# Patient Record
Sex: FEMALE | Race: BLACK OR AFRICAN AMERICAN | NOT HISPANIC OR LATINO | Employment: OTHER | URBAN - METROPOLITAN AREA
[De-identification: names, ages, dates, MRNs, and addresses within clinical notes are randomized per-mention and may not be internally consistent; named-entity substitution may affect disease eponyms.]

---

## 2018-07-07 ENCOUNTER — HOSPITAL ENCOUNTER (INPATIENT)
Facility: HOSPITAL | Age: 58
LOS: 10 days | Discharge: HOME HEALTH | DRG: 871 | End: 2018-07-17
Attending: EMERGENCY MEDICINE | Admitting: INTERNAL MEDICINE
Payer: MEDICARE

## 2018-07-07 DIAGNOSIS — K57.32 DIVERTICULITIS LARGE INTESTINE W/O PERFORATION OR ABSCESS W/O BLEEDING: ICD-10-CM

## 2018-07-07 DIAGNOSIS — Z72.0 TOBACCO ABUSE: ICD-10-CM

## 2018-07-07 DIAGNOSIS — R10.9 ABDOMINAL PAIN: ICD-10-CM

## 2018-07-07 DIAGNOSIS — K57.20 DIVERTICULITIS OF LARGE INTESTINE WITH ABSCESS WITHOUT BLEEDING: Primary | ICD-10-CM

## 2018-07-07 DIAGNOSIS — A41.9 SEPSIS, DUE TO UNSPECIFIED ORGANISM: ICD-10-CM

## 2018-07-07 DIAGNOSIS — B20 HIV (HUMAN IMMUNODEFICIENCY VIRUS INFECTION): ICD-10-CM

## 2018-07-07 DIAGNOSIS — E87.20 LACTIC ACIDOSIS: ICD-10-CM

## 2018-07-07 PROBLEM — K59.01 SLOW TRANSIT CONSTIPATION: Status: ACTIVE | Noted: 2018-07-07

## 2018-07-07 PROBLEM — Z21 HIV (HUMAN IMMUNODEFICIENCY VIRUS INFECTION): Status: ACTIVE | Noted: 2018-07-07

## 2018-07-07 LAB
ALBUMIN SERPL BCP-MCNC: 4.1 G/DL
ALP SERPL-CCNC: 127 U/L
ALT SERPL W/O P-5'-P-CCNC: 16 U/L
ANION GAP SERPL CALC-SCNC: 14 MMOL/L
APTT BLDCRRT: 23.3 SEC
AST SERPL-CCNC: 22 U/L
BACTERIA #/AREA URNS HPF: NORMAL /HPF
BASOPHILS # BLD AUTO: 0 K/UL
BASOPHILS NFR BLD: 0 %
BILIRUB SERPL-MCNC: 1.7 MG/DL
BILIRUB UR QL STRIP: ABNORMAL
BUN SERPL-MCNC: 11 MG/DL
CALCIUM SERPL-MCNC: 10.4 MG/DL
CHLORIDE SERPL-SCNC: 103 MMOL/L
CLARITY UR: CLEAR
CO2 SERPL-SCNC: 19 MMOL/L
COLOR UR: YELLOW
CREAT SERPL-MCNC: 0.9 MG/DL
DIFFERENTIAL METHOD: ABNORMAL
EOSINOPHIL # BLD AUTO: 0 K/UL
EOSINOPHIL NFR BLD: 0 %
ERYTHROCYTE [DISTWIDTH] IN BLOOD BY AUTOMATED COUNT: 12.7 %
EST. GFR  (AFRICAN AMERICAN): >60 ML/MIN/1.73 M^2
EST. GFR  (NON AFRICAN AMERICAN): >60 ML/MIN/1.73 M^2
GLUCOSE SERPL-MCNC: 148 MG/DL
GLUCOSE UR QL STRIP: NEGATIVE
HCT VFR BLD AUTO: 39.6 %
HGB BLD-MCNC: 13.1 G/DL
HGB UR QL STRIP: ABNORMAL
INR PPP: 1.1
KETONES UR QL STRIP: NEGATIVE
LACTATE SERPL-SCNC: 1.7 MMOL/L
LACTATE SERPL-SCNC: 2.5 MMOL/L
LEUKOCYTE ESTERASE UR QL STRIP: NEGATIVE
LIPASE SERPL-CCNC: 7 U/L
LYMPHOCYTES # BLD AUTO: 1 K/UL
LYMPHOCYTES NFR BLD: 34.6 %
MAGNESIUM SERPL-MCNC: 2 MG/DL
MCH RBC QN AUTO: 31.9 PG
MCHC RBC AUTO-ENTMCNC: 33.1 G/DL
MCV RBC AUTO: 96 FL
MICROSCOPIC COMMENT: NORMAL
MONOCYTES # BLD AUTO: 0.2 K/UL
MONOCYTES NFR BLD: 5.8 %
NEUTROPHILS # BLD AUTO: 1.8 K/UL
NEUTROPHILS NFR BLD: 59.6 %
NITRITE UR QL STRIP: NEGATIVE
PH UR STRIP: 6 [PH] (ref 5–8)
PLATELET # BLD AUTO: 193 K/UL
PMV BLD AUTO: 10.2 FL
POCT GLUCOSE: 115 MG/DL (ref 70–110)
POCT GLUCOSE: 120 MG/DL (ref 70–110)
POTASSIUM SERPL-SCNC: 4 MMOL/L
PROCALCITONIN SERPL IA-MCNC: 0.14 NG/ML
PROT SERPL-MCNC: 8.4 G/DL
PROT UR QL STRIP: ABNORMAL
PROTHROMBIN TIME: 11.2 SEC
RBC # BLD AUTO: 4.11 M/UL
RBC #/AREA URNS HPF: 4 /HPF (ref 0–4)
SODIUM SERPL-SCNC: 136 MMOL/L
SP GR UR STRIP: 1.02 (ref 1–1.03)
SQUAMOUS #/AREA URNS HPF: 5 /HPF
TROPONIN I SERPL DL<=0.01 NG/ML-MCNC: 0.01 NG/ML
URN SPEC COLLECT METH UR: ABNORMAL
UROBILINOGEN UR STRIP-ACNC: 1 EU/DL
WBC # BLD AUTO: 2.95 K/UL
WBC #/AREA URNS HPF: 2 /HPF (ref 0–5)

## 2018-07-07 PROCEDURE — 80053 COMPREHEN METABOLIC PANEL: CPT

## 2018-07-07 PROCEDURE — 63600175 PHARM REV CODE 636 W HCPCS: Performed by: EMERGENCY MEDICINE

## 2018-07-07 PROCEDURE — 93005 ELECTROCARDIOGRAM TRACING: CPT

## 2018-07-07 PROCEDURE — 96375 TX/PRO/DX INJ NEW DRUG ADDON: CPT

## 2018-07-07 PROCEDURE — 63600175 PHARM REV CODE 636 W HCPCS: Performed by: NURSE PRACTITIONER

## 2018-07-07 PROCEDURE — 84484 ASSAY OF TROPONIN QUANT: CPT

## 2018-07-07 PROCEDURE — 84145 PROCALCITONIN (PCT): CPT

## 2018-07-07 PROCEDURE — 83605 ASSAY OF LACTIC ACID: CPT

## 2018-07-07 PROCEDURE — 36415 COLL VENOUS BLD VENIPUNCTURE: CPT

## 2018-07-07 PROCEDURE — 96372 THER/PROPH/DIAG INJ SC/IM: CPT | Mod: 59

## 2018-07-07 PROCEDURE — 25000003 PHARM REV CODE 250: Performed by: EMERGENCY MEDICINE

## 2018-07-07 PROCEDURE — 25000003 PHARM REV CODE 250: Performed by: NURSE PRACTITIONER

## 2018-07-07 PROCEDURE — 96361 HYDRATE IV INFUSION ADD-ON: CPT

## 2018-07-07 PROCEDURE — 85610 PROTHROMBIN TIME: CPT

## 2018-07-07 PROCEDURE — 85025 COMPLETE CBC W/AUTO DIFF WBC: CPT

## 2018-07-07 PROCEDURE — S0030 INJECTION, METRONIDAZOLE: HCPCS | Performed by: EMERGENCY MEDICINE

## 2018-07-07 PROCEDURE — 25500020 PHARM REV CODE 255: Performed by: EMERGENCY MEDICINE

## 2018-07-07 PROCEDURE — 96365 THER/PROPH/DIAG IV INF INIT: CPT

## 2018-07-07 PROCEDURE — 99285 EMERGENCY DEPT VISIT HI MDM: CPT | Mod: 25

## 2018-07-07 PROCEDURE — 11000001 HC ACUTE MED/SURG PRIVATE ROOM

## 2018-07-07 PROCEDURE — 83735 ASSAY OF MAGNESIUM: CPT

## 2018-07-07 PROCEDURE — 93010 ELECTROCARDIOGRAM REPORT: CPT | Mod: ,,, | Performed by: INTERNAL MEDICINE

## 2018-07-07 PROCEDURE — 87040 BLOOD CULTURE FOR BACTERIA: CPT | Mod: 59

## 2018-07-07 PROCEDURE — 83605 ASSAY OF LACTIC ACID: CPT | Mod: 91

## 2018-07-07 PROCEDURE — 81000 URINALYSIS NONAUTO W/SCOPE: CPT

## 2018-07-07 PROCEDURE — 83690 ASSAY OF LIPASE: CPT

## 2018-07-07 PROCEDURE — 85730 THROMBOPLASTIN TIME PARTIAL: CPT

## 2018-07-07 RX ORDER — IBUPROFEN 200 MG
24 TABLET ORAL
Status: DISCONTINUED | OUTPATIENT
Start: 2018-07-07 | End: 2018-07-17 | Stop reason: HOSPADM

## 2018-07-07 RX ORDER — FERROUS GLUCONATE 324(38)MG
324 TABLET ORAL DAILY
COMMUNITY
Start: 2018-05-21

## 2018-07-07 RX ORDER — ACETAMINOPHEN 10 MG/ML
1000 INJECTION, SOLUTION INTRAVENOUS EVERY 8 HOURS
Status: COMPLETED | OUTPATIENT
Start: 2018-07-07 | End: 2018-07-08

## 2018-07-07 RX ORDER — CYPROHEPTADINE HYDROCHLORIDE 4 MG/1
4 TABLET ORAL 3 TIMES DAILY
Status: DISCONTINUED | OUTPATIENT
Start: 2018-07-07 | End: 2018-07-17 | Stop reason: HOSPADM

## 2018-07-07 RX ORDER — AMOXICILLIN 250 MG
2 CAPSULE ORAL NIGHTLY
Status: DISCONTINUED | OUTPATIENT
Start: 2018-07-07 | End: 2018-07-07

## 2018-07-07 RX ORDER — ROSUVASTATIN CALCIUM 20 MG/1
20 TABLET, COATED ORAL DAILY
COMMUNITY
Start: 2018-05-21 | End: 2022-04-14

## 2018-07-07 RX ORDER — ENALAPRIL MALEATE 20 MG/1
20 TABLET ORAL
COMMUNITY
Start: 2018-05-21 | End: 2019-05-21

## 2018-07-07 RX ORDER — POLYETHYLENE GLYCOL 3350 17 G/17G
17 POWDER, FOR SOLUTION ORAL DAILY
Status: DISCONTINUED | OUTPATIENT
Start: 2018-07-07 | End: 2018-07-11

## 2018-07-07 RX ORDER — SODIUM CHLORIDE 0.9 % (FLUSH) 0.9 %
5 SYRINGE (ML) INJECTION
Status: DISCONTINUED | OUTPATIENT
Start: 2018-07-07 | End: 2018-07-17 | Stop reason: HOSPADM

## 2018-07-07 RX ORDER — ONDANSETRON 2 MG/ML
4 INJECTION INTRAMUSCULAR; INTRAVENOUS EVERY 8 HOURS PRN
Status: DISCONTINUED | OUTPATIENT
Start: 2018-07-07 | End: 2018-07-17 | Stop reason: HOSPADM

## 2018-07-07 RX ORDER — DICYCLOMINE HYDROCHLORIDE 10 MG/ML
20 INJECTION INTRAMUSCULAR
Status: COMPLETED | OUTPATIENT
Start: 2018-07-07 | End: 2018-07-07

## 2018-07-07 RX ORDER — ONDANSETRON 2 MG/ML
4 INJECTION INTRAMUSCULAR; INTRAVENOUS
Status: COMPLETED | OUTPATIENT
Start: 2018-07-07 | End: 2018-07-07

## 2018-07-07 RX ORDER — ROSUVASTATIN CALCIUM 10 MG/1
20 TABLET, COATED ORAL DAILY
Status: DISCONTINUED | OUTPATIENT
Start: 2018-07-07 | End: 2018-07-17 | Stop reason: HOSPADM

## 2018-07-07 RX ORDER — CYPROHEPTADINE HYDROCHLORIDE 4 MG/1
4 TABLET ORAL 3 TIMES DAILY
COMMUNITY
Start: 2018-05-21

## 2018-07-07 RX ORDER — ACETAMINOPHEN 325 MG/1
650 TABLET ORAL EVERY 4 HOURS PRN
Status: DISCONTINUED | OUTPATIENT
Start: 2018-07-07 | End: 2018-07-12

## 2018-07-07 RX ORDER — GLUCAGON 1 MG
1 KIT INJECTION
Status: DISCONTINUED | OUTPATIENT
Start: 2018-07-07 | End: 2018-07-17 | Stop reason: HOSPADM

## 2018-07-07 RX ORDER — AMOXICILLIN 250 MG
2 CAPSULE ORAL NIGHTLY
Status: DISCONTINUED | OUTPATIENT
Start: 2018-07-07 | End: 2018-07-17 | Stop reason: HOSPADM

## 2018-07-07 RX ORDER — IBUPROFEN 400 MG/1
400 TABLET ORAL EVERY 4 HOURS PRN
Status: DISCONTINUED | OUTPATIENT
Start: 2018-07-07 | End: 2018-07-17 | Stop reason: HOSPADM

## 2018-07-07 RX ORDER — FERROUS GLUCONATE 324(38)MG
324 TABLET ORAL DAILY
Status: DISCONTINUED | OUTPATIENT
Start: 2018-07-07 | End: 2018-07-17 | Stop reason: HOSPADM

## 2018-07-07 RX ORDER — BUPROPION HYDROCHLORIDE 150 MG/1
150 TABLET, EXTENDED RELEASE ORAL 2 TIMES DAILY
Status: DISCONTINUED | OUTPATIENT
Start: 2018-07-07 | End: 2018-07-17 | Stop reason: HOSPADM

## 2018-07-07 RX ORDER — MORPHINE SULFATE 2 MG/ML
2 INJECTION, SOLUTION INTRAMUSCULAR; INTRAVENOUS ONCE
Status: COMPLETED | OUTPATIENT
Start: 2018-07-07 | End: 2018-07-07

## 2018-07-07 RX ORDER — METRONIDAZOLE 500 MG/100ML
500 INJECTION, SOLUTION INTRAVENOUS
Status: DISCONTINUED | OUTPATIENT
Start: 2018-07-07 | End: 2018-07-11

## 2018-07-07 RX ORDER — CIPROFLOXACIN 2 MG/ML
400 INJECTION, SOLUTION INTRAVENOUS
Status: DISCONTINUED | OUTPATIENT
Start: 2018-07-07 | End: 2018-07-11

## 2018-07-07 RX ORDER — ACETAMINOPHEN 325 MG/1
650 TABLET ORAL EVERY 6 HOURS PRN
Status: DISCONTINUED | OUTPATIENT
Start: 2018-07-07 | End: 2018-07-09 | Stop reason: SDUPTHER

## 2018-07-07 RX ORDER — INSULIN ASPART 100 [IU]/ML
0-5 INJECTION, SOLUTION INTRAVENOUS; SUBCUTANEOUS
Status: DISCONTINUED | OUTPATIENT
Start: 2018-07-07 | End: 2018-07-17 | Stop reason: HOSPADM

## 2018-07-07 RX ORDER — MULTIVITAMIN
TABLET ORAL
COMMUNITY
Start: 2015-03-03

## 2018-07-07 RX ORDER — SODIUM CHLORIDE 9 MG/ML
INJECTION, SOLUTION INTRAVENOUS CONTINUOUS
Status: DISCONTINUED | OUTPATIENT
Start: 2018-07-07 | End: 2018-07-17 | Stop reason: HOSPADM

## 2018-07-07 RX ORDER — IBUPROFEN 200 MG
16 TABLET ORAL
Status: DISCONTINUED | OUTPATIENT
Start: 2018-07-07 | End: 2018-07-17 | Stop reason: HOSPADM

## 2018-07-07 RX ORDER — BUPROPION HYDROCHLORIDE 150 MG/1
150 TABLET, EXTENDED RELEASE ORAL 2 TIMES DAILY
COMMUNITY
Start: 2018-05-21 | End: 2019-05-21

## 2018-07-07 RX ADMIN — Medication 2 MG: at 02:07

## 2018-07-07 RX ADMIN — SODIUM CHLORIDE: 0.9 INJECTION, SOLUTION INTRAVENOUS at 01:07

## 2018-07-07 RX ADMIN — DICYCLOMINE HYDROCHLORIDE 20 MG: 10 INJECTION INTRAMUSCULAR at 10:07

## 2018-07-07 RX ADMIN — ROSUVASTATIN CALCIUM 20 MG: 10 TABLET, FILM COATED ORAL at 03:07

## 2018-07-07 RX ADMIN — STANDARDIZED SENNA CONCENTRATE AND DOCUSATE SODIUM 2 TABLET: 8.6; 5 TABLET, FILM COATED ORAL at 04:07

## 2018-07-07 RX ADMIN — IBUPROFEN 400 MG: 400 TABLET, FILM COATED ORAL at 10:07

## 2018-07-07 RX ADMIN — ACETAMINOPHEN 650 MG: 325 TABLET, FILM COATED ORAL at 01:07

## 2018-07-07 RX ADMIN — ONDANSETRON 4 MG: 2 INJECTION, SOLUTION INTRAMUSCULAR; INTRAVENOUS at 10:07

## 2018-07-07 RX ADMIN — SODIUM CHLORIDE 1974 ML: 0.9 INJECTION, SOLUTION INTRAVENOUS at 09:07

## 2018-07-07 RX ADMIN — METRONIDAZOLE 500 MG: 500 INJECTION, SOLUTION INTRAVENOUS at 12:07

## 2018-07-07 RX ADMIN — METRONIDAZOLE 500 MG: 500 INJECTION, SOLUTION INTRAVENOUS at 08:07

## 2018-07-07 RX ADMIN — ACETAMINOPHEN 1000 MG: 10 INJECTION, SOLUTION INTRAVENOUS at 07:07

## 2018-07-07 RX ADMIN — IOHEXOL 75 ML: 350 INJECTION, SOLUTION INTRAVENOUS at 10:07

## 2018-07-07 RX ADMIN — FERROUS GLUCONATE TAB 324 MG (37.5 MG ELEMENTAL IRON) 324 MG: 324 (37.5 FE) TAB at 03:07

## 2018-07-07 RX ADMIN — CYPROHEPTADINE HYDROCHLORIDE 4 MG: 4 TABLET ORAL at 08:07

## 2018-07-07 RX ADMIN — BUPROPION HYDROCHLORIDE 150 MG: 150 TABLET, FILM COATED, EXTENDED RELEASE ORAL at 08:07

## 2018-07-07 RX ADMIN — CIPROFLOXACIN 400 MG: 2 INJECTION, SOLUTION INTRAVENOUS at 01:07

## 2018-07-07 RX ADMIN — CIPROFLOXACIN 400 MG: 2 INJECTION, SOLUTION INTRAVENOUS at 11:07

## 2018-07-07 RX ADMIN — POLYETHYLENE GLYCOL (3350) 17 G: 17 POWDER, FOR SOLUTION ORAL at 03:07

## 2018-07-07 NOTE — SUBJECTIVE & OBJECTIVE
Past Medical History:   Diagnosis Date    Anemia     Cholelithiasis     CVA (cerebral vascular accident)     Diverticulitis     Glaucoma     HIV disease     HLD (hyperlipidemia)     HTN (hypertension)        Past Surgical History:   Procedure Laterality Date    NONE         Review of patient's allergies indicates:   Allergen Reactions    Bactrim [sulfamethoxazole-trimethoprim] Rash       No current facility-administered medications on file prior to encounter.      Current Outpatient Prescriptions on File Prior to Encounter   Medication Sig    emtricitabine-tenofovir 200-300 mg (TRUVADA) 200-300 mg Tab Take 1 tablet by mouth once daily.    [DISCONTINUED] atazanavir (REYATAZ) 300 MG Cap Take 400 mg by mouth daily with breakfast.    [DISCONTINUED] ritonavir (NORVIR) 100 mg Cap Take 100 mg by mouth once daily.     Family History     Problem Relation (Age of Onset)    Stroke Mother        Social History Main Topics    Smoking status: Current Every Day Smoker     Packs/day: 1.00     Years: 46.00     Types: Cigarettes    Smokeless tobacco: Never Used    Alcohol use No    Drug use: No    Sexual activity: Yes     Partners: Male     Review of Systems   Constitutional: Positive for fatigue. Negative for appetite change, chills and fever.   HENT: Negative.  Negative for congestion, ear discharge, facial swelling, sore throat and trouble swallowing.    Eyes: Negative.  Negative for photophobia, pain, discharge, redness and visual disturbance.   Respiratory: Negative.  Negative for cough, chest tightness, shortness of breath, wheezing and stridor.    Cardiovascular: Negative.  Negative for chest pain, palpitations and leg swelling.   Gastrointestinal: Positive for abdominal pain, nausea and vomiting. Negative for abdominal distention, anal bleeding, blood in stool, constipation, diarrhea and rectal pain.   Endocrine: Negative.  Negative for cold intolerance, heat intolerance, polydipsia, polyphagia and polyuria.    Genitourinary: Negative.  Negative for difficulty urinating, dysuria, flank pain, frequency, hematuria, pelvic pain, urgency, vaginal bleeding and vaginal discharge.   Musculoskeletal: Negative.  Negative for arthralgias, back pain, gait problem, joint swelling, myalgias and neck pain.   Skin: Negative for color change, pallor, rash and wound.   Allergic/Immunologic: Negative.  Negative for food allergies and immunocompromised state.   Neurological: Positive for weakness. Negative for dizziness, tremors, seizures, syncope, facial asymmetry, speech difficulty, light-headedness, numbness and headaches.   Hematological: Negative.  Negative for adenopathy. Does not bruise/bleed easily.   Psychiatric/Behavioral: Negative.  Negative for agitation, confusion, hallucinations, sleep disturbance and suicidal ideas. The patient is not nervous/anxious.    All other systems reviewed and are negative.    Objective:     Vital Signs (Most Recent):  Temp: (!) 103 °F (39.4 °C) (07/07/18 1409)  Pulse: 98 (07/07/18 1409)  Resp: 16 (07/07/18 1409)  BP: (!) 113/56 (07/07/18 1409)  SpO2: 96 % (07/07/18 1409) Vital Signs (24h Range):  Temp:  [99.6 °F (37.6 °C)-103.7 °F (39.8 °C)] 103 °F (39.4 °C)  Pulse:  [] 98  Resp:  [16-32] 16  SpO2:  [96 %-100 %] 96 %  BP: ()/(56-64) 113/56     Weight: 59.5 kg (131 lb 2.8 oz)  Body mass index is 21.83 kg/m².    Physical Exam   Constitutional: She is oriented to person, place, and time. She appears well-developed and well-nourished. No distress.   HENT:   Head: Normocephalic and atraumatic.   Nose: Nose normal.   Eyes: Conjunctivae and EOM are normal. Pupils are equal, round, and reactive to light. Right eye exhibits no discharge. Left eye exhibits no discharge.   Neck: Normal range of motion. Neck supple. No JVD present. No tracheal deviation present. No thyromegaly present.   Cardiovascular: Normal rate, regular rhythm and normal heart sounds.  Exam reveals no gallop and no friction rub.     No murmur heard.  Pulmonary/Chest: Effort normal and breath sounds normal. No stridor. No respiratory distress. She has no wheezes. She has no rales. She exhibits no tenderness.   Abdominal: Soft. Bowel sounds are normal. She exhibits distension. She exhibits no mass. There is tenderness. There is guarding.   Musculoskeletal: She exhibits deformity (mouth). She exhibits no edema or tenderness.   Right sided weakness   Lymphadenopathy:     She has no cervical adenopathy.   Neurological: She is alert and oriented to person, place, and time. She has normal reflexes. No cranial nerve deficit. Coordination normal.   Skin: Skin is warm and dry. No rash noted. She is not diaphoretic. No erythema. No pallor.   Psychiatric: She has a normal mood and affect. Her behavior is normal. Judgment and thought content normal.   Nursing note and vitals reviewed.        CRANIAL NERVES     CN III, IV, VI   Pupils are equal, round, and reactive to light.  Extraocular motions are normal.      Significant Labs:   CBC:   Recent Labs  Lab 07/07/18  0945   WBC 2.95*   HGB 13.1   HCT 39.6        CMP:   Recent Labs  Lab 07/07/18  0945      K 4.0      CO2 19*   *   BUN 11   CREATININE 0.9   CALCIUM 10.4   PROT 8.4   ALBUMIN 4.1   BILITOT 1.7*   ALKPHOS 127   AST 22   ALT 16   ANIONGAP 14   EGFRNONAA >60   Lactic Acid 2.5    Significant Imaging: I have reviewed all pertinent imaging results/findings within the past 24 hours.   Imaging Results          CT Abdomen Pelvis With Contrast (Final result)  Result time 07/07/18 11:30:11    Final result by Arpan Del Castillo MD (07/07/18 11:30:11)                 Impression:      1.  Significant thickening of the mid sigmoid colon associated with diverticula, with surrounding edema.  Associated ascites in the cul-de-sac and around the liver.  Findings are concerning for uncomplicated diverticulitis of the sigmoid colon.  Negative for free air or focal drainable fluid  collection.    2. Negative for acute process involving the abdomen or pelvis otherwise.  Normal appendix.  Negative for renal stone disease or hydronephrosis.    3.  Moderate to severe multifactorial spinal canal stenosis at L4/L5 and L5/S1.  Clinical correlation is advised.    4.  Avascular necrosis changes to the left greater than right hip joints.  Clinical correlation is advised.    5.  Cholelithiasis.  Small fat filled umbilical hernia.  Other nonemergent findings as described above.    All CT scans at this facility used dose modulation, iterative reconstruction, and/or weight based dosing when appropriate to reduce radiation dose to as low as reasonably achievable.      Electronically signed by: Arpan Del Castillo MD  Date:    07/07/2018  Time:    11:30             Narrative:    EXAMINATION:  CT ABDOMEN PELVIS WITH CONTRAST    CLINICAL HISTORY:  Lower abdominal pain;    TECHNIQUE:  Axial images through the abdomen and pelvis were obtained with the use of IV contrast.  Sagittal and coronal reconstructions are provided for review.  Oral contrast was not utilized.    COMPARISON:  None    FINDINGS:  No comparison studies are available.    LUNG BASES:   Lung bases are clear.  Negative for pleural or pericardial effusions. The distal esophagus is normal.    ABDOMEN: Gallstones.  The liver, spleen and gallbladder otherwise appear normal.  The pancreas is unremarkable.  Kidneys and adrenal glands are normal.    Negative for adenopathy noted within the abdomen or pelvis.  Vascular calcifications are present without aneurysmal changes. Portal vein is patent.    The stomach and small bowel appear normal.  There is significant edema within sigmoid colon with associated diverticula.  With moderate surrounding edema.  Mild to moderate ascites in the cul-de-sac and mild ascites around the liver.  The rest of the colon appears normal.  A normal appendix.    PELVIS: The urinary bladder is unremarkable.    The female pelvic organs  are normal. There are pelvic phleboliths.    No significant osseous abnormality is identified.  Broad-based disc bulge at L4/L5 and L5/S1 with moderate to severe multifactorial spinal canal stenosis.  There are significant degenerative changes a changes of the femoral heads.  Osteopenia.    Negative for groin adenopathy.    Tiny fat filled umbilical hernia.  The abdominal wall is otherwise intact.                               X-Ray Chest AP Portable (Final result)  Result time 07/07/18 10:18:44    Final result by Arpan Del Castillo MD (07/07/18 10:18:44)                 Impression:      1.  Negative for acute process involving the chest, considering there are lower lung volumes on today's study.    2.  Stable findings as noted above.      Electronically signed by: Arpan Del Castillo MD  Date:    07/07/2018  Time:    10:18             Narrative:    EXAMINATION:  XR CHEST AP PORTABLE    CLINICAL HISTORY:  Sepsis;    COMPARISON:  2 June 2014    FINDINGS:  EKG leads overlie the chest.  Mildly low lung volumes.  The lungs are otherwise clear.  The cardiac silhouette size is normal. The trachea is midline and the mediastinal width is normal. Negative for focal infiltrate, effusion or pneumothorax. Pulmonary vasculature is normal. Negative for osseous abnormalities. Tortuous aorta.  Convex left curvature of the lower thoracic spine.  Stable calcifications within the central portions of both humeri, possibly bone infarctions.

## 2018-07-07 NOTE — ED NOTES
"Patient c/o RLQ abdominal and pelvic pain that began this morning. Patient reports feeling warm, but didn't take temperature. Patient also states "If I could use the bathroom (void) I think I would feel better."    Patient moved to ED room 16, patient assisted onto stretcher and changed into a gown. Patient placed on cardiac monitor, continuous pulse oximetry and automatic blood pressure cuff. Bed placed in low locked position, side rails up x 2, call light is within reach of patient or family, orientation to room and explanation of wait provided to family and patient, alarms set and turned on for monitor and pulse ox, awaiting MD evaluation and orders, will continue to monitor.    Patient identifies self as Lizz Noguera.      LOC: The patient is awake, alert and aware of environment with an appropriate affect, the patient is oriented x 3 and speaking appropriately.  APPEARANCE: Patient resting comfortably and in no acute distress, patient is clean and well groomed, patient's clothing is properly fastened.  SKIN: The skin is warm and dry, color consistent with ethnicity, patient has normal skin turgor and moist mucus membranes, skin intact, no breakdown or bruising noted.  MUSCULOSKELETAL: Patient moving all extremities well, no obvious swelling or deformities noted.  RESPIRATORY: Airway is open and patent, respirations are spontaneous, patient has a normal effort and rate, no accessory muscle use noted.  CARDIAC: Patient has a normal rate and rhythm, no periphreal edema noted, capillary refill < 3 seconds.  ABDOMEN: Soft and tender to palpation to RUQ, no distention noted.  NEUROLOGIC: PERRL, 3 mm bilaterally, eyes open spontaneously, behavior appropriate to situation, follows commands, facial expression symmetrical, bilateral hand grasp equal and even, purposeful motor response noted, normal sensation in all extremities when touched with a finger.         "

## 2018-07-07 NOTE — PLAN OF CARE
Problem: Patient Care Overview  Goal: Plan of Care Review  Outcome: Ongoing (interventions implemented as appropriate)  Patient remains free of falls and safety precautions maintained. Tempeture monitored, medications given. Pain controlled with medications. Patient tolerating clear liquid diet. Will continue to monitor. 12 hour chart check.

## 2018-07-07 NOTE — ED PROVIDER NOTES
"SCRIBE #1 NOTE: I, Shashi Kelly, am scribing for, and in the presence of, Kate Cesar MD. I have scribed the entire note.      History      Chief Complaint   Patient presents with    Abdominal Pain     Pt states, "I am having extreme pain in my stomach."       Review of patient's allergies indicates:   Allergen Reactions    Bactrim [sulfamethoxazole-trimethoprim] Rash        HPI   HPI    7/7/2018, 9:32 AM   History obtained from the patient      History of Present Illness: Lizz Noguera is a 57 y.o. female patient w/ a PMHx of HIV who presents to the Emergency Department for suprapubic abd pain which onset suddenly this AM. Symptoms are constant and moderate in severity. No mitigating or exacerbating factors reported. No associated sxs. Patient denies any n/v/d, constipation, hematochezia, dysuria, hematuria, difficulty urinating, frequency, fever, chills, and all other sxs at this time. Pt denies any hist of abdominal surgeries. She still has her appendix. No further complaints or concerns at this time.         Arrival mode: Personal vehicle    PCP: Joseluis Doe MD (Inactive)       Past Medical History:  Past Medical History:   Diagnosis Date    HIV disease        Past Surgical History:  History reviewed. Nothing pertinent.        Family History:  History reviewed. Nothing pertinent.     Social History:  Social History     Social History Main Topics    Smoking status: Current Every Day Smoker     Packs/day: 1.00     Types: Cigarettes    Smokeless tobacco: Never Used    Alcohol use No    Drug use: No    Sexual activity: Yes     Partners: Male       ROS   Review of Systems   Constitutional: Negative for chills and fever.   HENT: Negative for sore throat.    Respiratory: Negative for shortness of breath.    Cardiovascular: Negative for chest pain.   Gastrointestinal: Positive for abdominal pain (suprapubic). Negative for blood in stool, constipation, diarrhea, nausea and vomiting. " "  Genitourinary: Negative for difficulty urinating, dysuria, frequency and hematuria.   Musculoskeletal: Negative for back pain.   Skin: Negative for rash.   Neurological: Negative for weakness.   Hematological: Does not bruise/bleed easily.   All other systems reviewed and are negative.      Physical Exam      Initial Vitals [07/07/18 0922]   BP Pulse Resp Temp SpO2   (!) 95/58 105 20 99.6 °F (37.6 °C) 96 %      MAP       --          Physical Exam  Nursing Notes and Vital Signs Reviewed.  Constitutional: Patient is in mild distress. Well-developed and well-nourished.  Head: Atraumatic. Normocephalic.  Eyes: PERRL. EOM intact. Conjunctivae are not pale. No scleral icterus.  ENT: Mucous membranes are moist. Oropharynx is clear and symmetric.    Neck: Supple. Full ROM. No lymphadenopathy.  Cardiovascular: Regular rate. Regular rhythm. No murmurs, rubs, or gallops. Distal pulses are 2+ and symmetric.  Pulmonary/Chest: No respiratory distress. Clear to auscultation bilaterally. No wheezing or rales.  Abdominal: Soft and non-distended.  RLQ suprapubic tenderness.  No rebound, guarding, or rigidity.   Musculoskeletal: Moves all extremities. No obvious deformities. No edema. No calf tenderness.  Skin: Warm and dry.  Neurological:  Alert, awake, and appropriate.  Normal speech.  No acute focal neurological deficits are appreciated.  Psychiatric: Normal affect. Good eye contact. Appropriate in content.    ED Course    Procedures  ED Vital Signs:  Vitals:    07/07/18 0922 07/07/18 0955 07/07/18 1031 07/07/18 1037   BP: (!) 95/58  (!) 121/57    Pulse: 105 104 101    Resp: 20   (!) 25   Temp: 99.6 °F (37.6 °C)      TempSrc: Oral      SpO2: 96%  100%    Weight: 65.8 kg (145 lb)      Height: 5' 5" (1.651 m)       07/07/18 1100 07/07/18 1202   BP: 135/64 (!) 127/56   Pulse: 110 99   Resp: (!) 25 (!) 31   Temp:     TempSrc:     SpO2: 100% 100%   Weight:     Height:         Abnormal Lab Results:  Labs Reviewed   CBC W/ AUTO " DIFFERENTIAL - Abnormal; Notable for the following:        Result Value    WBC 2.95 (*)     MCH 31.9 (*)     Mono # 0.2 (*)     All other components within normal limits   COMPREHENSIVE METABOLIC PANEL - Abnormal; Notable for the following:     CO2 19 (*)     Glucose 148 (*)     Total Bilirubin 1.7 (*)     All other components within normal limits   LACTIC ACID, PLASMA - Abnormal; Notable for the following:     Lactate (Lactic Acid) 2.5 (*)     All other components within normal limits   URINALYSIS, REFLEX TO URINE CULTURE - Abnormal; Notable for the following:     Protein, UA Trace (*)     Bilirubin (UA) 1+ (*)     Occult Blood UA 1+ (*)     All other components within normal limits    Narrative:     Preferred Collection Type->Urine, Catheterized   CULTURE, BLOOD   CULTURE, BLOOD   MAGNESIUM   PROTIME-INR   APTT   PROCALCITONIN   TROPONIN I   LIPASE   URINALYSIS MICROSCOPIC    Narrative:     Preferred Collection Type->Urine, Catheterized   LACTIC ACID, PLASMA        All Lab Results:  Results for orders placed or performed during the hospital encounter of 07/07/18   CBC auto differential   Result Value Ref Range    WBC 2.95 (L) 3.90 - 12.70 K/uL    RBC 4.11 4.00 - 5.40 M/uL    Hemoglobin 13.1 12.0 - 16.0 g/dL    Hematocrit 39.6 37.0 - 48.5 %    MCV 96 82 - 98 fL    MCH 31.9 (H) 27.0 - 31.0 pg    MCHC 33.1 32.0 - 36.0 g/dL    RDW 12.7 11.5 - 14.5 %    Platelets 193 150 - 350 K/uL    MPV 10.2 9.2 - 12.9 fL    Gran # (ANC) 1.8 1.8 - 7.7 K/uL    Lymph # 1.0 1.0 - 4.8 K/uL    Mono # 0.2 (L) 0.3 - 1.0 K/uL    Eos # 0.0 0.0 - 0.5 K/uL    Baso # 0.00 0.00 - 0.20 K/uL    Gran% 59.6 38.0 - 73.0 %    Lymph% 34.6 18.0 - 48.0 %    Mono% 5.8 4.0 - 15.0 %    Eosinophil% 0.0 0.0 - 8.0 %    Basophil% 0.0 0.0 - 1.9 %    Differential Method Automated    Comprehensive metabolic panel   Result Value Ref Range    Sodium 136 136 - 145 mmol/L    Potassium 4.0 3.5 - 5.1 mmol/L    Chloride 103 95 - 110 mmol/L    CO2 19 (L) 23 - 29 mmol/L     Glucose 148 (H) 70 - 110 mg/dL    BUN, Bld 11 6 - 20 mg/dL    Creatinine 0.9 0.5 - 1.4 mg/dL    Calcium 10.4 8.7 - 10.5 mg/dL    Total Protein 8.4 6.0 - 8.4 g/dL    Albumin 4.1 3.5 - 5.2 g/dL    Total Bilirubin 1.7 (H) 0.1 - 1.0 mg/dL    Alkaline Phosphatase 127 55 - 135 U/L    AST 22 10 - 40 U/L    ALT 16 10 - 44 U/L    Anion Gap 14 8 - 16 mmol/L    eGFR if African American >60 >60 mL/min/1.73 m^2    eGFR if non African American >60 >60 mL/min/1.73 m^2   Lactic acid, plasma #1   Result Value Ref Range    Lactate (Lactic Acid) 2.5 (H) 0.5 - 2.2 mmol/L   Urinalysis, Reflex to Urine Culture Urine, Catheterized   Result Value Ref Range    Specimen UA Urine, Catheterized     Color, UA Yellow Yellow, Straw, Kellen    Appearance, UA Clear Clear    pH, UA 6.0 5.0 - 8.0    Specific Gravity, UA 1.025 1.005 - 1.030    Protein, UA Trace (A) Negative    Glucose, UA Negative Negative    Ketones, UA Negative Negative    Bilirubin (UA) 1+ (A) Negative    Occult Blood UA 1+ (A) Negative    Nitrite, UA Negative Negative    Urobilinogen, UA 1.0 <2.0 EU/dL    Leukocytes, UA Negative Negative   Magnesium   Result Value Ref Range    Magnesium 2.0 1.6 - 2.6 mg/dL   Protime-INR   Result Value Ref Range    Prothrombin Time 11.2 9.0 - 12.5 sec    INR 1.1 0.8 - 1.2   APTT   Result Value Ref Range    aPTT 23.3 21.0 - 32.0 sec   Procalcitonin   Result Value Ref Range    Procalcitonin 0.14 <0.25 ng/mL   Troponin I   Result Value Ref Range    Troponin I 0.007 0.000 - 0.026 ng/mL   Lipase   Result Value Ref Range    Lipase 7 4 - 60 U/L   Urinalysis Microscopic   Result Value Ref Range    RBC, UA 4 0 - 4 /hpf    WBC, UA 2 0 - 5 /hpf    Bacteria, UA Occasional None-Occ /hpf    Squam Epithel, UA 5 /hpf    Microscopic Comment SEE COMMENT          Imaging Results:  Imaging Results          CT Abdomen Pelvis With Contrast (Final result)  Result time 07/07/18 11:30:11    Final result by Arpan Del Castillo MD (07/07/18 11:30:11)                 Impression:       1.  Significant thickening of the mid sigmoid colon associated with diverticula, with surrounding edema.  Associated ascites in the cul-de-sac and around the liver.  Findings are concerning for uncomplicated diverticulitis of the sigmoid colon.  Negative for free air or focal drainable fluid collection.    2. Negative for acute process involving the abdomen or pelvis otherwise.  Normal appendix.  Negative for renal stone disease or hydronephrosis.    3.  Moderate to severe multifactorial spinal canal stenosis at L4/L5 and L5/S1.  Clinical correlation is advised.    4.  Avascular necrosis changes to the left greater than right hip joints.  Clinical correlation is advised.    5.  Cholelithiasis.  Small fat filled umbilical hernia.  Other nonemergent findings as described above.    All CT scans at this facility used dose modulation, iterative reconstruction, and/or weight based dosing when appropriate to reduce radiation dose to as low as reasonably achievable.      Electronically signed by: Arpan Del Castillo MD  Date:    07/07/2018  Time:    11:30             Narrative:    EXAMINATION:  CT ABDOMEN PELVIS WITH CONTRAST    CLINICAL HISTORY:  Lower abdominal pain;    TECHNIQUE:  Axial images through the abdomen and pelvis were obtained with the use of IV contrast.  Sagittal and coronal reconstructions are provided for review.  Oral contrast was not utilized.    COMPARISON:  None    FINDINGS:  No comparison studies are available.    LUNG BASES:   Lung bases are clear.  Negative for pleural or pericardial effusions. The distal esophagus is normal.    ABDOMEN: Gallstones.  The liver, spleen and gallbladder otherwise appear normal.  The pancreas is unremarkable.  Kidneys and adrenal glands are normal.    Negative for adenopathy noted within the abdomen or pelvis.  Vascular calcifications are present without aneurysmal changes. Portal vein is patent.    The stomach and small bowel appear normal.  There is significant edema  within sigmoid colon with associated diverticula.  With moderate surrounding edema.  Mild to moderate ascites in the cul-de-sac and mild ascites around the liver.  The rest of the colon appears normal.  A normal appendix.    PELVIS: The urinary bladder is unremarkable.    The female pelvic organs are normal. There are pelvic phleboliths.    No significant osseous abnormality is identified.  Broad-based disc bulge at L4/L5 and L5/S1 with moderate to severe multifactorial spinal canal stenosis.  There are significant degenerative changes a changes of the femoral heads.  Osteopenia.    Negative for groin adenopathy.    Tiny fat filled umbilical hernia.  The abdominal wall is otherwise intact.                               X-Ray Chest AP Portable (Final result)  Result time 07/07/18 10:18:44    Final result by Arpan Del Castillo MD (07/07/18 10:18:44)                 Impression:      1.  Negative for acute process involving the chest, considering there are lower lung volumes on today's study.    2.  Stable findings as noted above.      Electronically signed by: Arpan Del Castillo MD  Date:    07/07/2018  Time:    10:18             Narrative:    EXAMINATION:  XR CHEST AP PORTABLE    CLINICAL HISTORY:  Sepsis;    COMPARISON:  2 June 2014    FINDINGS:  EKG leads overlie the chest.  Mildly low lung volumes.  The lungs are otherwise clear.  The cardiac silhouette size is normal. The trachea is midline and the mediastinal width is normal. Negative for focal infiltrate, effusion or pneumothorax. Pulmonary vasculature is normal. Negative for osseous abnormalities. Tortuous aorta.  Convex left curvature of the lower thoracic spine.  Stable calcifications within the central portions of both humeri, possibly bone infarctions.                                 The EKG was ordered, reviewed, and independently interpreted by the ED provider.  Interpretation time: 09:42  Rate: 104 BPM  Rhythm: sinus tachycardia  Interpretation: Possible LAE.  Possible inferior infarct, age undetermined. No STEMI.               The Emergency Provider reviewed the vital signs and test results, which are outlined above.    ED Discussion     12:20 PM: Discussed case with Светлана Cruz NP, ([facility]). Dr. Barnes agrees with current care and management of pt and accepts admission.   Admitting Service: Hospital medicine   Admitting Physician: Dr. Barnes  Admit to: Med/surg    12:24 PM: Re-evaluated pt. I have discussed test results, shared treatment plan, and the need for admission with patient and family at bedside. Pt and family express understanding at this time and agree with all information. All questions answered. Pt and family have no further questions or concerns at this time. Pt is ready for admit.          ED Medication(s):  Medications   metronidazole IVPB 500 mg (500 mg Intravenous New Bag 7/7/18 1200)   ciprofloxacin (CIPRO)400mg/200ml D5W IVPB 400 mg (not administered)   sodium chloride 0.9% bolus 1,974 mL (1,974 mLs Intravenous New Bag 7/7/18 0954)   ondansetron injection 4 mg (4 mg Intravenous Given 7/7/18 1026)   dicyclomine injection 20 mg (20 mg Intramuscular Given 7/7/18 1026)   omnipaque 350 iohexol 75 mL (75 mLs Intravenous Given 7/7/18 1058)       New Prescriptions    No medications on file             Medical Decision Making    Medical Decision Making:   Clinical Tests:   Lab Tests: Ordered and Reviewed  Radiological Study: Ordered and Reviewed  Medical Tests: Ordered and Reviewed           Scribe Attestation:   Scribe #1: I performed the above scribed service and the documentation accurately describes the services I performed. I attest to the accuracy of the note.    Attending:   Physician Attestation Statement for Scribe #1: I, Kate Cesar MD, personally performed the services described in this documentation, as scribed by Shashi Kelly, in my presence, and it is both accurate and complete.          Clinical Impression        ICD-10-CM ICD-9-CM   1. Diverticulitis large intestine w/o perforation or abscess w/o bleeding K57.32 562.11   2. Abdominal pain R10.9 789.00   3. HIV (human immunodeficiency virus infection) B20 V08   4. Sepsis, due to unspecified organism A41.9 038.9     995.91   5. Lactic acidosis E87.2 276.2       Disposition:   Disposition: Admitted  Condition: Erna Cesar MD  07/07/18 1235

## 2018-07-07 NOTE — H&P
"[facility] Hospital Medicine  History & Physical    Patient Name: Lizz Noguera  MRN: [id number]  Admission Date: 7/7/2018  Attending Physician: David Barnes MD   Primary Care Provider: Joseluis Doe MD (Inactive)    Patient information was obtained from patient, spouse/SO, past medical records and ER records.     Subjective:     Principal Problem:Diverticulitis large intestine w/o perforation or abscess w/o bleeding    Chief Complaint:   Chief Complaint   Patient presents with    Abdominal Pain     Pt states, "I am having extreme pain in my stomach."        HPI: Lizz Nougera is a 57 y.o. female patient with a PMHx of HIV, CVA with right sided residual weakness, HLP, depression, who presented to the ER for suprapubic abd pain which onset suddenly this AM. Associated sxs included constipation. Patient denies any n/v/d, hematochezia, dysuria, hematuria, difficulty urinating, frequency, fever, chills, and all other sxs at this time. Pt denies any history of abdominal surgeries. She still has her appendix. CT scan showed uncomplicated diverticulitis of the sigmoid without abscess. Blood cultures pending. In ER, Temp 103.7, glucose 148, lactic acid 2.5. She is admitted with diverticulitis on IV flagyl and IV cipro.     Past Medical History:   Diagnosis Date    Anemia     Cholelithiasis     CVA (cerebral vascular accident)     Diverticulitis     Glaucoma     HIV disease     HLD (hyperlipidemia)     HTN (hypertension)        Past Surgical History:   Procedure Laterality Date    NONE         Review of patient's allergies indicates:   Allergen Reactions    Bactrim [sulfamethoxazole-trimethoprim] Rash       No current facility-administered medications on file prior to encounter.      Current Outpatient Prescriptions on File Prior to Encounter   Medication Sig    emtricitabine-tenofovir 200-300 mg (TRUVADA) 200-300 mg Tab Take 1 tablet by mouth once daily.    [DISCONTINUED] atazanavir " (REYATAZ) 300 MG Cap Take 400 mg by mouth daily with breakfast.    [DISCONTINUED] ritonavir (NORVIR) 100 mg Cap Take 100 mg by mouth once daily.     Family History     Problem Relation (Age of Onset)    Stroke Mother        Social History Main Topics    Smoking status: Current Every Day Smoker     Packs/day: 1.00     Years: 46.00     Types: Cigarettes    Smokeless tobacco: Never Used    Alcohol use No    Drug use: No    Sexual activity: Yes     Partners: Male     Review of Systems   Constitutional: Positive for fatigue. Negative for appetite change, chills and fever.   HENT: Negative.  Negative for congestion, ear discharge, facial swelling, sore throat and trouble swallowing.    Eyes: Negative.  Negative for photophobia, pain, discharge, redness and visual disturbance.   Respiratory: Negative.  Negative for cough, chest tightness, shortness of breath, wheezing and stridor.    Cardiovascular: Negative.  Negative for chest pain, palpitations and leg swelling.   Gastrointestinal: Positive for abdominal pain, nausea and vomiting. Negative for abdominal distention, anal bleeding, blood in stool, constipation, diarrhea and rectal pain.   Endocrine: Negative.  Negative for cold intolerance, heat intolerance, polydipsia, polyphagia and polyuria.   Genitourinary: Negative.  Negative for difficulty urinating, dysuria, flank pain, frequency, hematuria, pelvic pain, urgency, vaginal bleeding and vaginal discharge.   Musculoskeletal: Negative.  Negative for arthralgias, back pain, gait problem, joint swelling, myalgias and neck pain.   Skin: Negative for color change, pallor, rash and wound.   Allergic/Immunologic: Negative.  Negative for food allergies and immunocompromised state.   Neurological: Positive for weakness. Negative for dizziness, tremors, seizures, syncope, facial asymmetry, speech difficulty, light-headedness, numbness and headaches.   Hematological: Negative.  Negative for adenopathy. Does not bruise/bleed  easily.   Psychiatric/Behavioral: Negative.  Negative for agitation, confusion, hallucinations, sleep disturbance and suicidal ideas. The patient is not nervous/anxious.    All other systems reviewed and are negative.    Objective:     Vital Signs (Most Recent):  Temp: (!) 103 °F (39.4 °C) (07/07/18 1409)  Pulse: 98 (07/07/18 1409)  Resp: 16 (07/07/18 1409)  BP: (!) 113/56 (07/07/18 1409)  SpO2: 96 % (07/07/18 1409) Vital Signs (24h Range):  Temp:  [99.6 °F (37.6 °C)-103.7 °F (39.8 °C)] 103 °F (39.4 °C)  Pulse:  [] 98  Resp:  [16-32] 16  SpO2:  [96 %-100 %] 96 %  BP: ()/(56-64) 113/56     Weight: 59.5 kg (131 lb 2.8 oz)  Body mass index is 21.83 kg/m².    Physical Exam   Constitutional: She is oriented to person, place, and time. She appears well-developed and well-nourished. No distress.   HENT:   Head: Normocephalic and atraumatic.   Nose: Nose normal.   Eyes: Conjunctivae and EOM are normal. Pupils are equal, round, and reactive to light. Right eye exhibits no discharge. Left eye exhibits no discharge.   Neck: Normal range of motion. Neck supple. No JVD present. No tracheal deviation present. No thyromegaly present.   Cardiovascular: Normal rate, regular rhythm and normal heart sounds.  Exam reveals no gallop and no friction rub.    No murmur heard.  Pulmonary/Chest: Effort normal and breath sounds normal. No stridor. No respiratory distress. She has no wheezes. She has no rales. She exhibits no tenderness.   Abdominal: Soft. Bowel sounds are normal. She exhibits distension. She exhibits no mass. There is tenderness. There is guarding.   Musculoskeletal: She exhibits deformity (mouth). She exhibits no edema or tenderness.   Right sided weakness   Lymphadenopathy:     She has no cervical adenopathy.   Neurological: She is alert and oriented to person, place, and time. She has normal reflexes. No cranial nerve deficit. Coordination normal.   Skin: Skin is warm and dry. No rash noted. She is not  diaphoretic. No erythema. No pallor.   Psychiatric: She has a normal mood and affect. Her behavior is normal. Judgment and thought content normal.   Nursing note and vitals reviewed.        CRANIAL NERVES     CN III, IV, VI   Pupils are equal, round, and reactive to light.  Extraocular motions are normal.      Significant Labs:   CBC:   Recent Labs  Lab 07/07/18  0945   WBC 2.95*   HGB 13.1   HCT 39.6        CMP:   Recent Labs  Lab 07/07/18  0945      K 4.0      CO2 19*   *   BUN 11   CREATININE 0.9   CALCIUM 10.4   PROT 8.4   ALBUMIN 4.1   BILITOT 1.7*   ALKPHOS 127   AST 22   ALT 16   ANIONGAP 14   EGFRNONAA >60   Lactic Acid 2.5    Significant Imaging: I have reviewed all pertinent imaging results/findings within the past 24 hours.   Imaging Results          CT Abdomen Pelvis With Contrast (Final result)  Result time 07/07/18 11:30:11    Final result by Arpan Del Castillo MD (07/07/18 11:30:11)                 Impression:      1.  Significant thickening of the mid sigmoid colon associated with diverticula, with surrounding edema.  Associated ascites in the cul-de-sac and around the liver.  Findings are concerning for uncomplicated diverticulitis of the sigmoid colon.  Negative for free air or focal drainable fluid collection.    2. Negative for acute process involving the abdomen or pelvis otherwise.  Normal appendix.  Negative for renal stone disease or hydronephrosis.    3.  Moderate to severe multifactorial spinal canal stenosis at L4/L5 and L5/S1.  Clinical correlation is advised.    4.  Avascular necrosis changes to the left greater than right hip joints.  Clinical correlation is advised.    5.  Cholelithiasis.  Small fat filled umbilical hernia.  Other nonemergent findings as described above.    All CT scans at this facility used dose modulation, iterative reconstruction, and/or weight based dosing when appropriate to reduce radiation dose to as low as reasonably  achievable.      Electronically signed by: Arpan Del Castillo MD  Date:    07/07/2018  Time:    11:30             Narrative:    EXAMINATION:  CT ABDOMEN PELVIS WITH CONTRAST    CLINICAL HISTORY:  Lower abdominal pain;    TECHNIQUE:  Axial images through the abdomen and pelvis were obtained with the use of IV contrast.  Sagittal and coronal reconstructions are provided for review.  Oral contrast was not utilized.    COMPARISON:  None    FINDINGS:  No comparison studies are available.    LUNG BASES:   Lung bases are clear.  Negative for pleural or pericardial effusions. The distal esophagus is normal.    ABDOMEN: Gallstones.  The liver, spleen and gallbladder otherwise appear normal.  The pancreas is unremarkable.  Kidneys and adrenal glands are normal.    Negative for adenopathy noted within the abdomen or pelvis.  Vascular calcifications are present without aneurysmal changes. Portal vein is patent.    The stomach and small bowel appear normal.  There is significant edema within sigmoid colon with associated diverticula.  With moderate surrounding edema.  Mild to moderate ascites in the cul-de-sac and mild ascites around the liver.  The rest of the colon appears normal.  A normal appendix.    PELVIS: The urinary bladder is unremarkable.    The female pelvic organs are normal. There are pelvic phleboliths.    No significant osseous abnormality is identified.  Broad-based disc bulge at L4/L5 and L5/S1 with moderate to severe multifactorial spinal canal stenosis.  There are significant degenerative changes a changes of the femoral heads.  Osteopenia.    Negative for groin adenopathy.    Tiny fat filled umbilical hernia.  The abdominal wall is otherwise intact.                               X-Ray Chest AP Portable (Final result)  Result time 07/07/18 10:18:44    Final result by Arpan Del Castillo MD (07/07/18 10:18:44)                 Impression:      1.  Negative for acute process involving the chest, considering there are  lower lung volumes on today's study.    2.  Stable findings as noted above.      Electronically signed by: Arpan Del Castillo MD  Date:    07/07/2018  Time:    10:18             Narrative:    EXAMINATION:  XR CHEST AP PORTABLE    CLINICAL HISTORY:  Sepsis;    COMPARISON:  2 June 2014    FINDINGS:  EKG leads overlie the chest.  Mildly low lung volumes.  The lungs are otherwise clear.  The cardiac silhouette size is normal. The trachea is midline and the mediastinal width is normal. Negative for focal infiltrate, effusion or pneumothorax. Pulmonary vasculature is normal. Negative for osseous abnormalities. Tortuous aorta.  Convex left curvature of the lower thoracic spine.  Stable calcifications within the central portions of both humeri, possibly bone infarctions.                                  Assessment/Plan:     * Diverticulitis large intestine w/o perforation or abscess w/o bleeding    -IV Flagyl and Cipro  -IV fluids  -Vitals q 4 hours  -CT scan: diverticulitis without abscess  -IV Ofirmiv: pain control and fever        HIV (human immunodeficiency virus infection)    Resume home meds          Tobacco abuse    Tobacco abuse  -smoking cessation counseling  -nicotine patch            Slow transit constipation    -miralax  -senokot-S - 2 tabs daily.          Lactic acidosis    -ER 2.5  -repeat             VTE Risk Mitigation         Ordered     IP VTE LOW RISK PATIENT  Once      07/07/18 1357     Place sequential compression device  Until discontinued      07/07/18 1357        Светлана Cruz NP  Department of Hospital Medicine   [facility]

## 2018-07-07 NOTE — HPI
Lizz Noguera is a 57 y.o. female patient with a PMHx of HIV, CVA with right sided residual weakness, HLP, depression, who presented to the ER for suprapubic abd pain which onset suddenly this AM. Associated sxs included constipation. Patient denies any n/v/d, hematochezia, dysuria, hematuria, difficulty urinating, frequency, fever, chills, and all other sxs at this time. Pt denies any history of abdominal surgeries. She still has her appendix. CT scan showed uncomplicated diverticulitis of the sigmoid without abscess. Blood cultures pending. In ER, Temp 103.7, glucose 148, lactic acid 2.5. She is admitted with diverticulitis on IV flagyl and IV cipro.

## 2018-07-07 NOTE — ASSESSMENT & PLAN NOTE
-IV Flagyl and Cipro  -IV fluids  -Vitals q 4 hours  -CT scan: diverticulitis without abscess  -IV Ofirmiv: pain control and fever

## 2018-07-07 NOTE — PT/OT/SLP PROGRESS
Physical Therapy      Patient Name:  Lizz Noguera   MRN:  [id number]    Eval initiated via chart review in [facility]. Patient requested to start PT in .    Tushar Landaverde, PT

## 2018-07-08 LAB
ALBUMIN SERPL BCP-MCNC: 2.9 G/DL
ALP SERPL-CCNC: 107 U/L
ALT SERPL W/O P-5'-P-CCNC: 13 U/L
ANION GAP SERPL CALC-SCNC: 10 MMOL/L
AST SERPL-CCNC: 27 U/L
BASOPHILS # BLD AUTO: 0.01 K/UL
BASOPHILS NFR BLD: 0.1 %
BILIRUB SERPL-MCNC: 2.1 MG/DL
BUN SERPL-MCNC: 11 MG/DL
CALCIUM SERPL-MCNC: 9.1 MG/DL
CHLORIDE SERPL-SCNC: 108 MMOL/L
CO2 SERPL-SCNC: 18 MMOL/L
CREAT SERPL-MCNC: 0.7 MG/DL
DIFFERENTIAL METHOD: ABNORMAL
EOSINOPHIL # BLD AUTO: 0 K/UL
EOSINOPHIL NFR BLD: 0 %
ERYTHROCYTE [DISTWIDTH] IN BLOOD BY AUTOMATED COUNT: 12.8 %
EST. GFR  (AFRICAN AMERICAN): >60 ML/MIN/1.73 M^2
EST. GFR  (NON AFRICAN AMERICAN): >60 ML/MIN/1.73 M^2
ESTIMATED AVG GLUCOSE: 88 MG/DL
GLUCOSE SERPL-MCNC: 97 MG/DL
HBA1C MFR BLD HPLC: 4.7 %
HCT VFR BLD AUTO: 32.1 %
HGB BLD-MCNC: 10.4 G/DL
LYMPHOCYTES # BLD AUTO: 1.5 K/UL
LYMPHOCYTES NFR BLD: 17.9 %
MCH RBC QN AUTO: 31 PG
MCHC RBC AUTO-ENTMCNC: 32.4 G/DL
MCV RBC AUTO: 96 FL
MONOCYTES # BLD AUTO: 0.5 K/UL
MONOCYTES NFR BLD: 6 %
NEUTROPHILS # BLD AUTO: 6.2 K/UL
NEUTROPHILS NFR BLD: 76 %
PLATELET # BLD AUTO: 153 K/UL
PMV BLD AUTO: 10.3 FL
POCT GLUCOSE: 119 MG/DL (ref 70–110)
POCT GLUCOSE: 126 MG/DL (ref 70–110)
POCT GLUCOSE: 93 MG/DL (ref 70–110)
POTASSIUM SERPL-SCNC: 3.7 MMOL/L
PROT SERPL-MCNC: 6.3 G/DL
RBC # BLD AUTO: 3.36 M/UL
SODIUM SERPL-SCNC: 136 MMOL/L
WBC # BLD AUTO: 8.21 K/UL

## 2018-07-08 PROCEDURE — 63600175 PHARM REV CODE 636 W HCPCS: Performed by: NURSE PRACTITIONER

## 2018-07-08 PROCEDURE — 11000001 HC ACUTE MED/SURG PRIVATE ROOM

## 2018-07-08 PROCEDURE — 83036 HEMOGLOBIN GLYCOSYLATED A1C: CPT

## 2018-07-08 PROCEDURE — 25000003 PHARM REV CODE 250: Performed by: NURSE PRACTITIONER

## 2018-07-08 PROCEDURE — 97161 PT EVAL LOW COMPLEX 20 MIN: CPT

## 2018-07-08 PROCEDURE — 25000003 PHARM REV CODE 250: Performed by: EMERGENCY MEDICINE

## 2018-07-08 PROCEDURE — 85025 COMPLETE CBC W/AUTO DIFF WBC: CPT

## 2018-07-08 PROCEDURE — 80053 COMPREHEN METABOLIC PANEL: CPT

## 2018-07-08 PROCEDURE — 99900038 HC OT GENERIC THERAPY SCREENING (STAT)

## 2018-07-08 PROCEDURE — 97530 THERAPEUTIC ACTIVITIES: CPT

## 2018-07-08 PROCEDURE — S0030 INJECTION, METRONIDAZOLE: HCPCS | Performed by: EMERGENCY MEDICINE

## 2018-07-08 PROCEDURE — G8979 MOBILITY GOAL STATUS: HCPCS | Mod: CI

## 2018-07-08 PROCEDURE — 63600175 PHARM REV CODE 636 W HCPCS: Performed by: EMERGENCY MEDICINE

## 2018-07-08 PROCEDURE — G8978 MOBILITY CURRENT STATUS: HCPCS | Mod: CJ

## 2018-07-08 PROCEDURE — 36415 COLL VENOUS BLD VENIPUNCTURE: CPT

## 2018-07-08 RX ORDER — SYRING-NEEDL,DISP,INSUL,0.3 ML 29 G X1/2"
296 SYRINGE, EMPTY DISPOSABLE MISCELLANEOUS ONCE
Status: COMPLETED | OUTPATIENT
Start: 2018-07-08 | End: 2018-07-08

## 2018-07-08 RX ORDER — MORPHINE SULFATE 4 MG/ML
2 INJECTION, SOLUTION INTRAMUSCULAR; INTRAVENOUS ONCE
Status: COMPLETED | OUTPATIENT
Start: 2018-07-08 | End: 2018-07-08

## 2018-07-08 RX ADMIN — METRONIDAZOLE 500 MG: 500 INJECTION, SOLUTION INTRAVENOUS at 12:07

## 2018-07-08 RX ADMIN — STANDARDIZED SENNA CONCENTRATE AND DOCUSATE SODIUM 2 TABLET: 8.6; 5 TABLET, FILM COATED ORAL at 09:07

## 2018-07-08 RX ADMIN — ACETAMINOPHEN 1000 MG: 10 INJECTION, SOLUTION INTRAVENOUS at 05:07

## 2018-07-08 RX ADMIN — POLYETHYLENE GLYCOL (3350) 17 G: 17 POWDER, FOR SOLUTION ORAL at 09:07

## 2018-07-08 RX ADMIN — ACETAMINOPHEN 1000 MG: 10 INJECTION, SOLUTION INTRAVENOUS at 02:07

## 2018-07-08 RX ADMIN — ROSUVASTATIN CALCIUM 20 MG: 10 TABLET, FILM COATED ORAL at 09:07

## 2018-07-08 RX ADMIN — CYPROHEPTADINE HYDROCHLORIDE 4 MG: 4 TABLET ORAL at 09:07

## 2018-07-08 RX ADMIN — METRONIDAZOLE 500 MG: 500 INJECTION, SOLUTION INTRAVENOUS at 02:07

## 2018-07-08 RX ADMIN — MORPHINE SULFATE 2 MG: 4 INJECTION INTRAVENOUS at 11:07

## 2018-07-08 RX ADMIN — SODIUM CHLORIDE: 0.9 INJECTION, SOLUTION INTRAVENOUS at 04:07

## 2018-07-08 RX ADMIN — CYPROHEPTADINE HYDROCHLORIDE 4 MG: 4 TABLET ORAL at 02:07

## 2018-07-08 RX ADMIN — BUPROPION HYDROCHLORIDE 150 MG: 150 TABLET, FILM COATED, EXTENDED RELEASE ORAL at 09:07

## 2018-07-08 RX ADMIN — SODIUM CHLORIDE: 0.9 INJECTION, SOLUTION INTRAVENOUS at 02:07

## 2018-07-08 RX ADMIN — PIPERACILLIN SODIUM AND TAZOBACTAM SODIUM 4.5 G: 4; .5 INJECTION, POWDER, LYOPHILIZED, FOR SOLUTION INTRAVENOUS at 09:07

## 2018-07-08 RX ADMIN — FERROUS GLUCONATE TAB 324 MG (37.5 MG ELEMENTAL IRON) 324 MG: 324 (37.5 FE) TAB at 09:07

## 2018-07-08 RX ADMIN — CIPROFLOXACIN 400 MG: 2 INJECTION, SOLUTION INTRAVENOUS at 11:07

## 2018-07-08 RX ADMIN — METRONIDAZOLE 500 MG: 500 INJECTION, SOLUTION INTRAVENOUS at 07:07

## 2018-07-08 RX ADMIN — ACETAMINOPHEN 1000 MG: 10 INJECTION, SOLUTION INTRAVENOUS at 09:07

## 2018-07-08 RX ADMIN — Medication 296 ML: at 10:07

## 2018-07-08 NOTE — PLAN OF CARE
07/08/18 1009   Medicare Message   Important Message from Medicare regarding Discharge Appeal Rights Given to patient/caregiver;Explained to patient/caregiver;Signed/date by patient/caregiver   Date IMM was signed 07/08/18   Time IMM was signed 0941

## 2018-07-08 NOTE — PLAN OF CARE
Problem: Patient Care Overview  Goal: Plan of Care Review  Outcome: Ongoing (interventions implemented as appropriate)  Remained free from injury. Tolerating diet. Hypoactive bowel sounds. No flatulence or BM this shift. Ambulating with assist. Continuing iv abx. 12 hour chart check complete.

## 2018-07-08 NOTE — PLAN OF CARE
Assessment completed.  Met with the patient/ family. CM explained and left info in blue transition of care folder regarding Advance Directives, Living Will ( FULL CODE  ) ,  Smoking Cessation- smoke 1 ppd  , Pamphlet on D/C planning on admission and Pharmacy bedside delivery.  The role of CM explained for  transitions of care/ discharge planning. Patient stated admitted because abd pain that had started this am and is severe. Significant other stated she has not had a BM in 4 days.  Patient Dx with Diverticulitis. This is new for the patient. She has HX of HIV and CVA with rt sided weakness. Patient may benefit from HH due to new dx and education on dx, dietary /med management.  Patient has family support of her significant other and her son Harshil Noguera [phone]. Patient has Medicare insurance. Patient has no needs at this time. CM to f/u for safe transition  1500 - .  Cm reported finding to Israel OWENS. He stated he will  order it . Cm brought patient a lisiting of  [employer] and she asked if she could look at it and get back with CM.                     07/08/18 1005   Discharge Assessment   Assessment Type Discharge Planning Assessment   Confirmed/corrected address and phone number on facesheet? Yes   Assessment information obtained from? Patient;Caregiver;Medical Record   Expected Length of Stay (days) (TBD)   Communicated expected length of stay with patient/caregiver no   Prior to hospitilization cognitive status: Alert/Oriented   Prior to hospitalization functional status: Independent   Current cognitive status: Alert/Oriented   Current Functional Status: Independent   Facility Arrived From: NA   Lives With significant other  (Hi Sena)   Able to Return to Prior Arrangements yes   Is patient able to care for self after discharge? Yes   Who are your caregiver(s) and their phone number(s)? (Hi Sena ( significant other ) [phone])   Patient's perception of discharge disposition  home or selfcare   Readmission Within The Last 30 Days no previous admission in last 30 days   Patient currently being followed by outpatient case management? No   Patient currently receives any other outside agency services? No   Equipment Currently Used at Home none   Do you have any problems affording any of your prescribed medications? No   Is the patient taking medications as prescribed? yes   Does the patient have transportation home? Yes   Transportation Available family or friend will provide   Does the patient receive services at the [facility]? No   Discharge Plan A Home with family;Home Health   Discharge Plan B Home with family   Patient/Family In Agreement With Plan yes

## 2018-07-08 NOTE — ASSESSMENT & PLAN NOTE
Admit to Inpatient   -IV Flagyl and Cipro  -IV fluids  -Vitals q 4 hours  -CT scan: diverticulitis without abscess  -IV Ofirmiv: pain control and fever    7/8/2018- Continue IV Flagyl and Cipro, clear liquid diet and IVF, pain and fever control.

## 2018-07-08 NOTE — NURSING
Temperature 103.1, scheduled Ofirmev given, recheck temp revealed 102.8 Order for Ibuprofen obtained and administered. Bilateral axillary ice packs provided, sheet only on pt. Will monitor.    2300-temp 99.3. Will monitor.

## 2018-07-08 NOTE — PLAN OF CARE
Problem: Patient Care Overview  Goal: Plan of Care Review  Outcome: Ongoing (interventions implemented as appropriate)  Pt experienced fever Tmax 103.1 this shift. Controlled at this time, see previous note. IVF/abx infused as ordered. Voiding spontaneously. Scheduled Ofirmev for pain, moderate control achieved. Denies n/v. Chart reviewed. Will monitor.

## 2018-07-08 NOTE — PT/OT/SLP PROGRESS
Occupational Therapy      Patient Name:  Lizz Noguera   MRN:  [id number]   Eval initiated via chart review. Pt requested to continue with therapy on later date. Pt reported 10/10 stomach pain and nurse Derrell Burns complete eval on later date    Ania cMdonald OT  7/8/2018   11:11

## 2018-07-08 NOTE — SUBJECTIVE & OBJECTIVE
Past Medical History:   Diagnosis Date    Anemia     Cholelithiasis     CVA (cerebral vascular accident)     Diverticulitis     Glaucoma     HIV disease     HLD (hyperlipidemia)     HTN (hypertension)        Past Surgical History:   Procedure Laterality Date    NONE         Review of patient's allergies indicates:   Allergen Reactions    Bactrim [sulfamethoxazole-trimethoprim] Rash       No current facility-administered medications on file prior to encounter.      Current Outpatient Prescriptions on File Prior to Encounter   Medication Sig    emtricitabine-tenofovir 200-300 mg (TRUVADA) 200-300 mg Tab Take 1 tablet by mouth once daily.     Family History     Problem Relation (Age of Onset)    Stroke Mother        Social History Main Topics    Smoking status: Current Every Day Smoker     Packs/day: 1.00     Years: 46.00     Types: Cigarettes    Smokeless tobacco: Never Used    Alcohol use No    Drug use: No    Sexual activity: Yes     Partners: Male     Review of Systems   Constitutional: Positive for fatigue. Negative for chills and fever.   HENT: Negative for congestion, rhinorrhea and sinus pressure.    Respiratory: Negative for apnea, cough, choking, chest tightness, shortness of breath, wheezing and stridor.    Cardiovascular: Negative for chest pain, palpitations and leg swelling.   Gastrointestinal: Positive for abdominal pain and constipation. Negative for abdominal distention, diarrhea, nausea and vomiting.   Endocrine: Negative for cold intolerance and heat intolerance.   Genitourinary: Negative for difficulty urinating and hematuria.   Musculoskeletal: Negative for arthralgias and joint swelling.   Skin: Negative for color change, pallor and rash.   Neurological: Positive for weakness. Negative for dizziness, seizures, numbness and headaches.   Psychiatric/Behavioral: Negative for agitation. The patient is not nervous/anxious.      Objective:     Vital Signs (Most Recent):  Temp: 99 °F  (37.2 °C) (07/08/18 1149)  Pulse: 89 (07/08/18 1149)  Resp: 18 (07/08/18 1149)  BP: (!) 123/57 (07/08/18 1149)  SpO2: 96 % (07/08/18 1149) Vital Signs (24h Range):  Temp:  [99 °F (37.2 °C)-103.1 °F (39.5 °C)] 99 °F (37.2 °C)  Pulse:  [86-99] 89  Resp:  [16-20] 18  SpO2:  [94 %-98 %] 96 %  BP: (101-123)/(52-67) 123/57     Weight: 60.6 kg (133 lb 9.6 oz)  Body mass index is 22.23 kg/m².    Physical Exam   Constitutional: No distress.   Eyes: Right eye exhibits no discharge. Left eye exhibits no discharge.   Neck: No JVD present.   Cardiovascular: Exam reveals no gallop and no friction rub.    No murmur heard.  Pulmonary/Chest: No respiratory distress. She has no wheezes. She has no rales. She exhibits no tenderness.   Abdominal: She exhibits no distension and no mass. Bowel sounds are decreased. There is generalized tenderness and tenderness in the right lower quadrant. There is no rebound and no guarding. No hernia.   Musculoskeletal: She exhibits no edema or deformity.   Neurological: She is alert.   Skin: Skin is warm and dry. Capillary refill takes less than 2 seconds. She is not diaphoretic.   Psychiatric: She has a normal mood and affect.   Nursing note and vitals reviewed.          Significant Labs:   CBC:   Recent Labs  Lab 07/07/18  0945 07/08/18  0424   WBC 2.95* 8.21   HGB 13.1 10.4*   HCT 39.6 32.1*    153     CMP:   Recent Labs  Lab 07/07/18  0945 07/08/18  0424    136   K 4.0 3.7    108   CO2 19* 18*   * 97   BUN 11 11   CREATININE 0.9 0.7   CALCIUM 10.4 9.1   PROT 8.4 6.3   ALBUMIN 4.1 2.9*   BILITOT 1.7* 2.1*   ALKPHOS 127 107   AST 22 27   ALT 16 13   ANIONGAP 14 10   EGFRNONAA >60 >60       Significant Imaging:     Imaging Results          CT Abdomen Pelvis With Contrast (Final result)  Result time 07/07/18 11:30:11    Final result by Arpan Del Castillo MD (07/07/18 11:30:11)                 Impression:      1.  Significant thickening of the mid sigmoid colon associated with  diverticula, with surrounding edema.  Associated ascites in the cul-de-sac and around the liver.  Findings are concerning for uncomplicated diverticulitis of the sigmoid colon.  Negative for free air or focal drainable fluid collection.    2. Negative for acute process involving the abdomen or pelvis otherwise.  Normal appendix.  Negative for renal stone disease or hydronephrosis.    3.  Moderate to severe multifactorial spinal canal stenosis at L4/L5 and L5/S1.  Clinical correlation is advised.    4.  Avascular necrosis changes to the left greater than right hip joints.  Clinical correlation is advised.    5.  Cholelithiasis.  Small fat filled umbilical hernia.  Other nonemergent findings as described above.    All CT scans at this facility used dose modulation, iterative reconstruction, and/or weight based dosing when appropriate to reduce radiation dose to as low as reasonably achievable.      Electronically signed by: Arpan Del Castillo MD  Date:    07/07/2018  Time:    11:30             Narrative:    EXAMINATION:  CT ABDOMEN PELVIS WITH CONTRAST    CLINICAL HISTORY:  Lower abdominal pain;    TECHNIQUE:  Axial images through the abdomen and pelvis were obtained with the use of IV contrast.  Sagittal and coronal reconstructions are provided for review.  Oral contrast was not utilized.    COMPARISON:  None    FINDINGS:  No comparison studies are available.    LUNG BASES:   Lung bases are clear.  Negative for pleural or pericardial effusions. The distal esophagus is normal.    ABDOMEN: Gallstones.  The liver, spleen and gallbladder otherwise appear normal.  The pancreas is unremarkable.  Kidneys and adrenal glands are normal.    Negative for adenopathy noted within the abdomen or pelvis.  Vascular calcifications are present without aneurysmal changes. Portal vein is patent.    The stomach and small bowel appear normal.  There is significant edema within sigmoid colon with associated diverticula.  With moderate  surrounding edema.  Mild to moderate ascites in the cul-de-sac and mild ascites around the liver.  The rest of the colon appears normal.  A normal appendix.    PELVIS: The urinary bladder is unremarkable.    The female pelvic organs are normal. There are pelvic phleboliths.    No significant osseous abnormality is identified.  Broad-based disc bulge at L4/L5 and L5/S1 with moderate to severe multifactorial spinal canal stenosis.  There are significant degenerative changes a changes of the femoral heads.  Osteopenia.    Negative for groin adenopathy.    Tiny fat filled umbilical hernia.  The abdominal wall is otherwise intact.                               X-Ray Chest AP Portable (Final result)  Result time 07/07/18 10:18:44    Final result by Arpan Del Castillo MD (07/07/18 10:18:44)                 Impression:      1.  Negative for acute process involving the chest, considering there are lower lung volumes on today's study.    2.  Stable findings as noted above.      Electronically signed by: Arpan Del Castillo MD  Date:    07/07/2018  Time:    10:18             Narrative:    EXAMINATION:  XR CHEST AP PORTABLE    CLINICAL HISTORY:  Sepsis;    COMPARISON:  2 June 2014    FINDINGS:  EKG leads overlie the chest.  Mildly low lung volumes.  The lungs are otherwise clear.  The cardiac silhouette size is normal. The trachea is midline and the mediastinal width is normal. Negative for focal infiltrate, effusion or pneumothorax. Pulmonary vasculature is normal. Negative for osseous abnormalities. Tortuous aorta.  Convex left curvature of the lower thoracic spine.  Stable calcifications within the central portions of both humeri, possibly bone infarctions.

## 2018-07-08 NOTE — PROGRESS NOTES
[facility] Hospital Medicine  Progress Note    Patient Name: Lizz Noguera  MRN: [id number]  Patient Class: IP- Inpatient   Admission Date: 7/7/2018  Length of Stay: 1 days  Attending Physician: David Barnes MD  Primary Care Provider: Joseluis Doe MD (Inactive)        Subjective:     Principal Problem:Diverticulitis large intestine w/o perforation or abscess w/o bleeding    HPI:  Lizz Noguera is a 57 y.o. female patient with a PMHx of HIV, CVA with right sided residual weakness, HLP, depression, who presented to the ER for suprapubic abd pain which onset suddenly this AM. Associated sxs included constipation. Patient denies any n/v/d, hematochezia, dysuria, hematuria, difficulty urinating, frequency, fever, chills, and all other sxs at this time. Pt denies any history of abdominal surgeries. She still has her appendix. CT scan showed uncomplicated diverticulitis of the sigmoid without abscess. Blood cultures pending. In ER, Temp 103.7, glucose 148, lactic acid 2.5. She is admitted with diverticulitis on IV flagyl and IV cipro.     Hospital Course:  Ms Noguera is a 57 year old female with PMHx HIV, CVA, HLP who presented to complaints of lower abdominal pain. Associated symptoms include constipation. CT scan of the abdomen showed uncomplicated diverticulitis of the sigmoid without abscess. She is currently on Cipro IV and Flagyl IV. She received Senna-docusate and Miralax on yesterday with no BM results. She continues to complain of constipation. Will give mag citrate X 1 and monitor.    Past Medical History:   Diagnosis Date    Anemia     Cholelithiasis     CVA (cerebral vascular accident)     Diverticulitis     Glaucoma     HIV disease     HLD (hyperlipidemia)     HTN (hypertension)        Past Surgical History:   Procedure Laterality Date    NONE         Review of patient's allergies indicates:   Allergen Reactions    Bactrim [sulfamethoxazole-trimethoprim] Rash       No current  facility-administered medications on file prior to encounter.      Current Outpatient Prescriptions on File Prior to Encounter   Medication Sig    emtricitabine-tenofovir 200-300 mg (TRUVADA) 200-300 mg Tab Take 1 tablet by mouth once daily.     Family History     Problem Relation (Age of Onset)    Stroke Mother        Social History Main Topics    Smoking status: Current Every Day Smoker     Packs/day: 1.00     Years: 46.00     Types: Cigarettes    Smokeless tobacco: Never Used    Alcohol use No    Drug use: No    Sexual activity: Yes     Partners: Male     Review of Systems   Constitutional: Positive for fatigue. Negative for chills and fever.   HENT: Negative for congestion, rhinorrhea and sinus pressure.    Respiratory: Negative for apnea, cough, choking, chest tightness, shortness of breath, wheezing and stridor.    Cardiovascular: Negative for chest pain, palpitations and leg swelling.   Gastrointestinal: Positive for abdominal pain and constipation. Negative for abdominal distention, diarrhea, nausea and vomiting.   Endocrine: Negative for cold intolerance and heat intolerance.   Genitourinary: Negative for difficulty urinating and hematuria.   Musculoskeletal: Negative for arthralgias and joint swelling.   Skin: Negative for color change, pallor and rash.   Neurological: Positive for weakness. Negative for dizziness, seizures, numbness and headaches.   Psychiatric/Behavioral: Negative for agitation. The patient is not nervous/anxious.      Objective:     Vital Signs (Most Recent):  Temp: 99 °F (37.2 °C) (07/08/18 1149)  Pulse: 89 (07/08/18 1149)  Resp: 18 (07/08/18 1149)  BP: (!) 123/57 (07/08/18 1149)  SpO2: 96 % (07/08/18 1149) Vital Signs (24h Range):  Temp:  [99 °F (37.2 °C)-103.1 °F (39.5 °C)] 99 °F (37.2 °C)  Pulse:  [86-99] 89  Resp:  [16-20] 18  SpO2:  [94 %-98 %] 96 %  BP: (101-123)/(52-67) 123/57     Weight: 60.6 kg (133 lb 9.6 oz)  Body mass index is 22.23 kg/m².    Physical Exam    Constitutional: No distress.   Eyes: Right eye exhibits no discharge. Left eye exhibits no discharge.   Neck: No JVD present.   Cardiovascular: Exam reveals no gallop and no friction rub.    No murmur heard.  Pulmonary/Chest: No respiratory distress. She has no wheezes. She has no rales. She exhibits no tenderness.   Abdominal: She exhibits no distension and no mass. Bowel sounds are decreased. There is generalized tenderness and tenderness in the right lower quadrant. There is no rebound and no guarding. No hernia.   Musculoskeletal: She exhibits no edema or deformity.   Neurological: She is alert.   Skin: Skin is warm and dry. Capillary refill takes less than 2 seconds. She is not diaphoretic.   Psychiatric: She has a normal mood and affect.   Nursing note and vitals reviewed.          Significant Labs:   CBC:   Recent Labs  Lab 07/07/18  0945 07/08/18  0424   WBC 2.95* 8.21   HGB 13.1 10.4*   HCT 39.6 32.1*    153     CMP:   Recent Labs  Lab 07/07/18  0945 07/08/18  0424    136   K 4.0 3.7    108   CO2 19* 18*   * 97   BUN 11 11   CREATININE 0.9 0.7   CALCIUM 10.4 9.1   PROT 8.4 6.3   ALBUMIN 4.1 2.9*   BILITOT 1.7* 2.1*   ALKPHOS 127 107   AST 22 27   ALT 16 13   ANIONGAP 14 10   EGFRNONAA >60 >60       Significant Imaging:     Imaging Results          CT Abdomen Pelvis With Contrast (Final result)  Result time 07/07/18 11:30:11    Final result by Arpan Del Castillo MD (07/07/18 11:30:11)                 Impression:      1.  Significant thickening of the mid sigmoid colon associated with diverticula, with surrounding edema.  Associated ascites in the cul-de-sac and around the liver.  Findings are concerning for uncomplicated diverticulitis of the sigmoid colon.  Negative for free air or focal drainable fluid collection.    2. Negative for acute process involving the abdomen or pelvis otherwise.  Normal appendix.  Negative for renal stone disease or hydronephrosis.    3.  Moderate to  severe multifactorial spinal canal stenosis at L4/L5 and L5/S1.  Clinical correlation is advised.    4.  Avascular necrosis changes to the left greater than right hip joints.  Clinical correlation is advised.    5.  Cholelithiasis.  Small fat filled umbilical hernia.  Other nonemergent findings as described above.    All CT scans at this facility used dose modulation, iterative reconstruction, and/or weight based dosing when appropriate to reduce radiation dose to as low as reasonably achievable.      Electronically signed by: Arpan Del Castillo MD  Date:    07/07/2018  Time:    11:30             Narrative:    EXAMINATION:  CT ABDOMEN PELVIS WITH CONTRAST    CLINICAL HISTORY:  Lower abdominal pain;    TECHNIQUE:  Axial images through the abdomen and pelvis were obtained with the use of IV contrast.  Sagittal and coronal reconstructions are provided for review.  Oral contrast was not utilized.    COMPARISON:  None    FINDINGS:  No comparison studies are available.    LUNG BASES:   Lung bases are clear.  Negative for pleural or pericardial effusions. The distal esophagus is normal.    ABDOMEN: Gallstones.  The liver, spleen and gallbladder otherwise appear normal.  The pancreas is unremarkable.  Kidneys and adrenal glands are normal.    Negative for adenopathy noted within the abdomen or pelvis.  Vascular calcifications are present without aneurysmal changes. Portal vein is patent.    The stomach and small bowel appear normal.  There is significant edema within sigmoid colon with associated diverticula.  With moderate surrounding edema.  Mild to moderate ascites in the cul-de-sac and mild ascites around the liver.  The rest of the colon appears normal.  A normal appendix.    PELVIS: The urinary bladder is unremarkable.    The female pelvic organs are normal. There are pelvic phleboliths.    No significant osseous abnormality is identified.  Broad-based disc bulge at L4/L5 and L5/S1 with moderate to severe multifactorial  spinal canal stenosis.  There are significant degenerative changes a changes of the femoral heads.  Osteopenia.    Negative for groin adenopathy.    Tiny fat filled umbilical hernia.  The abdominal wall is otherwise intact.                               X-Ray Chest AP Portable (Final result)  Result time 07/07/18 10:18:44    Final result by Arpan Del Castillo MD (07/07/18 10:18:44)                 Impression:      1.  Negative for acute process involving the chest, considering there are lower lung volumes on today's study.    2.  Stable findings as noted above.      Electronically signed by: Arpan Del Castillo MD  Date:    07/07/2018  Time:    10:18             Narrative:    EXAMINATION:  XR CHEST AP PORTABLE    CLINICAL HISTORY:  Sepsis;    COMPARISON:  2 June 2014    FINDINGS:  EKG leads overlie the chest.  Mildly low lung volumes.  The lungs are otherwise clear.  The cardiac silhouette size is normal. The trachea is midline and the mediastinal width is normal. Negative for focal infiltrate, effusion or pneumothorax. Pulmonary vasculature is normal. Negative for osseous abnormalities. Tortuous aorta.  Convex left curvature of the lower thoracic spine.  Stable calcifications within the central portions of both humeri, possibly bone infarctions.                              Assessment/Plan:      * Diverticulitis large intestine w/o perforation or abscess w/o bleeding    Admit to Inpatient   -IV Flagyl and Cipro  -IV fluids  -Vitals q 4 hours  -CT scan: diverticulitis without abscess  -IV Ofirmiv: pain control and fever    7/8/2018- Continue IV Flagyl and Cipro, clear liquid diet and IVF, pain and fever control.         HIV (human immunodeficiency virus infection)    Resume home meds          Tobacco abuse    Tobacco abuse  -smoking cessation counseling  -nicotine patch            Slow transit constipation    - miralax daily   -senokot-S - 2 tabs daily.      7/8/2018- Magnesium Citrate X 1          Lactic acidosis    Lactic  acid 2.5 on admit and repeat 1.7              VTE Risk Mitigation         Ordered     IP VTE LOW RISK PATIENT  Once      07/07/18 1357     Place sequential compression device  Until discontinued      07/07/18 1357              Daryl Bunn NP  Department of Hospital Medicine   [facility]

## 2018-07-08 NOTE — HOSPITAL COURSE
"Lizz Noguera is a 57 year old female with PMHx HIV, CVA, HLP who presented to complaints of lower abdominal pain. Associated symptoms include constipation. CT scan of the abdomen showed uncomplicated diverticulitis of the sigmoid without abscess. She is currently on Cipro IV and Flagyl IV. She received Senna-docusate and Miralax on yesterday with no BM results. She continues to complain of constipation. Will give mag citrate X 1 and monitor. Fever overnight of 101.4 on 07/10/18. Blood cultures (+) gram negative rods, BACTEROIDES VULGATUS. Susceptibilities pending. Pt currently receiving IV Zosyn and Cipro. Diet advanced, tolerated without abdominal pain, N/V. Pt reports, "my stomach is bigger than normal, I can barely see my belly button." Mild distension noted. Due to fever and abdominal distension obtained repeat CT. CT abdomen/pelvis with contrast showed anterior pelvic abscess measuring 8.5 cm with several foci of free air adjacent to the sigmoid colon in the mesentery and liver likely from perforated diverticulum. General surgery consulted and recommended ct guided abscess drainage to be performed by IR. Antibiotics changed to IV Zosyn only. Repeat blood cultures with NGTD. S/p ct guided drainage of abscess, cultures sent which are currently pending. Has 2 JOSE drains placed by IR. Max temp of 101.2 overnight. Repeat blood cultures NGTD. Discussed case with ID who recommended repeat CT chest/abdomen/pelvis due to continued high grade fevers. Abdominal pain is improving, reporting bowel movements are becoming more formed. Per General surgery, may still require sigmoid resection for diverticulitis however there is also the possibility that she could develop a small-bowel obstruction and this will be monitored.  Repeat CT chest/abdomen/pelvis with contrast showed worsening multilobar pneumonia and mild enlargement of a small left lower anterior abdominal fluid collection from 2.1 to 2.3 cm which may represent a " developing abscesses. Discussed case with Dr. Colindres -- continues to have fever, could be from pneumonia and/or abscess. Recommended consulting IR and see if the abscess could be aspirated. No need for surgical procedure at this time. SLP evaluation for possible aspiration. Antibiotics changed due to wound cultures (+) enterococcus avium (susceptibilities pending) and candida glabrata. Vancomycin d/c. IV Zyvox and Micafungin added by ID. IV Zosyn continued. NPO after midnight -- small increase in size of anterior fluid collection. High grade temps appear to be improving after changes in antibiotics. MBSS ordered for possible aspiration due to worsening mulilobar pnuemonia seen on imaging on 07/13/18. Concerns regarding barium with pt diagnosis of possible perforated diverticulum and abscess. SLP evaluated -- no coughing or delayed swallowing noted. Likely not aspirating.  MBSS discontinued. IR consulted for small increase in size of anterior fluid collection -- less than 5 ml of reddish cloudy fluid removed. Wound cultures grew candida glabrata and enterococcus avium. Susceptibilities reviewed. Discussed case with ID  -- recommended Unasyn 3 gm IV every 6 hours and Micafungin 100 mg IV daily, both for 2 weeks upon discharge then repeat CT post antibiotics. PICC line placed for IV antibiotic therapy. Pt has been afebrile > 48 hrs. VSS. Pt has been set up with [facility]  for PICC /care and JOSE drain care. [facility] for antibiotic therapy. Pt will follow up with Dr. Colindres (general surgery) on 07/23/18 at 9:40 AM for hospital follow up. Pt will need repeat CT abdomen/pelvis prior to clinic appt. Pt informed and verbalized understanding. Pt will also follow up with Dr. Neal (ID) within 1-2 weeks after discharge for hospital follow up. This patient was seen and examined on the date of discharge and determined suitable for discharge.

## 2018-07-09 LAB
ALBUMIN SERPL BCP-MCNC: 2.5 G/DL
ALP SERPL-CCNC: 95 U/L
ALT SERPL W/O P-5'-P-CCNC: 12 U/L
ANION GAP SERPL CALC-SCNC: 9 MMOL/L
AST SERPL-CCNC: 29 U/L
BASOPHILS # BLD AUTO: 0 K/UL
BASOPHILS NFR BLD: 0 %
BILIRUB SERPL-MCNC: 1.8 MG/DL
BUN SERPL-MCNC: 10 MG/DL
CALCIUM SERPL-MCNC: 9.2 MG/DL
CHLORIDE SERPL-SCNC: 107 MMOL/L
CO2 SERPL-SCNC: 20 MMOL/L
CREAT SERPL-MCNC: 0.7 MG/DL
DIFFERENTIAL METHOD: ABNORMAL
EOSINOPHIL # BLD AUTO: 0 K/UL
EOSINOPHIL NFR BLD: 0.4 %
ERYTHROCYTE [DISTWIDTH] IN BLOOD BY AUTOMATED COUNT: 12.5 %
EST. GFR  (AFRICAN AMERICAN): >60 ML/MIN/1.73 M^2
EST. GFR  (NON AFRICAN AMERICAN): >60 ML/MIN/1.73 M^2
GLUCOSE SERPL-MCNC: 112 MG/DL
HCT VFR BLD AUTO: 29.7 %
HGB BLD-MCNC: 9.9 G/DL
LYMPHOCYTES # BLD AUTO: 0.7 K/UL
LYMPHOCYTES NFR BLD: 8.9 %
MCH RBC QN AUTO: 31.2 PG
MCHC RBC AUTO-ENTMCNC: 33.3 G/DL
MCV RBC AUTO: 94 FL
MONOCYTES # BLD AUTO: 0.1 K/UL
MONOCYTES NFR BLD: 1.8 %
NEUTROPHILS # BLD AUTO: 6.6 K/UL
NEUTROPHILS NFR BLD: 89.6 %
PLATELET # BLD AUTO: 133 K/UL
PMV BLD AUTO: 10 FL
POCT GLUCOSE: 101 MG/DL (ref 70–110)
POCT GLUCOSE: 101 MG/DL (ref 70–110)
POTASSIUM SERPL-SCNC: 3.2 MMOL/L
PROT SERPL-MCNC: 5.9 G/DL
RBC # BLD AUTO: 3.17 M/UL
SODIUM SERPL-SCNC: 136 MMOL/L
WBC # BLD AUTO: 7.41 K/UL

## 2018-07-09 PROCEDURE — G8987 SELF CARE CURRENT STATUS: HCPCS | Mod: CJ

## 2018-07-09 PROCEDURE — G8989 SELF CARE D/C STATUS: HCPCS | Mod: CJ

## 2018-07-09 PROCEDURE — 97530 THERAPEUTIC ACTIVITIES: CPT

## 2018-07-09 PROCEDURE — 25000003 PHARM REV CODE 250: Performed by: NURSE PRACTITIONER

## 2018-07-09 PROCEDURE — G8988 SELF CARE GOAL STATUS: HCPCS | Mod: CJ

## 2018-07-09 PROCEDURE — 97116 GAIT TRAINING THERAPY: CPT

## 2018-07-09 PROCEDURE — 11000001 HC ACUTE MED/SURG PRIVATE ROOM

## 2018-07-09 PROCEDURE — 36415 COLL VENOUS BLD VENIPUNCTURE: CPT

## 2018-07-09 PROCEDURE — 85025 COMPLETE CBC W/AUTO DIFF WBC: CPT

## 2018-07-09 PROCEDURE — 25000003 PHARM REV CODE 250: Performed by: EMERGENCY MEDICINE

## 2018-07-09 PROCEDURE — 80053 COMPREHEN METABOLIC PANEL: CPT

## 2018-07-09 PROCEDURE — 63600175 PHARM REV CODE 636 W HCPCS: Performed by: NURSE PRACTITIONER

## 2018-07-09 PROCEDURE — 63600175 PHARM REV CODE 636 W HCPCS: Performed by: EMERGENCY MEDICINE

## 2018-07-09 PROCEDURE — 97165 OT EVAL LOW COMPLEX 30 MIN: CPT

## 2018-07-09 PROCEDURE — S0030 INJECTION, METRONIDAZOLE: HCPCS | Performed by: EMERGENCY MEDICINE

## 2018-07-09 RX ORDER — POTASSIUM CHLORIDE 20 MEQ/1
40 TABLET, EXTENDED RELEASE ORAL ONCE
Status: COMPLETED | OUTPATIENT
Start: 2018-07-09 | End: 2018-07-09

## 2018-07-09 RX ADMIN — METRONIDAZOLE 500 MG: 500 INJECTION, SOLUTION INTRAVENOUS at 11:07

## 2018-07-09 RX ADMIN — CYPROHEPTADINE HYDROCHLORIDE 4 MG: 4 TABLET ORAL at 03:07

## 2018-07-09 RX ADMIN — POTASSIUM CHLORIDE 40 MEQ: 1500 TABLET, EXTENDED RELEASE ORAL at 11:07

## 2018-07-09 RX ADMIN — POLYETHYLENE GLYCOL (3350) 17 G: 17 POWDER, FOR SOLUTION ORAL at 09:07

## 2018-07-09 RX ADMIN — SODIUM CHLORIDE: 0.9 INJECTION, SOLUTION INTRAVENOUS at 10:07

## 2018-07-09 RX ADMIN — METRONIDAZOLE 500 MG: 500 INJECTION, SOLUTION INTRAVENOUS at 09:07

## 2018-07-09 RX ADMIN — STANDARDIZED SENNA CONCENTRATE AND DOCUSATE SODIUM 2 TABLET: 8.6; 5 TABLET, FILM COATED ORAL at 09:07

## 2018-07-09 RX ADMIN — CIPROFLOXACIN 400 MG: 2 INJECTION, SOLUTION INTRAVENOUS at 01:07

## 2018-07-09 RX ADMIN — PIPERACILLIN SODIUM AND TAZOBACTAM SODIUM 4.5 G: 4; .5 INJECTION, POWDER, LYOPHILIZED, FOR SOLUTION INTRAVENOUS at 10:07

## 2018-07-09 RX ADMIN — CIPROFLOXACIN 400 MG: 2 INJECTION, SOLUTION INTRAVENOUS at 03:07

## 2018-07-09 RX ADMIN — CYPROHEPTADINE HYDROCHLORIDE 4 MG: 4 TABLET ORAL at 09:07

## 2018-07-09 RX ADMIN — METRONIDAZOLE 500 MG: 500 INJECTION, SOLUTION INTRAVENOUS at 04:07

## 2018-07-09 RX ADMIN — FERROUS GLUCONATE TAB 324 MG (37.5 MG ELEMENTAL IRON) 324 MG: 324 (37.5 FE) TAB at 09:07

## 2018-07-09 RX ADMIN — PIPERACILLIN SODIUM AND TAZOBACTAM SODIUM 4.5 G: 4; .5 INJECTION, POWDER, LYOPHILIZED, FOR SOLUTION INTRAVENOUS at 05:07

## 2018-07-09 RX ADMIN — ROSUVASTATIN CALCIUM 20 MG: 10 TABLET, FILM COATED ORAL at 09:07

## 2018-07-09 RX ADMIN — BUPROPION HYDROCHLORIDE 150 MG: 150 TABLET, FILM COATED, EXTENDED RELEASE ORAL at 09:07

## 2018-07-09 RX ADMIN — PIPERACILLIN SODIUM AND TAZOBACTAM SODIUM 4.5 G: 4; .5 INJECTION, POWDER, LYOPHILIZED, FOR SOLUTION INTRAVENOUS at 03:07

## 2018-07-09 NOTE — PLAN OF CARE
Problem: Patient Care Overview  Goal: Plan of Care Review  Outcome: Ongoing (interventions implemented as appropriate)  Remained free from injury. No insulin required per sliding scale. Tolerated full liquid diet for breakfast. One episode of vomiting after diet was advanced to regular diet for lunch. Provider informed. Continuing iv abx. Ambulating with assist. 12 hour chart check complete.

## 2018-07-09 NOTE — ASSESSMENT & PLAN NOTE
- miralax daily   -senokot-S - 2 tabs daily.      7/8/2018- Magnesium Citrate X 1    7/9/2018- Resolved.

## 2018-07-09 NOTE — PT/OT/SLP EVAL
Physical Therapy Evaluation    Patient Name:  Lizz Noguera   MRN:  [id number]    Recommendations:     Discharge Recommendations:  home   Discharge Equipment Recommendations: none   Barriers to discharge: None    Assessment:     Lizz Noguera is a 57 y.o. female admitted with a medical diagnosis of Diverticulitis large intestine w/o perforation or abscess w/o bleeding.  She presents with the following impairments/functional limitations:  weakness, impaired self care skills, impaired balance, impaired endurance, impaired functional mobilty, gait instability, decreased lower extremity function, pain, decreased coordination, decreased safety awareness.    Rehab Prognosis:  good; patient would benefit from acute skilled PT services to address these deficits and reach maximum level of function.      Recent Surgery: * No surgery found *      Plan:     During this hospitalization, patient to be seen 5 x/week to address the above listed problems via gait training, therapeutic activities, therapeutic exercises  · Plan of Care Expires:      Plan of Care Reviewed with: patient, spouse    Subjective     Communicated with jonathan prior to session.  Patient found sitting on eob upon PT entry to room, agreeable to evaluation.      Chief Complaint: pain/weakness  Patient comments/goals: to go home  Pain/Comfort:  ·      Patients cultural, spiritual, [employer]ism conflicts given the current situation:      Living Environment:  With  - no steps  Prior to admission, patients level of function was indep.  Patient has the following equipment: none.  DME owned (not currently used): none.  Upon discharge, patient will have assistance from .    Objective:     Patient found with: peripheral IV     General Precautions: Standard, fall   Orthopedic Precautions:N/A   Braces:       Exams:  · b le rom wfl and 4-/5 strength grossly    Functional Mobility:  · Bed Mobility - sba  · Transfers - cg/sba no ad  · Gt - 20 ft in room cga  handheld A unsteady but no lob    AM-PAC 6 CLICK MOBILITY  Total Score:        Therapeutic Activities and Exercises:   PT educated patient/cg on poc, safety with mobility, up with asisist d/t weakness and general le exs to prep mobility.    Patient left on eob with all lines intact, call button in reach, nurse notified and family present.    GOALS:    Physical Therapy Goals        Problem: Physical Therapy Goal    Goal Priority Disciplines Outcome Goal Variances Interventions   Physical Therapy Goal     PT/OT, PT      Description:  ltg's for PT by 7/15/18  1. Patient will perform sit to stand indep  2. Patient will amb mod indep x 300ft no lob  3. Patient will perform stand b le exs x 20 reps                     History:     Past Medical History:   Diagnosis Date    Anemia     Cholelithiasis     CVA (cerebral vascular accident)     Diverticulitis     Glaucoma     HIV disease     HLD (hyperlipidemia)     HTN (hypertension)        Past Surgical History:   Procedure Laterality Date    NONE         Clinical Decision Making:     History  Co-morbidities and personal factors that may impact the plan of care Examination  Body Structures and Functions, activity limitations and participation restrictions that may impact the plan of care Clinical Presentation   Decision Making/ Complexity Score   Co-morbidities:   [] Time since onset of injury / illness / exacerbation  [] Status of current condition  []Patient's cognitive status and safety concerns    [] Multiple Medical Problems (see med hx)  Personal Factors:   [] Patient's age  [] Prior Level of function   [] Patient's home situation (environment and family support)  [] Patient's level of motivation  [] Expected progression of patient      HISTORY:(criteria)    [] 89458 - no personal factors/history    [] 34477 - has 1-2 personal factor/comorbidity     [] 70668 - has >3 personal factor/comorbidity     Body Regions:  [] Objective examination findings  [] Head     []   Neck  [] Trunk   [] Upper Extremity  [] Lower Extremity    Body Systems:  [] For communication ability, affect, cognition, language, and learning style: the assessment of the ability to make needs known, consciousness, orientation (person, place, and time), expected emotional /behavioral responses, and learning preferences (eg, learning barriers, education  needs)  [] For the neuromuscular system: a general assessment of gross coordinated movement (eg, balance, gait, locomotion, transfers, and transitions) and motor function  (motor control and motor learning)  [] For the musculoskeletal system: the assessment of gross symmetry, gross range of motion, gross strength, height, and weight  [] For the integumentary system: the assessment of pliability(texture), presence of scar formation, skin color, and skin integrity  [] For cardiovascular/pulmonary system: the assessment of heart rate, respiratory rate, blood pressure, and edema     Activity limitations:    [] Patient's cognitive status and saf ety concerns          [] Status of current condition      [] Weight bearing restriction  [] Cardiopulmunary Restriction    Participation Restrictions:   [] Goals and goal agreement with the patient     [] Rehab potential (prognosis) and probable outcome      Examination of Body System: (criteria)    [] 29057 - addressing 1-2 elements    [] 95525 - addressing a total of 3 or more elements     [] 84302 -  Addressing a total of 4 or more elements         Clinical Presentation: (criteria)  Choose one     On examination of body system using standardized tests and measures patient presents with (CHOOSE ONE) elements from any of the following: body structures and functions, activity limitations, and/or participation restrictions.  Leading to a clinical presentation that is considered (CHOOSE ONE)                              Clinical Decision Making  (Eval Complexity):  Choose One     Time Tracking:     PT Received On:    PT Start  Time: 0810     PT Stop Time: 0835  PT Total Time (min): 25 min     Billable Minutes: Evaluation 15 and Therapeutic Activity 10      Tushar Landaverde, PT  07/09/2018

## 2018-07-09 NOTE — PT/OT/SLP PROGRESS
Physical Therapy  Treatment    Lizz Noguera   MRN: [id number]   Admitting Diagnosis: Diverticulitis large intestine w/o perforation or abscess w/o bleeding    PT Received On: 07/09/18  PT Start Time: 1000     PT Stop Time: 1025    PT Total Time (min): 25 min       Billable Minutes:  Gait Training 10 and Therapeutic Activity 15    Treatment Type: Treatment  PT/PTA: PT             General Precautions: Standard, fall  Orthopedic Precautions: N/A   Braces: N/A         Subjective:  Communicated with NURSE MARIO AND EPIC CHART REVIEW prior to session. PT WAS SUP IN BED UPON ENTRY. PT REQUIRED MAX ENCOURAGEMENT TO PARTICIPATE IN THERAPY.     Pain/Comfort  Pain Rating 1: 0/10    Objective:   Patient found with: peripheral IV      Therapeutic Activities and Exercises:   BED MOB WITH SPV FOR SUP>SIT. TF SIT>STAND WITH SPV. GT 70' X2 WITH NO AD AND CGA/EMMA, SLOW PACED AND QUICK TO FATIGUE. RIGHT FOOT DRAG, PT REPORTED THIS WAS HER PLOF DUE TO PRIOR STROKE. PT DENIED PARTICIPATION IN BLE THEREX.     AM-PAC 6 CLICK MOBILITY  How much help from another person does this patient currently need?   1 = Unable, Total/Dependent Assistance  2 = A lot, Maximum/Moderate Assistance  3 = A little, Minimum/Contact Guard/Supervision  4 = None, Modified [place]/Independent    Turning over in bed (including adjusting bedclothes, sheets and blankets)?: 4  Sitting down on and standing up from a chair with arms (e.g., wheelchair, bedside commode, etc.): 4  Moving from lying on back to sitting on the side of the bed?: 4  Moving to and from a bed to a chair (including a wheelchair)?: 4  Need to walk in hospital room?: 3  Climbing 3-5 steps with a railing?: 1  Basic Mobility Total Score: 20    AM-PAC Raw Score CMS G-Code Modifier Level of Impairment Assistance   6 % Total / Unable   7 - 9 CM 80 - 100% Maximal Assist   10 - 14 CL 60 - 80% Moderate Assist   15 - 19 CK 40 - 60% Moderate Assist   20 - 22 CJ 20 - 40% Minimal Assist   23 CI  1-20% SBA / CGA   24 CH 0% Independent/ Mod I     Patient left up in chair with all lines intact, call button in reach, NURSE  notified and  present.    Assessment:  Lizz Noguera is a 57 y.o. female with a medical diagnosis of Diverticulitis large intestine w/o perforation or abscess w/o bleeding and presents with IMPAIRED FUNCTIONAL MOBILITY. PT WILL BENEFIT FROM CONT SKILLED P.T. SERVICES TO ADDRESS LIMITATIONS.    Rehab identified problem list/impairments: Rehab identified problem list/impairments: weakness, gait instability, impaired endurance, decreased safety awareness, impaired self care skills, impaired functional mobilty    Rehab potential is good.    Activity tolerance: Fair    Discharge recommendations: Discharge Facility/Level Of Care Needs: home, home health PT     Equipment recommendations: Equipment Needed After Discharge: none     GOALS:    Physical Therapy Goals        Problem: Physical Therapy Goal    Goal Priority Disciplines Outcome Goal Variances Interventions   Physical Therapy Goal     PT/OT, PT Ongoing (interventions implemented as appropriate)     Description:  ltg's for PT by 7/15/18  1. Patient will perform sit to stand indep  2. Patient will amb mod indep x 300ft no lob  3. Patient will perform stand b le exs x 20 reps                     PLAN:    Patient to be seen 5 x/week  to address the above listed problems via gait training, therapeutic activities, therapeutic exercises  Plan of Care expires: 07/15/18  Plan of Care reviewed with: patient    PT WAS ADVISED TO CALL FOR ASSISTANCE FOR ALL NEEDS DUE TO FALL RISK PRECAUTIONS. PT WAS AGREEABLE.     Nik Deluna, SPT  07/09/2018

## 2018-07-09 NOTE — ASSESSMENT & PLAN NOTE
Admit to Inpatient   -IV Flagyl and Cipro  -IV fluids  -Vitals q 4 hours  -CT scan: diverticulitis without abscess  -IV Ofirmiv: pain control and fever    7/8/2018- Continue IV Flagyl and Cipro, clear liquid diet and IVF, pain and fever control.     7/9/2018- No complaints of abdominal pain or constipation, however not tolerating diet. Will resume full liquid diet and monitor for now.

## 2018-07-09 NOTE — SUBJECTIVE & OBJECTIVE
Interval History: Patient seen and examined. No distress. Constipation resolved. No complaints of abdominal pain, however not tolerating regular diet. Will monitor.     Review of Systems   Constitutional: Positive for appetite change (decrease ). Negative for chills and fever.   HENT: Negative for congestion, rhinorrhea and sinus pressure.    Respiratory: Negative for apnea, cough, choking, chest tightness, shortness of breath, wheezing and stridor.    Cardiovascular: Negative for chest pain, palpitations and leg swelling.   Gastrointestinal: Negative for abdominal distention, diarrhea, nausea and vomiting.   Endocrine: Negative for cold intolerance and heat intolerance.   Genitourinary: Negative for difficulty urinating and hematuria.   Musculoskeletal: Negative for arthralgias and joint swelling.   Skin: Negative for color change, pallor and rash.   Neurological: Negative for dizziness, seizures, weakness, numbness and headaches.   Psychiatric/Behavioral: Negative for agitation. The patient is not nervous/anxious.      Objective:     Vital Signs (Most Recent):  Temp: 99.2 °F (37.3 °C) (07/09/18 1205)  Pulse: 104 (07/09/18 1205)  Resp: 16 (07/09/18 1205)  BP: 133/66 (07/09/18 1205)  SpO2: 96 % (07/09/18 1205) Vital Signs (24h Range):  Temp:  [98.7 °F (37.1 °C)-99.7 °F (37.6 °C)] 99.2 °F (37.3 °C)  Pulse:  [] 104  Resp:  [16-20] 16  SpO2:  [96 %-98 %] 96 %  BP: (121-133)/(60-73) 133/66     Weight: 60.2 kg (132 lb 11.5 oz)  Body mass index is 22.09 kg/m².    Intake/Output Summary (Last 24 hours) at 07/09/18 1448  Last data filed at 07/09/18 1200   Gross per 24 hour   Intake          4933.33 ml   Output                0 ml   Net          4933.33 ml      Physical Exam   Constitutional: No distress.   HENT:   Head: Normocephalic and atraumatic.   Eyes: Right eye exhibits no discharge. Left eye exhibits no discharge.   Cardiovascular: Exam reveals no gallop and no friction rub.    No murmur heard.  Pulmonary/Chest:  No respiratory distress. She has no wheezes. She has no rales. She exhibits no tenderness.   Abdominal: Bowel sounds are normal. She exhibits no distension and no mass. There is tenderness. There is no rebound and no guarding. No hernia.   Musculoskeletal: She exhibits no edema or deformity.   Neurological: She is alert.   Skin: Skin is warm and dry. Capillary refill takes less than 2 seconds. She is not diaphoretic.   Nursing note and vitals reviewed.      Significant Labs:   CBC:   Recent Labs  Lab 07/08/18 0424 07/09/18 0445   WBC 8.21 7.41   HGB 10.4* 9.9*   HCT 32.1* 29.7*    133*     CMP:   Recent Labs  Lab 07/08/18 0424 07/09/18 0445    136   K 3.7 3.2*    107   CO2 18* 20*   GLU 97 112*   BUN 11 10   CREATININE 0.7 0.7   CALCIUM 9.1 9.2   PROT 6.3 5.9*   ALBUMIN 2.9* 2.5*   BILITOT 2.1* 1.8*   ALKPHOS 107 95   AST 27 29   ALT 13 12   ANIONGAP 10 9   EGFRNONAA >60 >60       Significant Imaging:     Imaging Results          CT Abdomen Pelvis With Contrast (Final result)  Result time 07/07/18 11:30:11    Final result by Arpan Del Castillo MD (07/07/18 11:30:11)                 Impression:      1.  Significant thickening of the mid sigmoid colon associated with diverticula, with surrounding edema.  Associated ascites in the cul-de-sac and around the liver.  Findings are concerning for uncomplicated diverticulitis of the sigmoid colon.  Negative for free air or focal drainable fluid collection.    2. Negative for acute process involving the abdomen or pelvis otherwise.  Normal appendix.  Negative for renal stone disease or hydronephrosis.    3.  Moderate to severe multifactorial spinal canal stenosis at L4/L5 and L5/S1.  Clinical correlation is advised.    4.  Avascular necrosis changes to the left greater than right hip joints.  Clinical correlation is advised.    5.  Cholelithiasis.  Small fat filled umbilical hernia.  Other nonemergent findings as described above.    All CT scans at this  facility used dose modulation, iterative reconstruction, and/or weight based dosing when appropriate to reduce radiation dose to as low as reasonably achievable.      Electronically signed by: Arpan Del Castillo MD  Date:    07/07/2018  Time:    11:30             Narrative:    EXAMINATION:  CT ABDOMEN PELVIS WITH CONTRAST    CLINICAL HISTORY:  Lower abdominal pain;    TECHNIQUE:  Axial images through the abdomen and pelvis were obtained with the use of IV contrast.  Sagittal and coronal reconstructions are provided for review.  Oral contrast was not utilized.    COMPARISON:  None    FINDINGS:  No comparison studies are available.    LUNG BASES:   Lung bases are clear.  Negative for pleural or pericardial effusions. The distal esophagus is normal.    ABDOMEN: Gallstones.  The liver, spleen and gallbladder otherwise appear normal.  The pancreas is unremarkable.  Kidneys and adrenal glands are normal.    Negative for adenopathy noted within the abdomen or pelvis.  Vascular calcifications are present without aneurysmal changes. Portal vein is patent.    The stomach and small bowel appear normal.  There is significant edema within sigmoid colon with associated diverticula.  With moderate surrounding edema.  Mild to moderate ascites in the cul-de-sac and mild ascites around the liver.  The rest of the colon appears normal.  A normal appendix.    PELVIS: The urinary bladder is unremarkable.    The female pelvic organs are normal. There are pelvic phleboliths.    No significant osseous abnormality is identified.  Broad-based disc bulge at L4/L5 and L5/S1 with moderate to severe multifactorial spinal canal stenosis.  There are significant degenerative changes a changes of the femoral heads.  Osteopenia.    Negative for groin adenopathy.    Tiny fat filled umbilical hernia.  The abdominal wall is otherwise intact.                               X-Ray Chest AP Portable (Final result)  Result time 07/07/18 10:18:44    Final result by  Arpan Del Castillo MD (07/07/18 10:18:44)                 Impression:      1.  Negative for acute process involving the chest, considering there are lower lung volumes on today's study.    2.  Stable findings as noted above.      Electronically signed by: Arpan Del Castillo MD  Date:    07/07/2018  Time:    10:18             Narrative:    EXAMINATION:  XR CHEST AP PORTABLE    CLINICAL HISTORY:  Sepsis;    COMPARISON:  2 June 2014    FINDINGS:  EKG leads overlie the chest.  Mildly low lung volumes.  The lungs are otherwise clear.  The cardiac silhouette size is normal. The trachea is midline and the mediastinal width is normal. Negative for focal infiltrate, effusion or pneumothorax. Pulmonary vasculature is normal. Negative for osseous abnormalities. Tortuous aorta.  Convex left curvature of the lower thoracic spine.  Stable calcifications within the central portions of both humeri, possibly bone infarctions.

## 2018-07-09 NOTE — PROGRESS NOTES
[facility] Hospital Medicine  Progress Note    Patient Name: Lizz Noguera  MRN: [id number]  Patient Class: IP- Inpatient   Admission Date: 7/7/2018  Length of Stay: 2 days  Attending Physician: Noble Neal MD  Primary Care Provider: Joseluis Doe MD (Inactive)        Subjective:     Principal Problem:Diverticulitis large intestine w/o perforation or abscess w/o bleeding    HPI:  Lizz Noguera is a 57 y.o. female patient with a PMHx of HIV, CVA with right sided residual weakness, HLP, depression, who presented to the ER for suprapubic abd pain which onset suddenly this AM. Associated sxs included constipation. Patient denies any n/v/d, hematochezia, dysuria, hematuria, difficulty urinating, frequency, fever, chills, and all other sxs at this time. Pt denies any history of abdominal surgeries. She still has her appendix. CT scan showed uncomplicated diverticulitis of the sigmoid without abscess. Blood cultures pending. In ER, Temp 103.7, glucose 148, lactic acid 2.5. She is admitted with diverticulitis on IV flagyl and IV cipro.     Hospital Course:  Ms Noguera is a 57 year old female with PMHx HIV, CVA, HLP who presented to complaints of lower abdominal pain. Associated symptoms include constipation. CT scan of the abdomen showed uncomplicated diverticulitis of the sigmoid without abscess. She is currently on Cipro IV and Flagyl IV. She received Senna-docusate and Miralax on yesterday with no BM results. She continues to complain of constipation. Will give mag citrate X 1 and monitor.    7/9/2018- Patient seen and examined today. She is not having abdomen pain and is not constipation. Patient not able to eat regular diet this afternoon. Vital signs stable. Continuing Cipro IV and Flagyl IV.     Interval History: Patient seen and examined. No distress. Constipation resolved. No complaints of abdominal pain, however not tolerating regular diet. Will monitor.     Review of Systems    Constitutional: Positive for appetite change (decrease ). Negative for chills and fever.   HENT: Negative for congestion, rhinorrhea and sinus pressure.    Respiratory: Negative for apnea, cough, choking, chest tightness, shortness of breath, wheezing and stridor.    Cardiovascular: Negative for chest pain, palpitations and leg swelling.   Gastrointestinal: Negative for abdominal distention, diarrhea, nausea and vomiting.   Endocrine: Negative for cold intolerance and heat intolerance.   Genitourinary: Negative for difficulty urinating and hematuria.   Musculoskeletal: Negative for arthralgias and joint swelling.   Skin: Negative for color change, pallor and rash.   Neurological: Negative for dizziness, seizures, weakness, numbness and headaches.   Psychiatric/Behavioral: Negative for agitation. The patient is not nervous/anxious.      Objective:     Vital Signs (Most Recent):  Temp: 99.2 °F (37.3 °C) (07/09/18 1205)  Pulse: 104 (07/09/18 1205)  Resp: 16 (07/09/18 1205)  BP: 133/66 (07/09/18 1205)  SpO2: 96 % (07/09/18 1205) Vital Signs (24h Range):  Temp:  [98.7 °F (37.1 °C)-99.7 °F (37.6 °C)] 99.2 °F (37.3 °C)  Pulse:  [] 104  Resp:  [16-20] 16  SpO2:  [96 %-98 %] 96 %  BP: (121-133)/(60-73) 133/66     Weight: 60.2 kg (132 lb 11.5 oz)  Body mass index is 22.09 kg/m².    Intake/Output Summary (Last 24 hours) at 07/09/18 1448  Last data filed at 07/09/18 1200   Gross per 24 hour   Intake          4933.33 ml   Output                0 ml   Net          4933.33 ml      Physical Exam   Constitutional: No distress.   HENT:   Head: Normocephalic and atraumatic.   Eyes: Right eye exhibits no discharge. Left eye exhibits no discharge.   Cardiovascular: Exam reveals no gallop and no friction rub.    No murmur heard.  Pulmonary/Chest: No respiratory distress. She has no wheezes. She has no rales. She exhibits no tenderness.   Abdominal: Bowel sounds are normal. She exhibits no distension and no mass. There is  tenderness. There is no rebound and no guarding. No hernia.   Musculoskeletal: She exhibits no edema or deformity.   Neurological: She is alert.   Skin: Skin is warm and dry. Capillary refill takes less than 2 seconds. She is not diaphoretic.   Nursing note and vitals reviewed.      Significant Labs:   CBC:   Recent Labs  Lab 07/08/18 0424 07/09/18 0445   WBC 8.21 7.41   HGB 10.4* 9.9*   HCT 32.1* 29.7*    133*     CMP:   Recent Labs  Lab 07/08/18 0424 07/09/18  0445    136   K 3.7 3.2*    107   CO2 18* 20*   GLU 97 112*   BUN 11 10   CREATININE 0.7 0.7   CALCIUM 9.1 9.2   PROT 6.3 5.9*   ALBUMIN 2.9* 2.5*   BILITOT 2.1* 1.8*   ALKPHOS 107 95   AST 27 29   ALT 13 12   ANIONGAP 10 9   EGFRNONAA >60 >60       Significant Imaging:     Imaging Results          CT Abdomen Pelvis With Contrast (Final result)  Result time 07/07/18 11:30:11    Final result by Arpan Del Castillo MD (07/07/18 11:30:11)                 Impression:      1.  Significant thickening of the mid sigmoid colon associated with diverticula, with surrounding edema.  Associated ascites in the cul-de-sac and around the liver.  Findings are concerning for uncomplicated diverticulitis of the sigmoid colon.  Negative for free air or focal drainable fluid collection.    2. Negative for acute process involving the abdomen or pelvis otherwise.  Normal appendix.  Negative for renal stone disease or hydronephrosis.    3.  Moderate to severe multifactorial spinal canal stenosis at L4/L5 and L5/S1.  Clinical correlation is advised.    4.  Avascular necrosis changes to the left greater than right hip joints.  Clinical correlation is advised.    5.  Cholelithiasis.  Small fat filled umbilical hernia.  Other nonemergent findings as described above.    All CT scans at this facility used dose modulation, iterative reconstruction, and/or weight based dosing when appropriate to reduce radiation dose to as low as reasonably  achievable.      Electronically signed by: Arpan Del Castillo MD  Date:    07/07/2018  Time:    11:30             Narrative:    EXAMINATION:  CT ABDOMEN PELVIS WITH CONTRAST    CLINICAL HISTORY:  Lower abdominal pain;    TECHNIQUE:  Axial images through the abdomen and pelvis were obtained with the use of IV contrast.  Sagittal and coronal reconstructions are provided for review.  Oral contrast was not utilized.    COMPARISON:  None    FINDINGS:  No comparison studies are available.    LUNG BASES:   Lung bases are clear.  Negative for pleural or pericardial effusions. The distal esophagus is normal.    ABDOMEN: Gallstones.  The liver, spleen and gallbladder otherwise appear normal.  The pancreas is unremarkable.  Kidneys and adrenal glands are normal.    Negative for adenopathy noted within the abdomen or pelvis.  Vascular calcifications are present without aneurysmal changes. Portal vein is patent.    The stomach and small bowel appear normal.  There is significant edema within sigmoid colon with associated diverticula.  With moderate surrounding edema.  Mild to moderate ascites in the cul-de-sac and mild ascites around the liver.  The rest of the colon appears normal.  A normal appendix.    PELVIS: The urinary bladder is unremarkable.    The female pelvic organs are normal. There are pelvic phleboliths.    No significant osseous abnormality is identified.  Broad-based disc bulge at L4/L5 and L5/S1 with moderate to severe multifactorial spinal canal stenosis.  There are significant degenerative changes a changes of the femoral heads.  Osteopenia.    Negative for groin adenopathy.    Tiny fat filled umbilical hernia.  The abdominal wall is otherwise intact.                               X-Ray Chest AP Portable (Final result)  Result time 07/07/18 10:18:44    Final result by Arpan Del Castillo MD (07/07/18 10:18:44)                 Impression:      1.  Negative for acute process involving the chest, considering there are  lower lung volumes on today's study.    2.  Stable findings as noted above.      Electronically signed by: Arpan Del Castillo MD  Date:    07/07/2018  Time:    10:18             Narrative:    EXAMINATION:  XR CHEST AP PORTABLE    CLINICAL HISTORY:  Sepsis;    COMPARISON:  2 June 2014    FINDINGS:  EKG leads overlie the chest.  Mildly low lung volumes.  The lungs are otherwise clear.  The cardiac silhouette size is normal. The trachea is midline and the mediastinal width is normal. Negative for focal infiltrate, effusion or pneumothorax. Pulmonary vasculature is normal. Negative for osseous abnormalities. Tortuous aorta.  Convex left curvature of the lower thoracic spine.  Stable calcifications within the central portions of both humeri, possibly bone infarctions.                              Assessment/Plan:      * Diverticulitis large intestine w/o perforation or abscess w/o bleeding    Admit to Inpatient   -IV Flagyl and Cipro  -IV fluids  -Vitals q 4 hours  -CT scan: diverticulitis without abscess  -IV Ofirmiv: pain control and fever    7/8/2018- Continue IV Flagyl and Cipro, clear liquid diet and IVF, pain and fever control.     7/9/2018- No complaints of abdominal pain or constipation, however not tolerating diet. Will resume full liquid diet and monitor for now.         HIV (human immunodeficiency virus infection)    Resume home meds          Tobacco abuse    Tobacco abuse  -smoking cessation counseling  -nicotine patch            Slow transit constipation    - miralax daily   -senokot-S - 2 tabs daily.      7/8/2018- Magnesium Citrate X 1    7/9/2018- Resolved.           Lactic acidosis    Lactic acid 2.5 on admit and repeat 1.7              VTE Risk Mitigation         Ordered     IP VTE LOW RISK PATIENT  Once      07/07/18 1357     Place sequential compression device  Until discontinued      07/07/18 1357              Daryl Bunn NP  Department of Hospital Medicine   [facility]

## 2018-07-09 NOTE — PT/OT/SLP EVAL
Occupational Therapy   Evaluation and Discharge Note    Name: Lizz Noguera  MRN: [id number]  Admitting Diagnosis:  Diverticulitis large intestine w/o perforation or abscess w/o bleeding      Recommendations:     Discharge Recommendations: home, home health OT  Discharge Equipment Recommendations:  none  Barriers to discharge:  None    History:     Occupational Profile:  Living Environment: lives with spouse in 1 story house no steps  Previous level of function: (i) with adl's and functional mobility  Roles and Routines: occupational therapy  Equipment Owned:  none  Assistance upon Discharge:     Past Medical History:   Diagnosis Date    Anemia     Cholelithiasis     CVA (cerebral vascular accident)     Diverticulitis     Glaucoma     HIV disease     HLD (hyperlipidemia)     HTN (hypertension)        Past Surgical History:   Procedure Laterality Date    NONE         Subjective     Chief Complaint:   Patient/Family stated goals:   Communicated with: nurse and epic chart review prior to session.  Pain/Comfort:  · Pain Rating 1: 0/10    Patients cultural, spiritual, Restoration conflicts given the current situation:      Objective:     Patient found with: peripheral IV    General Precautions: Standard, fall   Orthopedic Precautions:N/A   Braces: N/A     Occupational Performance:    Bed Mobility:    · Patient completed Rolling/Turning to Left with  supervision  · Patient completed Rolling/Turning to Right with supervision  · Patient completed Scooting/Bridging with supervision  · Patient completed Supine to Sit with supervision  · Patient completed Sit to Supine with supervision    Functional Mobility/Transfers:  · Patient completed Sit <> Stand Transfer with supervision  with  no assistive device   · Functional Mobility: pt req sba with functional mobility with no ae    Activities of Daily Living:  · Upper Body Dressing: stand by assistance x1  · Lower Body Dressing: stand by assistance x1    Cognitive/Visual  "Perceptual:  Cognitive/Psychosocial Skills:     -       Oriented to: Person, Place, Time and Situation   -       Follows Commands/attention:Follows multistep  commands  -       Communication: clear/fluent  -       Memory: No Deficits noted  -       Safety awareness/insight to disability: intact   Visual/Perceptual:      -Intact    Physical Exam:  Upper Extremity Range of Motion:     -       Right Upper Extremity: WFL  -       Left Upper Extremity: WFL  Upper Extremity Strength:    -       Right Upper Extremity: mmt: 4/5 grossly  -       Left Upper Extremity: mmt: 4/5 grossly   Strength:    -       Right Upper Extremity: mmt: 4/5 grossly  -       Left Upper Extremity: mmt: 4/5 grossly    Patient left HOB elevated with all lines intact, call button in reach, nurse notified and sister present    [facility] 6 Click:  [facility] Total Score: 24    Treatment & Education:    Education:    Assessment:     Lizz Noguera is a 57 y.o. female with a medical diagnosis of Diverticulitis large intestine w/o perforation or abscess w/o bleeding. At this time, patient is functioning at their prior level of function and does not require further acute OT services.     Clinical Decision Makin.  OT Low:  "Pt evaluation falls under low complexity for evaluation coding due to performance deficits noted in 1-3 areas as stated above and 0 co-morbities affecting current functional status. Data obtained from problem focused assessments. No modifications or assistance was required for completion of evaluation. Only brief occupational profile and history review completed."     Plan:     During this hospitalization, patient does not require further acute OT services.  Please re-consult if situation changes.    · Plan of Care Reviewed with: patient    This Plan of care has been discussed with the patient who was involved in its development and understands and is in agreement with the identified goals and treatment plan    GOALS:    Occupational " Therapy Goals     Not on file                Time Tracking:     OT Date of Treatment: 07/09/18  OT Start Time: 1425  OT Stop Time: 1450  OT Total Time (min): 25 min    Billable Minutes:Evaluation 10 minutes  Therapeutic Activity 15 minutes    Ania Mcdonald OT  7/9/2018

## 2018-07-09 NOTE — PLAN OF CARE
Problem: Patient Care Overview  Goal: Plan of Care Review  Outcome: Ongoing (interventions implemented as appropriate)  Fall prevention precautions maintained, pt remained free of falls throughout shift, call bell and personal items within reach, pt had small bowel movement during shift. 24 hour chart check completed. Will continue to monitor

## 2018-07-10 PROBLEM — E87.6 HYPOKALEMIA: Status: ACTIVE | Noted: 2018-07-10

## 2018-07-10 PROBLEM — K59.01 SLOW TRANSIT CONSTIPATION: Status: RESOLVED | Noted: 2018-07-07 | Resolved: 2018-07-10

## 2018-07-10 PROBLEM — R78.81 GRAM-NEGATIVE BACTEREMIA: Status: ACTIVE | Noted: 2018-07-10

## 2018-07-10 PROBLEM — E87.20 LACTIC ACIDOSIS: Status: RESOLVED | Noted: 2018-07-07 | Resolved: 2018-07-10

## 2018-07-10 LAB
ALBUMIN SERPL BCP-MCNC: 2.4 G/DL
ALP SERPL-CCNC: 126 U/L
ALT SERPL W/O P-5'-P-CCNC: 13 U/L
ANION GAP SERPL CALC-SCNC: 7 MMOL/L
AST SERPL-CCNC: 23 U/L
BACTERIA BLD CULT: NORMAL
BASOPHILS # BLD AUTO: 0.01 K/UL
BASOPHILS NFR BLD: 0.1 %
BILIRUB SERPL-MCNC: 1.8 MG/DL
BUN SERPL-MCNC: 9 MG/DL
CALCIUM SERPL-MCNC: 9.1 MG/DL
CHLORIDE SERPL-SCNC: 107 MMOL/L
CO2 SERPL-SCNC: 20 MMOL/L
CREAT SERPL-MCNC: 0.7 MG/DL
DIFFERENTIAL METHOD: ABNORMAL
EOSINOPHIL # BLD AUTO: 0 K/UL
EOSINOPHIL NFR BLD: 0.2 %
ERYTHROCYTE [DISTWIDTH] IN BLOOD BY AUTOMATED COUNT: 12.6 %
EST. GFR  (AFRICAN AMERICAN): >60 ML/MIN/1.73 M^2
EST. GFR  (NON AFRICAN AMERICAN): >60 ML/MIN/1.73 M^2
GLUCOSE SERPL-MCNC: 125 MG/DL
HCT VFR BLD AUTO: 28 %
HGB BLD-MCNC: 9.5 G/DL
LYMPHOCYTES # BLD AUTO: 1.2 K/UL
LYMPHOCYTES NFR BLD: 12.4 %
MAGNESIUM SERPL-MCNC: 2.1 MG/DL
MCH RBC QN AUTO: 31.7 PG
MCHC RBC AUTO-ENTMCNC: 33.9 G/DL
MCV RBC AUTO: 93 FL
MONOCYTES # BLD AUTO: 0.5 K/UL
MONOCYTES NFR BLD: 5 %
NEUTROPHILS # BLD AUTO: 8.1 K/UL
NEUTROPHILS NFR BLD: 82.3 %
PLATELET # BLD AUTO: 155 K/UL
PMV BLD AUTO: 10.3 FL
POTASSIUM SERPL-SCNC: 3.2 MMOL/L
PROT SERPL-MCNC: 5.8 G/DL
RBC # BLD AUTO: 3 M/UL
SODIUM SERPL-SCNC: 134 MMOL/L
WBC # BLD AUTO: 9.89 K/UL

## 2018-07-10 PROCEDURE — 80053 COMPREHEN METABOLIC PANEL: CPT

## 2018-07-10 PROCEDURE — 63600175 PHARM REV CODE 636 W HCPCS: Performed by: NURSE PRACTITIONER

## 2018-07-10 PROCEDURE — 85025 COMPLETE CBC W/AUTO DIFF WBC: CPT

## 2018-07-10 PROCEDURE — 25500020 PHARM REV CODE 255: Performed by: INTERNAL MEDICINE

## 2018-07-10 PROCEDURE — 36415 COLL VENOUS BLD VENIPUNCTURE: CPT

## 2018-07-10 PROCEDURE — 97116 GAIT TRAINING THERAPY: CPT

## 2018-07-10 PROCEDURE — 25000003 PHARM REV CODE 250: Performed by: NURSE PRACTITIONER

## 2018-07-10 PROCEDURE — 87040 BLOOD CULTURE FOR BACTERIA: CPT

## 2018-07-10 PROCEDURE — 25000003 PHARM REV CODE 250: Performed by: EMERGENCY MEDICINE

## 2018-07-10 PROCEDURE — S0030 INJECTION, METRONIDAZOLE: HCPCS | Performed by: EMERGENCY MEDICINE

## 2018-07-10 PROCEDURE — 83735 ASSAY OF MAGNESIUM: CPT

## 2018-07-10 PROCEDURE — 11000001 HC ACUTE MED/SURG PRIVATE ROOM

## 2018-07-10 PROCEDURE — 63600175 PHARM REV CODE 636 W HCPCS: Performed by: EMERGENCY MEDICINE

## 2018-07-10 RX ORDER — POTASSIUM CHLORIDE 20 MEQ/1
40 TABLET, EXTENDED RELEASE ORAL ONCE
Status: COMPLETED | OUTPATIENT
Start: 2018-07-10 | End: 2018-07-10

## 2018-07-10 RX ADMIN — ROSUVASTATIN CALCIUM 20 MG: 10 TABLET, FILM COATED ORAL at 09:07

## 2018-07-10 RX ADMIN — CYPROHEPTADINE HYDROCHLORIDE 4 MG: 4 TABLET ORAL at 09:07

## 2018-07-10 RX ADMIN — CIPROFLOXACIN 400 MG: 2 INJECTION, SOLUTION INTRAVENOUS at 03:07

## 2018-07-10 RX ADMIN — CYPROHEPTADINE HYDROCHLORIDE 4 MG: 4 TABLET ORAL at 04:07

## 2018-07-10 RX ADMIN — BUPROPION HYDROCHLORIDE 150 MG: 150 TABLET, FILM COATED, EXTENDED RELEASE ORAL at 08:07

## 2018-07-10 RX ADMIN — IBUPROFEN 400 MG: 400 TABLET, FILM COATED ORAL at 01:07

## 2018-07-10 RX ADMIN — POTASSIUM CHLORIDE 40 MEQ: 1500 TABLET, EXTENDED RELEASE ORAL at 09:07

## 2018-07-10 RX ADMIN — IOHEXOL 30 ML: 350 INJECTION, SOLUTION INTRAVENOUS at 09:07

## 2018-07-10 RX ADMIN — FERROUS GLUCONATE TAB 324 MG (37.5 MG ELEMENTAL IRON) 324 MG: 324 (37.5 FE) TAB at 09:07

## 2018-07-10 RX ADMIN — CYPROHEPTADINE HYDROCHLORIDE 4 MG: 4 TABLET ORAL at 08:07

## 2018-07-10 RX ADMIN — METRONIDAZOLE 500 MG: 500 INJECTION, SOLUTION INTRAVENOUS at 05:07

## 2018-07-10 RX ADMIN — STANDARDIZED SENNA CONCENTRATE AND DOCUSATE SODIUM 2 TABLET: 8.6; 5 TABLET, FILM COATED ORAL at 08:07

## 2018-07-10 RX ADMIN — PIPERACILLIN SODIUM AND TAZOBACTAM SODIUM 4.5 G: 4; .5 INJECTION, POWDER, LYOPHILIZED, FOR SOLUTION INTRAVENOUS at 06:07

## 2018-07-10 RX ADMIN — POLYETHYLENE GLYCOL (3350) 17 G: 17 POWDER, FOR SOLUTION ORAL at 09:07

## 2018-07-10 RX ADMIN — SODIUM CHLORIDE: 0.9 INJECTION, SOLUTION INTRAVENOUS at 08:07

## 2018-07-10 RX ADMIN — IOHEXOL 75 ML: 350 INJECTION, SOLUTION INTRAVENOUS at 11:07

## 2018-07-10 RX ADMIN — METRONIDAZOLE 500 MG: 500 INJECTION, SOLUTION INTRAVENOUS at 09:07

## 2018-07-10 RX ADMIN — BUPROPION HYDROCHLORIDE 150 MG: 150 TABLET, FILM COATED, EXTENDED RELEASE ORAL at 09:07

## 2018-07-10 RX ADMIN — CIPROFLOXACIN 400 MG: 2 INJECTION, SOLUTION INTRAVENOUS at 02:07

## 2018-07-10 RX ADMIN — METRONIDAZOLE 500 MG: 500 INJECTION, SOLUTION INTRAVENOUS at 01:07

## 2018-07-10 NOTE — PLAN OF CARE
Problem: Physical Therapy Goal  Goal: Physical Therapy Goal  ltg's for PT by 7/15/18  1. Patient will perform sit to stand indep  2. Patient will amb mod indep x 300ft no lob  3. Patient will perform stand b le exs x 20 reps    Outcome: Ongoing (interventions implemented as appropriate)  PT GT TRAINED X 250' WITH CGA AND R FOOT DRAG

## 2018-07-10 NOTE — PROGRESS NOTES
[facility] Hospital Medicine  Progress Note    Patient Name: Lizz Noguera  MRN: [id number]  Patient Class: IP- Inpatient    Admission Date: 7/7/2018  Length of Stay: 3 days  Attending Physician: Noble Neal MD  Primary Care Provider: Joseluis Doe MD (Inactive)        Subjective:     Principal Problem:Diverticulitis large intestine w/o perforation or abscess w/o bleeding    HPI:  Lizz Noguera is a 57 y.o. female patient with a PMHx of HIV, CVA with right sided residual weakness, HLP, depression, who presented to the ER for suprapubic abd pain which onset suddenly this AM. Associated sxs included constipation. Patient denies any n/v/d, hematochezia, dysuria, hematuria, difficulty urinating, frequency, fever, chills, and all other sxs at this time. Pt denies any history of abdominal surgeries. She still has her appendix. CT scan showed uncomplicated diverticulitis of the sigmoid without abscess. Blood cultures pending. In ER, Temp 103.7, glucose 148, lactic acid 2.5. She is admitted with diverticulitis on IV flagyl and IV cipro.     Hospital Course:  Ms Noguera is a 57 year old female with PMHx HIV, CVA, HLP who presented to complaints of lower abdominal pain. Associated symptoms include constipation. CT scan of the abdomen showed uncomplicated diverticulitis of the sigmoid without abscess. She is currently on Cipro IV and Flagyl IV. She received Senna-docusate and Miralax on yesterday with no BM results. She continues to complain of constipation. Will give mag citrate X 1 and monitor.    7/9/2018- Patient seen and examined today. She is not having abdomen pain and is not constipation. Patient not able to eat regular diet this afternoon. Vital signs stable. Continuing Cipro IV and Flagyl IV.     07/10/18 -- Fever overnight of 101.4. Blood cultures (+) gram negative rods, BACTEROIDES VULGATUS. Susceptibilities pending. Pt currently receiving IV Cipro and Flagyl. Diet advanced, tolerated without  "abdominal pain, N/V. Pt reports, "my stomach is bigger than normal, I can barely see my belly button." Mild distension noted. Will obtain abdominal US to assess further.    Interval History: Pt seen and examined. Pt tolerated advanced diet without abdominal pain, N/V. Pt c/o increase in size of belly. Will obtain abdominal US for further assessment.    Review of Systems   Constitutional: Positive for activity change and fever.   HENT: Negative.    Eyes: Negative.    Respiratory: Negative for apnea, cough, choking, chest tightness, shortness of breath, wheezing and stridor.    Cardiovascular: Negative for chest pain, palpitations and leg swelling.   Gastrointestinal: Positive for abdominal distention. Negative for abdominal pain, constipation, diarrhea, nausea and vomiting.   Endocrine: Negative.    Genitourinary: Negative.    Musculoskeletal: Negative.    Skin: Negative.    Allergic/Immunologic: Negative.    Neurological: Negative for dizziness, tremors, seizures, syncope, facial asymmetry, speech difficulty, weakness, light-headedness, numbness and headaches.   Hematological: Negative.    Psychiatric/Behavioral: Negative.      Objective:     Vital Signs (Most Recent):  Temp: 98.6 °F (37 °C) (07/10/18 1225)  Pulse: 94 (07/10/18 1225)  Resp: 18 (07/10/18 1225)  BP: (!) 145/63 (07/10/18 1225)  SpO2: 99 % (07/10/18 1225) Vital Signs (24h Range):  Temp:  [98.6 °F (37 °C)-101.4 °F (38.6 °C)] 98.6 °F (37 °C)  Pulse:  [] 94  Resp:  [16-18] 18  SpO2:  [95 %-99 %] 99 %  BP: (120-145)/(60-68) 145/63     Weight: 61.7 kg (136 lb 0.4 oz)  Body mass index is 22.64 kg/m².    Intake/Output Summary (Last 24 hours) at 07/10/18 1312  Last data filed at 07/10/18 0900   Gross per 24 hour   Intake          3996.66 ml   Output                0 ml   Net          3996.66 ml      Physical Exam   Constitutional: She is oriented to person, place, and time. She appears well-developed. She is cooperative. She is easily aroused. No " distress.   HENT:   Head: Normocephalic and atraumatic.   Eyes: EOM are normal.   Neck: Normal range of motion. Neck supple.   Cardiovascular: Normal rate, regular rhythm, normal heart sounds and intact distal pulses.    No murmur heard.  Pulmonary/Chest: Effort normal and breath sounds normal. No respiratory distress. She has no wheezes. She has no rales. She exhibits no tenderness.   Abdominal: Soft. Bowel sounds are normal. She exhibits distension (mild). There is no tenderness. There is no rebound and no guarding.   Genitourinary:   Genitourinary Comments: Deferred   Musculoskeletal: Normal range of motion.   Neurological: She is alert, oriented to person, place, and time and easily aroused. No sensory deficit.   Skin: Skin is warm and dry. Capillary refill takes less than 2 seconds.   Psychiatric: She has a normal mood and affect. Her behavior is normal. Judgment and thought content normal.   Nursing note and vitals reviewed.      Significant Labs:   CBC:   Recent Labs  Lab 07/09/18  0445 07/10/18  0418   WBC 7.41 9.89   HGB 9.9* 9.5*   HCT 29.7* 28.0*   * 155     CMP:   Recent Labs  Lab 07/09/18 0445 07/10/18  0418    134*   K 3.2* 3.2*    107   CO2 20* 20*   * 125*   BUN 10 9   CREATININE 0.7 0.7   CALCIUM 9.2 9.1   PROT 5.9* 5.8*   ALBUMIN 2.5* 2.4*   BILITOT 1.8* 1.8*   ALKPHOS 95 126   AST 29 23   ALT 12 13   ANIONGAP 9 7*   EGFRNONAA >60 >60     Magnesium:   Recent Labs  Lab 07/10/18  0418   MG 2.1       Significant Imaging:   Imaging Results          CT Abdomen Pelvis With Contrast (Final result)  Result time 07/07/18 11:30:11    Final result by Arpan Del Castillo MD (07/07/18 11:30:11)                 Impression:      1.  Significant thickening of the mid sigmoid colon associated with diverticula, with surrounding edema.  Associated ascites in the cul-de-sac and around the liver.  Findings are concerning for uncomplicated diverticulitis of the sigmoid colon.  Negative for free  air or focal drainable fluid collection.    2. Negative for acute process involving the abdomen or pelvis otherwise.  Normal appendix.  Negative for renal stone disease or hydronephrosis.    3.  Moderate to severe multifactorial spinal canal stenosis at L4/L5 and L5/S1.  Clinical correlation is advised.    4.  Avascular necrosis changes to the left greater than right hip joints.  Clinical correlation is advised.    5.  Cholelithiasis.  Small fat filled umbilical hernia.  Other nonemergent findings as described above.    All CT scans at this facility used dose modulation, iterative reconstruction, and/or weight based dosing when appropriate to reduce radiation dose to as low as reasonably achievable.      Electronically signed by: Arpan Del Castillo MD  Date:    07/07/2018  Time:    11:30             Narrative:    EXAMINATION:  CT ABDOMEN PELVIS WITH CONTRAST    CLINICAL HISTORY:  Lower abdominal pain;    TECHNIQUE:  Axial images through the abdomen and pelvis were obtained with the use of IV contrast.  Sagittal and coronal reconstructions are provided for review.  Oral contrast was not utilized.    COMPARISON:  None    FINDINGS:  No comparison studies are available.    LUNG BASES:   Lung bases are clear.  Negative for pleural or pericardial effusions. The distal esophagus is normal.    ABDOMEN: Gallstones.  The liver, spleen and gallbladder otherwise appear normal.  The pancreas is unremarkable.  Kidneys and adrenal glands are normal.    Negative for adenopathy noted within the abdomen or pelvis.  Vascular calcifications are present without aneurysmal changes. Portal vein is patent.    The stomach and small bowel appear normal.  There is significant edema within sigmoid colon with associated diverticula.  With moderate surrounding edema.  Mild to moderate ascites in the cul-de-sac and mild ascites around the liver.  The rest of the colon appears normal.  A normal appendix.    PELVIS: The urinary bladder is unremarkable.     The female pelvic organs are normal. There are pelvic phleboliths.    No significant osseous abnormality is identified.  Broad-based disc bulge at L4/L5 and L5/S1 with moderate to severe multifactorial spinal canal stenosis.  There are significant degenerative changes a changes of the femoral heads.  Osteopenia.    Negative for groin adenopathy.    Tiny fat filled umbilical hernia.  The abdominal wall is otherwise intact.                               X-Ray Chest AP Portable (Final result)  Result time 07/07/18 10:18:44    Final result by Arpan Del Castillo MD (07/07/18 10:18:44)                 Impression:      1.  Negative for acute process involving the chest, considering there are lower lung volumes on today's study.    2.  Stable findings as noted above.      Electronically signed by: Arpan Del Castillo MD  Date:    07/07/2018  Time:    10:18             Narrative:    EXAMINATION:  XR CHEST AP PORTABLE    CLINICAL HISTORY:  Sepsis;    COMPARISON:  2 June 2014    FINDINGS:  EKG leads overlie the chest.  Mildly low lung volumes.  The lungs are otherwise clear.  The cardiac silhouette size is normal. The trachea is midline and the mediastinal width is normal. Negative for focal infiltrate, effusion or pneumothorax. Pulmonary vasculature is normal. Negative for osseous abnormalities. Tortuous aorta.  Convex left curvature of the lower thoracic spine.  Stable calcifications within the central portions of both humeri, possibly bone infarctions.                              Assessment/Plan:      * Diverticulitis large intestine w/o perforation or abscess w/o bleeding    - CT scan: diverticulitis without abscess  - Blood cultures with gram negative bacteremia, BACTEROIDES VULGATUS. Susceptibilities pending  - Continue IV Flagyl and Cipro  - Tolerated advanced diet          Gram-negative bacteremia    - Blood cultures with gram negative bacteremia, BACTEROIDES VULGATUS. Susceptibilities pending  - Continue IV Flagyl and Cipro  "for now          Hypokalemia    - K+ 3.2  - KCL 40 mEq PO x 1  - Will check Mg level and repeat K+ level in 4 hours          HIV (human immunodeficiency virus infection)    - Sees Dr. Doe at [facility] outpatient  - Pt unsure of last CD4 count but was told "it was good."  - Continue home medications          Tobacco abuse    - Smoking cessation counseling  - Nicotine patch              VTE Risk Mitigation         Ordered     IP VTE LOW RISK PATIENT  Once      07/07/18 1357     Place sequential compression device  Until discontinued      07/07/18 1357              ALIS Barton  Department of Hospital Medicine   [facility]  "

## 2018-07-10 NOTE — PLAN OF CARE
Problem: Physical Therapy Goal  Goal: Physical Therapy Goal  ltg's for PT by 7/15/18  1. Patient will perform sit to stand indep  2. Patient will amb mod indep x 300ft no lob  3. Patient will perform stand b le exs x 20 reps    Outcome: Ongoing (interventions implemented as appropriate)  PT SUP>SIT EOB WITH S AND GT TRAINED X 120' WITH CGA R FOOT DRAG

## 2018-07-10 NOTE — SUBJECTIVE & OBJECTIVE
Interval History: Pt seen and examined. Pt tolerated advanced diet without abdominal pain, N/V. Pt c/o increase in size of belly. Will obtain abdominal US for further assessment.    Review of Systems   Constitutional: Positive for activity change and fever.   HENT: Negative.    Eyes: Negative.    Respiratory: Negative for apnea, cough, choking, chest tightness, shortness of breath, wheezing and stridor.    Cardiovascular: Negative for chest pain, palpitations and leg swelling.   Gastrointestinal: Positive for abdominal distention. Negative for abdominal pain, constipation, diarrhea, nausea and vomiting.   Endocrine: Negative.    Genitourinary: Negative.    Musculoskeletal: Negative.    Skin: Negative.    Allergic/Immunologic: Negative.    Neurological: Negative for dizziness, tremors, seizures, syncope, facial asymmetry, speech difficulty, weakness, light-headedness, numbness and headaches.   Hematological: Negative.    Psychiatric/Behavioral: Negative.      Objective:     Vital Signs (Most Recent):  Temp: 98.6 °F (37 °C) (07/10/18 1225)  Pulse: 94 (07/10/18 1225)  Resp: 18 (07/10/18 1225)  BP: (!) 145/63 (07/10/18 1225)  SpO2: 99 % (07/10/18 1225) Vital Signs (24h Range):  Temp:  [98.6 °F (37 °C)-101.4 °F (38.6 °C)] 98.6 °F (37 °C)  Pulse:  [] 94  Resp:  [16-18] 18  SpO2:  [95 %-99 %] 99 %  BP: (120-145)/(60-68) 145/63     Weight: 61.7 kg (136 lb 0.4 oz)  Body mass index is 22.64 kg/m².    Intake/Output Summary (Last 24 hours) at 07/10/18 1312  Last data filed at 07/10/18 0900   Gross per 24 hour   Intake          3996.66 ml   Output                0 ml   Net          3996.66 ml      Physical Exam   Constitutional: She is oriented to person, place, and time. She appears well-developed. She is cooperative. She is easily aroused. No distress.   HENT:   Head: Normocephalic and atraumatic.   Eyes: EOM are normal.   Neck: Normal range of motion. Neck supple.   Cardiovascular: Normal rate, regular rhythm, normal  heart sounds and intact distal pulses.    No murmur heard.  Pulmonary/Chest: Effort normal and breath sounds normal. No respiratory distress. She has no wheezes. She has no rales. She exhibits no tenderness.   Abdominal: Soft. Bowel sounds are normal. She exhibits distension (mild). There is no tenderness. There is no rebound and no guarding.   Genitourinary:   Genitourinary Comments: Deferred   Musculoskeletal: Normal range of motion.   Neurological: She is alert, oriented to person, place, and time and easily aroused. No sensory deficit.   Skin: Skin is warm and dry. Capillary refill takes less than 2 seconds.   Psychiatric: She has a normal mood and affect. Her behavior is normal. Judgment and thought content normal.   Nursing note and vitals reviewed.      Significant Labs:   CBC:   Recent Labs  Lab 07/09/18  0445 07/10/18  0418   WBC 7.41 9.89   HGB 9.9* 9.5*   HCT 29.7* 28.0*   * 155     CMP:   Recent Labs  Lab 07/09/18  0445 07/10/18  0418    134*   K 3.2* 3.2*    107   CO2 20* 20*   * 125*   BUN 10 9   CREATININE 0.7 0.7   CALCIUM 9.2 9.1   PROT 5.9* 5.8*   ALBUMIN 2.5* 2.4*   BILITOT 1.8* 1.8*   ALKPHOS 95 126   AST 29 23   ALT 12 13   ANIONGAP 9 7*   EGFRNONAA >60 >60     Magnesium:   Recent Labs  Lab 07/10/18  0418   MG 2.1       Significant Imaging:   Imaging Results          CT Abdomen Pelvis With Contrast (Final result)  Result time 07/07/18 11:30:11    Final result by Arpna Del Castillo MD (07/07/18 11:30:11)                 Impression:      1.  Significant thickening of the mid sigmoid colon associated with diverticula, with surrounding edema.  Associated ascites in the cul-de-sac and around the liver.  Findings are concerning for uncomplicated diverticulitis of the sigmoid colon.  Negative for free air or focal drainable fluid collection.    2. Negative for acute process involving the abdomen or pelvis otherwise.  Normal appendix.  Negative for renal stone disease or  hydronephrosis.    3.  Moderate to severe multifactorial spinal canal stenosis at L4/L5 and L5/S1.  Clinical correlation is advised.    4.  Avascular necrosis changes to the left greater than right hip joints.  Clinical correlation is advised.    5.  Cholelithiasis.  Small fat filled umbilical hernia.  Other nonemergent findings as described above.    All CT scans at this facility used dose modulation, iterative reconstruction, and/or weight based dosing when appropriate to reduce radiation dose to as low as reasonably achievable.      Electronically signed by: Arpan Del Castillo MD  Date:    07/07/2018  Time:    11:30             Narrative:    EXAMINATION:  CT ABDOMEN PELVIS WITH CONTRAST    CLINICAL HISTORY:  Lower abdominal pain;    TECHNIQUE:  Axial images through the abdomen and pelvis were obtained with the use of IV contrast.  Sagittal and coronal reconstructions are provided for review.  Oral contrast was not utilized.    COMPARISON:  None    FINDINGS:  No comparison studies are available.    LUNG BASES:   Lung bases are clear.  Negative for pleural or pericardial effusions. The distal esophagus is normal.    ABDOMEN: Gallstones.  The liver, spleen and gallbladder otherwise appear normal.  The pancreas is unremarkable.  Kidneys and adrenal glands are normal.    Negative for adenopathy noted within the abdomen or pelvis.  Vascular calcifications are present without aneurysmal changes. Portal vein is patent.    The stomach and small bowel appear normal.  There is significant edema within sigmoid colon with associated diverticula.  With moderate surrounding edema.  Mild to moderate ascites in the cul-de-sac and mild ascites around the liver.  The rest of the colon appears normal.  A normal appendix.    PELVIS: The urinary bladder is unremarkable.    The female pelvic organs are normal. There are pelvic phleboliths.    No significant osseous abnormality is identified.  Broad-based disc bulge at L4/L5 and L5/S1 with  moderate to severe multifactorial spinal canal stenosis.  There are significant degenerative changes a changes of the femoral heads.  Osteopenia.    Negative for groin adenopathy.    Tiny fat filled umbilical hernia.  The abdominal wall is otherwise intact.                               X-Ray Chest AP Portable (Final result)  Result time 07/07/18 10:18:44    Final result by Arpan Del Castillo MD (07/07/18 10:18:44)                 Impression:      1.  Negative for acute process involving the chest, considering there are lower lung volumes on today's study.    2.  Stable findings as noted above.      Electronically signed by: Arpan Del Castillo MD  Date:    07/07/2018  Time:    10:18             Narrative:    EXAMINATION:  XR CHEST AP PORTABLE    CLINICAL HISTORY:  Sepsis;    COMPARISON:  2 June 2014    FINDINGS:  EKG leads overlie the chest.  Mildly low lung volumes.  The lungs are otherwise clear.  The cardiac silhouette size is normal. The trachea is midline and the mediastinal width is normal. Negative for focal infiltrate, effusion or pneumothorax. Pulmonary vasculature is normal. Negative for osseous abnormalities. Tortuous aorta.  Convex left curvature of the lower thoracic spine.  Stable calcifications within the central portions of both humeri, possibly bone infarctions.

## 2018-07-10 NOTE — ASSESSMENT & PLAN NOTE
"- [facility] Dr[facility] at [facility] outpatient  - Pt unsure of last CD4 count but was told "it was good."  - Continue home medications    "

## 2018-07-10 NOTE — PROGRESS NOTES
Patient complains of SOB. VS rechecked; patient stable 98% on RA. Lung sounds clear. Germania Tesfaye NP and Dr. Neal notified and aware. Patient given IS and educated on use, verbalized understanding.

## 2018-07-10 NOTE — PLAN OF CARE
Problem: Patient Care Overview  Goal: Plan of Care Review  Outcome: Ongoing (interventions implemented as appropriate)  Pt experience fever Tmax 101.4 this shift moderately controlled with prn Motrin. Denies any pain or n/v. IVF/abx infused as ordered. Ambulating in halls independently. No bm this shift. Chart reviewed. Will monitor.

## 2018-07-10 NOTE — ASSESSMENT & PLAN NOTE
- Blood cultures with gram negative bacteremia, BACTEROIDES VULGATUS. Susceptibilities pending  - Continue IV Zosyn and Cipro for now

## 2018-07-10 NOTE — PLAN OF CARE
Problem: Patient Care Overview  Goal: Plan of Care Review  Outcome: Ongoing (interventions implemented as appropriate)  Remained free from injury. Tolerating diet. Denies pain. Continuing iv abx. Reports SOB, provider informed. Placed on 2L NC. IS at bedside. Ambulating independently. 12 hour cart check complete.

## 2018-07-10 NOTE — PT/OT/SLP PROGRESS
Physical Therapy  Treatment    Lizz Noguera   MRN: [id number]   Admitting Diagnosis: Diverticulitis large intestine w/o perforation or abscess w/o bleeding    PT Received On: 07/10/18  PT Start Time: 1141     PT Stop Time: 1155    PT Total Time (min): 14 min       Billable Minutes:  Gait Training 14    Treatment Type: Treatment  PT/PTA: PT             General Precautions: Standard, fall  Orthopedic Precautions: N/A   Braces: N/A         Subjective:  Communicated with NURSE MARIO AND EPIC CHART REVIEW  prior to session.   PT AGREED TO TX     Pain/Comfort  Pain Rating 1: 0/10  Pain Rating Post-Intervention 1: 0/10    Objective:   Patient found with: peripheral IV    Functional Mobility:  PT SUP>SIT EOB WITH S. PT STOOD WITH NO AD AND CGA FOR GT X 220' WITH NO AD AND CGA>SBA. PT WITH R L FOOT DRAG. PT RETURNED TO RM T/F TO CHAIR WITH SBA. PT SET UP WITH LUNCH PRESENT AND ALL NEEDS MET PT CALL BELL IN REACH.     AM-PAC 6 CLICK MOBILITY  How much help from another person does this patient currently need?   1 = Unable, Total/Dependent Assistance  2 = A lot, Maximum/Moderate Assistance  3 = A little, Minimum/Contact Guard/Supervision  4 = None, Modified [place]/Independent    Turning over in bed (including adjusting bedclothes, sheets and blankets)?: 4  Sitting down on and standing up from a chair with arms (e.g., wheelchair, bedside commode, etc.): 3  Moving from lying on back to sitting on the side of the bed?: 4  Moving to and from a bed to a chair (including a wheelchair)?: 3  Need to walk in hospital room?: 3  Climbing 3-5 steps with a railing?: 1  Basic Mobility Total Score: 18    AM-PAC Raw Score CMS G-Code Modifier Level of Impairment Assistance   6 % Total / Unable   7 - 9 CM 80 - 100% Maximal Assist   10 - 14 CL 60 - 80% Moderate Assist   15 - 19 CK 40 - 60% Moderate Assist   20 - 22 CJ 20 - 40% Minimal Assist   23 CI 1-20% SBA / CGA   24 CH 0% Independent/ Mod I     Patient left up in chair with call  button in reach.    Assessment:  PT PROGRESSING WITH GT.     Rehab identified problem list/impairments: Rehab identified problem list/impairments: impaired balance, impaired endurance, gait instability, impaired functional mobilty    Rehab potential is good.    Activity tolerance: Fair    Discharge recommendations: Discharge Facility/Level Of Care Needs: home     Barriers to discharge:      Equipment recommendations: Equipment Needed After Discharge: none     GOALS:    Physical Therapy Goals        Problem: Physical Therapy Goal    Goal Priority Disciplines Outcome Goal Variances Interventions   Physical Therapy Goal     PT/OT, PT Ongoing (interventions implemented as appropriate)     Description:  ltg's for PT by 7/15/18  1. Patient will perform sit to stand indep  2. Patient will amb mod indep x 300ft no lob  3. Patient will perform stand b le exs x 20 reps                     PLAN:    Patient to be seen 5 x/week  to address the above listed problems via gait training, therapeutic activities, therapeutic exercises  Plan of Care expires: 07/15/18  Plan of Care reviewed with: patient         Angela Wintermaru, PT  07/10/2018

## 2018-07-10 NOTE — ASSESSMENT & PLAN NOTE
- CT scan: diverticulitis without abscess  - Blood cultures with gram negative bacteremia, BACTEROIDES VULGATUS. Susceptibilities pending  - Continue IV Zosyn and Cipro  - Tolerated advanced diet

## 2018-07-11 PROBLEM — K57.40: Status: ACTIVE | Noted: 2018-07-11

## 2018-07-11 PROBLEM — E87.6 HYPOKALEMIA: Status: RESOLVED | Noted: 2018-07-10 | Resolved: 2018-07-11

## 2018-07-11 PROBLEM — K57.80 PERFORATED DIVERTICULUM: Status: ACTIVE | Noted: 2018-07-11

## 2018-07-11 PROBLEM — K57.20 DIVERTICULITIS OF LARGE INTESTINE WITH ABSCESS WITHOUT BLEEDING: Status: ACTIVE | Noted: 2018-07-07

## 2018-07-11 PROBLEM — J18.9 PNEUMONIA DUE TO INFECTIOUS ORGANISM: Status: ACTIVE | Noted: 2018-07-11

## 2018-07-11 LAB
ALBUMIN SERPL BCP-MCNC: 2.4 G/DL
ALP SERPL-CCNC: 119 U/L
ALT SERPL W/O P-5'-P-CCNC: 10 U/L
ANION GAP SERPL CALC-SCNC: 8 MMOL/L
AST SERPL-CCNC: 24 U/L
BASOPHILS # BLD AUTO: 0.01 K/UL
BASOPHILS NFR BLD: 0.1 %
BILIRUB SERPL-MCNC: 1.4 MG/DL
BUN SERPL-MCNC: 5 MG/DL
CALCIUM SERPL-MCNC: 9.4 MG/DL
CHLORIDE SERPL-SCNC: 110 MMOL/L
CO2 SERPL-SCNC: 19 MMOL/L
CREAT SERPL-MCNC: 0.6 MG/DL
DIFFERENTIAL METHOD: ABNORMAL
EOSINOPHIL # BLD AUTO: 0 K/UL
EOSINOPHIL NFR BLD: 0.4 %
ERYTHROCYTE [DISTWIDTH] IN BLOOD BY AUTOMATED COUNT: 12.6 %
EST. GFR  (AFRICAN AMERICAN): >60 ML/MIN/1.73 M^2
EST. GFR  (NON AFRICAN AMERICAN): >60 ML/MIN/1.73 M^2
GLUCOSE SERPL-MCNC: 110 MG/DL
HCT VFR BLD AUTO: 26.6 %
HGB BLD-MCNC: 9 G/DL
LYMPHOCYTES # BLD AUTO: 1.1 K/UL
LYMPHOCYTES NFR BLD: 14.1 %
MCH RBC QN AUTO: 31.3 PG
MCHC RBC AUTO-ENTMCNC: 33.8 G/DL
MCV RBC AUTO: 92 FL
MONOCYTES # BLD AUTO: 1 K/UL
MONOCYTES NFR BLD: 12.2 %
NEUTROPHILS # BLD AUTO: 5.8 K/UL
NEUTROPHILS NFR BLD: 73.2 %
PLATELET # BLD AUTO: 178 K/UL
PMV BLD AUTO: 10.1 FL
POTASSIUM SERPL-SCNC: 3.5 MMOL/L
PROT SERPL-MCNC: 5.9 G/DL
RBC # BLD AUTO: 2.88 M/UL
SODIUM SERPL-SCNC: 137 MMOL/L
WBC # BLD AUTO: 7.88 K/UL

## 2018-07-11 PROCEDURE — 86361 T CELL ABSOLUTE COUNT: CPT

## 2018-07-11 PROCEDURE — 87106 FUNGI IDENTIFICATION YEAST: CPT

## 2018-07-11 PROCEDURE — 80053 COMPREHEN METABOLIC PANEL: CPT

## 2018-07-11 PROCEDURE — S0030 INJECTION, METRONIDAZOLE: HCPCS | Performed by: EMERGENCY MEDICINE

## 2018-07-11 PROCEDURE — 63600175 PHARM REV CODE 636 W HCPCS: Performed by: EMERGENCY MEDICINE

## 2018-07-11 PROCEDURE — 87075 CULTR BACTERIA EXCEPT BLOOD: CPT

## 2018-07-11 PROCEDURE — 36415 COLL VENOUS BLD VENIPUNCTURE: CPT

## 2018-07-11 PROCEDURE — 63600175 PHARM REV CODE 636 W HCPCS: Performed by: NURSE PRACTITIONER

## 2018-07-11 PROCEDURE — 87077 CULTURE AEROBIC IDENTIFY: CPT

## 2018-07-11 PROCEDURE — 87070 CULTURE OTHR SPECIMN AEROBIC: CPT | Mod: 59

## 2018-07-11 PROCEDURE — 85025 COMPLETE CBC W/AUTO DIFF WBC: CPT

## 2018-07-11 PROCEDURE — 25000003 PHARM REV CODE 250: Performed by: EMERGENCY MEDICINE

## 2018-07-11 PROCEDURE — 99221 1ST HOSP IP/OBS SF/LOW 40: CPT | Mod: ,,, | Performed by: PHYSICIAN ASSISTANT

## 2018-07-11 PROCEDURE — 25000003 PHARM REV CODE 250: Performed by: NURSE PRACTITIONER

## 2018-07-11 PROCEDURE — 0W9G30Z DRAINAGE OF PERITONEAL CAVITY WITH DRAINAGE DEVICE, PERCUTANEOUS APPROACH: ICD-10-PCS

## 2018-07-11 PROCEDURE — 63600175 PHARM REV CODE 636 W HCPCS: Performed by: RADIOLOGY

## 2018-07-11 PROCEDURE — 11000001 HC ACUTE MED/SURG PRIVATE ROOM

## 2018-07-11 PROCEDURE — 87205 SMEAR GRAM STAIN: CPT

## 2018-07-11 PROCEDURE — 87186 SC STD MICRODIL/AGAR DIL: CPT

## 2018-07-11 RX ORDER — MIDAZOLAM HYDROCHLORIDE 1 MG/ML
INJECTION INTRAMUSCULAR; INTRAVENOUS CODE/TRAUMA/SEDATION MEDICATION
Status: COMPLETED | OUTPATIENT
Start: 2018-07-11 | End: 2018-07-11

## 2018-07-11 RX ORDER — FENTANYL CITRATE 50 UG/ML
INJECTION, SOLUTION INTRAMUSCULAR; INTRAVENOUS CODE/TRAUMA/SEDATION MEDICATION
Status: COMPLETED | OUTPATIENT
Start: 2018-07-11 | End: 2018-07-11

## 2018-07-11 RX ADMIN — FERROUS GLUCONATE TAB 324 MG (37.5 MG ELEMENTAL IRON) 324 MG: 324 (37.5 FE) TAB at 09:07

## 2018-07-11 RX ADMIN — PIPERACILLIN SODIUM AND TAZOBACTAM SODIUM 4.5 G: 4; .5 INJECTION, POWDER, LYOPHILIZED, FOR SOLUTION INTRAVENOUS at 10:07

## 2018-07-11 RX ADMIN — CYPROHEPTADINE HYDROCHLORIDE 4 MG: 4 TABLET ORAL at 08:07

## 2018-07-11 RX ADMIN — ROSUVASTATIN CALCIUM 20 MG: 10 TABLET, FILM COATED ORAL at 09:07

## 2018-07-11 RX ADMIN — MIDAZOLAM HYDROCHLORIDE 1 MG: 1 INJECTION, SOLUTION INTRAMUSCULAR; INTRAVENOUS at 03:07

## 2018-07-11 RX ADMIN — BUPROPION HYDROCHLORIDE 150 MG: 150 TABLET, FILM COATED, EXTENDED RELEASE ORAL at 09:07

## 2018-07-11 RX ADMIN — BUPROPION HYDROCHLORIDE 150 MG: 150 TABLET, FILM COATED, EXTENDED RELEASE ORAL at 08:07

## 2018-07-11 RX ADMIN — POLYETHYLENE GLYCOL (3350) 17 G: 17 POWDER, FOR SOLUTION ORAL at 09:07

## 2018-07-11 RX ADMIN — CYPROHEPTADINE HYDROCHLORIDE 4 MG: 4 TABLET ORAL at 09:07

## 2018-07-11 RX ADMIN — STANDARDIZED SENNA CONCENTRATE AND DOCUSATE SODIUM 2 TABLET: 8.6; 5 TABLET, FILM COATED ORAL at 08:07

## 2018-07-11 RX ADMIN — CIPROFLOXACIN 400 MG: 2 INJECTION, SOLUTION INTRAVENOUS at 02:07

## 2018-07-11 RX ADMIN — FENTANYL CITRATE 50 MCG: 50 INJECTION, SOLUTION INTRAMUSCULAR; INTRAVENOUS at 03:07

## 2018-07-11 RX ADMIN — PIPERACILLIN SODIUM AND TAZOBACTAM SODIUM 4.5 G: 4; .5 INJECTION, POWDER, LYOPHILIZED, FOR SOLUTION INTRAVENOUS at 06:07

## 2018-07-11 RX ADMIN — METRONIDAZOLE 500 MG: 500 INJECTION, SOLUTION INTRAVENOUS at 05:07

## 2018-07-11 NOTE — SEDATION DOCUMENTATION
Procedure complete. Pt tolerated well, vss. 48ml yellow drainage removed from anterior pelvis. 55ml green drainage removed for posterior pelvis. 8.5F JOSE catheters placed to anterior and posterior pelvic areas to bulb suction; secured with sutures, tegaderm, stat lock and 4x4 gauze. Pt transferred self to stretcher and transported to radiology recovery for monitoring.

## 2018-07-11 NOTE — ASSESSMENT & PLAN NOTE
CT reviewed  Recommend ct guided abscess drainage  Continue antibiotics    She may require colon resection after this acute episode has resolved

## 2018-07-11 NOTE — NURSING
Patient refusing all medications and states she would like to go home. Informed patient she would have to sign out AMA. Patient stated she does not want to sign out that she would stay. Informed ALIS Estrella.

## 2018-07-11 NOTE — PLAN OF CARE
Problem: Patient Care Overview  Goal: Plan of Care Review  Outcome: Ongoing (interventions implemented as appropriate)  Fall precautions maintained. Pt free from falls/injuries. Pt repositions and ambulates independently. Pt has occasional c/o pain. Pain well controlled with repositioning medication. Infection control with IV abx. Fluid promotion with PO fluids and IV fluids. Patient turns every 2 hours. POC and meds reviewed. Patient verbalized understanding. Side rails up x2. Bed Low and locked. Personal items and call light within reach. No signs/symptoms of acute distress. Chart check done. Will monitor

## 2018-07-11 NOTE — ASSESSMENT & PLAN NOTE
"- Sees Dr. Doe at [facility] outpatient  - Pt unsure of last CD4 count but was told "it was good."  - Continue home medications    "

## 2018-07-11 NOTE — NURSING
Patient states she is extremely frustrated and does not understand why she cannot eat. Spoke with patient and informed her that CT and Xray did not look at progressive as hoped and there were some findings that may require procedure/surgery. Informed patient surgery had been consulted and will be coming to speak with patient following review of scans. Patient verbalized understanding.

## 2018-07-11 NOTE — OP NOTE
Sterile technique was performed in the anterior and posterior pelvis, lidocaine was used as a local anesthetic.  8.5 Romanian catheter placed into posterior pelvic fluid collection and approx 57 ccs of light greenish fluid removed and sent to lab.  8.5 Romanian catheter placed into anterior fluid collection and approx 48 ccs of dark green cloudy fluid removed and sent to lab for eval.  Both catheters secured in place with suture, gauze and tegaderm dressing.  Pt tolerated the procedure well without immediate complications.  Please see radiologist report for details. F/u with PCP and/or ordering physician.

## 2018-07-11 NOTE — CONSULTS
[facility] -   General Surgery  Consult Note    Patient Name: Lizz Noguera  MRN: [id number]  Code Status: Full Code  Admission Date: 7/7/2018  Hospital Length of Stay: 4 days  Attending Physician: Noble Neal MD  Primary Care Provider: Joseluis Doe MD (Inactive)    Patient information was obtained from patient and ER records.     Consults  Subjective:     Principal Problem: Diverticulitis of large intestine with abscess without bleeding    History of Present Illness: Lizz Noguera is a 57 y.o. female patient with a PMHx of HIV, CVA with right sided residual weakness, HLP, depression. She initially presented to the ER for suprapubic abd pain and was found to have acute uncomplicated diverticulitis. She was admitted and treated with IV antibiotics. She initially improved, however yesterday she had a fever of 101 and increasing abdominal tenderness and distention. A CT was repeated and is concerning for perforated diverticulitis with abscess. General Surgery was consulted.    No current facility-administered medications on file prior to encounter.      Current Outpatient Prescriptions on File Prior to Encounter   Medication Sig    emtricitabine-tenofovir 200-300 mg (TRUVADA) 200-300 mg Tab Take 1 tablet by mouth once daily.       Review of patient's allergies indicates:   Allergen Reactions    Bactrim [sulfamethoxazole-trimethoprim] Rash       Past Medical History:   Diagnosis Date    Anemia     Cholelithiasis     CVA (cerebral vascular accident)     Diverticulitis     Glaucoma     HIV disease     HLD (hyperlipidemia)     HTN (hypertension)      Past Surgical History:   Procedure Laterality Date    NONE       Family History     Problem Relation (Age of Onset)    Stroke Mother        Social History Main Topics    Smoking status: Current Every Day Smoker     Packs/day: 1.00     Years: 46.00     Types: Cigarettes    Smokeless tobacco: Never Used    Alcohol use No    Drug use: No     Sexual activity: Yes     Partners: Male     Review of Systems   Constitutional: Positive for fever. Negative for activity change.   Respiratory: Negative for shortness of breath.    Cardiovascular: Negative for chest pain.   Gastrointestinal: Positive for abdominal distention and abdominal pain. Negative for nausea and vomiting.   Genitourinary: Negative for dysuria.   Musculoskeletal: Negative for myalgias.   Skin: Negative for wound.   Neurological: Negative for weakness.   Hematological: Does not bruise/bleed easily.   Psychiatric/Behavioral: The patient is not nervous/anxious.      Objective:     Vital Signs (Most Recent):  Temp: 99.8 °F (37.7 °C) (07/11/18 0742)  Pulse: 92 (07/11/18 0742)  Resp: (!) 24 (07/11/18 0742)  BP: (!) 140/67 (07/11/18 0742)  SpO2: 98 % (07/11/18 0742) Vital Signs (24h Range):  Temp:  [98.6 °F (37 °C)-99.8 °F (37.7 °C)] 99.8 °F (37.7 °C)  Pulse:  [90-97] 92  Resp:  [16-24] 24  SpO2:  [95 %-99 %] 98 %  BP: (133-151)/(63-71) 140/67     Weight: 63 kg (138 lb 14.2 oz)  Body mass index is 23.11 kg/m².    Physical Exam   Constitutional: She is oriented to person, place, and time. She appears well-developed. No distress.   HENT:   Head: Normocephalic and atraumatic.   Eyes: EOM are normal.   Cardiovascular: Normal rate and regular rhythm.    Pulmonary/Chest: Effort normal. No respiratory distress.   Abdominal: Soft. She exhibits distension (mild soft distention). There is tenderness (suprapubic ttp ).   Musculoskeletal: Normal range of motion.   Neurological: She is alert and oriented to person, place, and time.   Skin: Skin is warm and dry.   Psychiatric: She has a normal mood and affect. Thought content normal.   Vitals reviewed.      Significant Labs:  CBC:   Recent Labs  Lab 07/11/18 0437   WBC 7.88   RBC 2.88*   HGB 9.0*   HCT 26.6*      MCV 92   MCH 31.3*   MCHC 33.8     CMP:   Recent Labs  Lab 07/11/18  0437      CALCIUM 9.4   ALBUMIN 2.4*   PROT 5.9*      K 3.5    CO2 19*      BUN 5*   CREATININE 0.6   ALKPHOS 119   ALT 10   AST 24   BILITOT 1.4*       Significant Diagnostics:  I have reviewed all pertinent imaging results/findings within the past 24 hours.    Assessment/Plan:     * Diverticulitis of large intestine with abscess without bleeding    CT reviewed  Recommend ct guided abscess drainage  Continue antibiotics    She may require colon resection after this acute episode has resolved          VTE Risk Mitigation         Ordered     IP VTE LOW RISK PATIENT  Once      07/07/18 1357     Place sequential compression device  Until discontinued      07/07/18 1357          Thank you for your consult. I will follow-up with patient. Please contact us if you have any additional questions.    Ramona Robbins PA-C  General Surgery  [facility]

## 2018-07-11 NOTE — ASSESSMENT & PLAN NOTE
- Due to fever (101.4) and abdominal distension, CT abdomen/pelvis with contrast showed anterior pelvic abscess measuring 8.5 cm with several foci of free air adjacent to the sigmoid colon in the mesentery and liver likely from perforated diverticulum  - General surgery consulted and recommended ct guided abscess drainage  - IR to perform abscess drainage today, 07/11/18

## 2018-07-11 NOTE — SUBJECTIVE & OBJECTIVE
No current facility-administered medications on file prior to encounter.      Current Outpatient Prescriptions on File Prior to Encounter   Medication Sig    emtricitabine-tenofovir 200-300 mg (TRUVADA) 200-300 mg Tab Take 1 tablet by mouth once daily.       Review of patient's allergies indicates:   Allergen Reactions    Bactrim [sulfamethoxazole-trimethoprim] Rash       Past Medical History:   Diagnosis Date    Anemia     Cholelithiasis     CVA (cerebral vascular accident)     Diverticulitis     Glaucoma     HIV disease     HLD (hyperlipidemia)     HTN (hypertension)      Past Surgical History:   Procedure Laterality Date    NONE       Family History     Problem Relation (Age of Onset)    Stroke Mother        Social History Main Topics    Smoking status: Current Every Day Smoker     Packs/day: 1.00     Years: 46.00     Types: Cigarettes    Smokeless tobacco: Never Used    Alcohol use No    Drug use: No    Sexual activity: Yes     Partners: Male     Review of Systems   Constitutional: Positive for fever. Negative for activity change.   Respiratory: Negative for shortness of breath.    Cardiovascular: Negative for chest pain.   Gastrointestinal: Positive for abdominal distention and abdominal pain. Negative for nausea and vomiting.   Genitourinary: Negative for dysuria.   Musculoskeletal: Negative for myalgias.   Skin: Negative for wound.   Neurological: Negative for weakness.   Hematological: Does not bruise/bleed easily.   Psychiatric/Behavioral: The patient is not nervous/anxious.      Objective:     Vital Signs (Most Recent):  Temp: 99.8 °F (37.7 °C) (07/11/18 0742)  Pulse: 92 (07/11/18 0742)  Resp: (!) 24 (07/11/18 0742)  BP: (!) 140/67 (07/11/18 0742)  SpO2: 98 % (07/11/18 0742) Vital Signs (24h Range):  Temp:  [98.6 °F (37 °C)-99.8 °F (37.7 °C)] 99.8 °F (37.7 °C)  Pulse:  [90-97] 92  Resp:  [16-24] 24  SpO2:  [95 %-99 %] 98 %  BP: (133-151)/(63-71) 140/67     Weight: 63 kg (138 lb 14.2  oz)  Body mass index is 23.11 kg/m².    Physical Exam   Constitutional: She is oriented to person, place, and time. She appears well-developed. No distress.   HENT:   Head: Normocephalic and atraumatic.   Eyes: EOM are normal.   Cardiovascular: Normal rate and regular rhythm.    Pulmonary/Chest: Effort normal. No respiratory distress.   Abdominal: Soft. She exhibits distension (mild soft distention). There is tenderness (suprapubic ttp ).   Musculoskeletal: Normal range of motion.   Neurological: She is alert and oriented to person, place, and time.   Skin: Skin is warm and dry.   Psychiatric: She has a normal mood and affect. Thought content normal.   Vitals reviewed.      Significant Labs:  CBC:   Recent Labs  Lab 07/11/18  0437   WBC 7.88   RBC 2.88*   HGB 9.0*   HCT 26.6*      MCV 92   MCH 31.3*   MCHC 33.8     CMP:   Recent Labs  Lab 07/11/18 0437      CALCIUM 9.4   ALBUMIN 2.4*   PROT 5.9*      K 3.5   CO2 19*      BUN 5*   CREATININE 0.6   ALKPHOS 119   ALT 10   AST 24   BILITOT 1.4*       Significant Diagnostics:  I have reviewed all pertinent imaging results/findings within the past 24 hours.

## 2018-07-11 NOTE — PROGRESS NOTES
[facility] Hospital Medicine  Progress Note    Patient Name: Lizz Noguera  MRN: [id number]  Patient Class: IP- Inpatient   Admission Date: 7/7/2018  Length of Stay: 4 days  Attending Physician: Noble Neal MD  Primary Care Provider: Joseluis Doe MD (Inactive)        Subjective:     Principal Problem:Diverticulitis of large intestine with abscess without bleeding    HPI:  Lizz Noguera is a 57 y.o. female patient with a PMHx of HIV, CVA with right sided residual weakness, HLP, depression, who presented to the ER for suprapubic abd pain which onset suddenly this AM. Associated sxs included constipation. Patient denies any n/v/d, hematochezia, dysuria, hematuria, difficulty urinating, frequency, fever, chills, and all other sxs at this time. Pt denies any history of abdominal surgeries. She still has her appendix. CT scan showed uncomplicated diverticulitis of the sigmoid without abscess. Blood cultures pending. In ER, Temp 103.7, glucose 148, lactic acid 2.5. She is admitted with diverticulitis on IV flagyl and IV cipro.     Hospital Course:  Ms Noguera is a 57 year old female with PMHx HIV, CVA, HLP who presented to complaints of lower abdominal pain. Associated symptoms include constipation. CT scan of the abdomen showed uncomplicated diverticulitis of the sigmoid without abscess. She is currently on Cipro IV and Flagyl IV. She received Senna-docusate and Miralax on yesterday with no BM results. She continues to complain of constipation. Will give mag citrate X 1 and monitor.    7/9/2018- Patient seen and examined today. She is not having abdomen pain and is not constipation. Patient not able to eat regular diet this afternoon. Vital signs stable. Continuing Cipro IV and Flagyl IV.     07/10/18 -- Fever overnight of 101.4. Blood cultures (+) gram negative rods, BACTEROIDES VULGATUS. Susceptibilities pending. Pt currently receiving IV Zosyn and Cipro. Diet advanced, tolerated without abdominal  "pain, N/V. Pt reports, "my stomach is bigger than normal, I can barely see my belly button." Mild distension noted. Will obtain abdominal US to assess further.    07/11/18 -- Due to fever and abdominal distension yesterday obtained repeat CT. CT abdomen/pelvis with contrast showed anterior pelvic abscess measuring 8.5 cm with several foci of free air adjacent to the sigmoid colon in the mesentery and liver likely from perforated diverticulum. General surgery consulted and recommended ct guided abscess drainage to be performed by IR. Antibiotics changed to IV Zosyn only. Repeat blood cultures with NGTD.       Review of Systems   Constitutional: Positive for activity change and fever.   HENT: Negative.    Eyes: Negative.    Respiratory: Negative for apnea, cough, choking, chest tightness, shortness of breath, wheezing and stridor.    Cardiovascular: Negative for chest pain, palpitations and leg swelling.   Gastrointestinal: Positive for abdominal distention, abdominal pain and diarrhea. Negative for constipation, nausea and vomiting.   Endocrine: Negative.    Genitourinary: Negative.    Musculoskeletal: Negative.    Skin: Negative.    Allergic/Immunologic: Negative.    Neurological: Negative for dizziness, tremors, seizures, syncope, facial asymmetry, speech difficulty, weakness, light-headedness, numbness and headaches.   Hematological: Negative.    Psychiatric/Behavioral: Negative.      Objective:     Vital Signs (Most Recent):  Temp: 98.4 °F (36.9 °C) (07/11/18 1135)  Pulse: 92 (07/11/18 1555)  Resp: 20 (07/11/18 1555)  BP: 121/62 (07/11/18 1555)  SpO2: 95 % (07/11/18 1555) Vital Signs (24h Range):  Temp:  [98.4 °F (36.9 °C)-99.8 °F (37.7 °C)] 98.4 °F (36.9 °C)  Pulse:  [90-97] 92  Resp:  [16-24] 20  SpO2:  [95 %-98 %] 95 %  BP: (121-148)/(62-71) 121/62     Weight: 63 kg (138 lb 14.2 oz)  Body mass index is 23.11 kg/m².    Intake/Output Summary (Last 24 hours) at 07/11/18 1600  Last data filed at 07/11/18 1320   " Gross per 24 hour   Intake          5366.68 ml   Output                0 ml   Net          5366.68 ml      Physical Exam   Constitutional: She is oriented to person, place, and time. She appears well-developed. She is cooperative. She is easily aroused. No distress.   HENT:   Head: Normocephalic and atraumatic.   Eyes: EOM are normal.   Neck: Normal range of motion. Neck supple.   Cardiovascular: Normal rate, regular rhythm, normal heart sounds and intact distal pulses.    No murmur heard.  Pulmonary/Chest: Effort normal and breath sounds normal. No respiratory distress. She has no wheezes. She has no rales. She exhibits no tenderness.   Abdominal: Soft. Bowel sounds are normal. She exhibits distension (mild). There is tenderness in the right lower quadrant. There is rebound. There is no rigidity, no guarding and no CVA tenderness.   Genitourinary:   Genitourinary Comments: Deferred   Musculoskeletal: Normal range of motion.   Neurological: She is alert, oriented to person, place, and time and easily aroused. No sensory deficit.   Skin: Skin is warm and dry. Capillary refill takes less than 2 seconds.   Psychiatric: She has a normal mood and affect. Her behavior is normal. Judgment and thought content normal.   Nursing note and vitals reviewed.      Significant Labs:   CBC:   Recent Labs  Lab 07/10/18  0418 07/11/18  0437   WBC 9.89 7.88   HGB 9.5* 9.0*   HCT 28.0* 26.6*    178     CMP:   Recent Labs  Lab 07/10/18  0418 07/11/18  0437   * 137   K 3.2* 3.5    110   CO2 20* 19*   * 110   BUN 9 5*   CREATININE 0.7 0.6   CALCIUM 9.1 9.4   PROT 5.8* 5.9*   ALBUMIN 2.4* 2.4*   BILITOT 1.8* 1.4*   ALKPHOS 126 119   AST 23 24   ALT 13 10   ANIONGAP 7* 8   EGFRNONAA >60 >60       Significant Imaging:   Imaging Results          CT Abdomen Pelvis With Contrast (Final result)  Result time 07/07/18 11:30:11    Final result by Arpan Del Castillo MD (07/07/18 11:30:11)                 Impression:      1.   Significant thickening of the mid sigmoid colon associated with diverticula, with surrounding edema.  Associated ascites in the cul-de-sac and around the liver.  Findings are concerning for uncomplicated diverticulitis of the sigmoid colon.  Negative for free air or focal drainable fluid collection.    2. Negative for acute process involving the abdomen or pelvis otherwise.  Normal appendix.  Negative for renal stone disease or hydronephrosis.    3.  Moderate to severe multifactorial spinal canal stenosis at L4/L5 and L5/S1.  Clinical correlation is advised.    4.  Avascular necrosis changes to the left greater than right hip joints.  Clinical correlation is advised.    5.  Cholelithiasis.  Small fat filled umbilical hernia.  Other nonemergent findings as described above.    All CT scans at this facility used dose modulation, iterative reconstruction, and/or weight based dosing when appropriate to reduce radiation dose to as low as reasonably achievable.      Electronically signed by: Arpan Del Castillo MD  Date:    07/07/2018  Time:    11:30             Narrative:    EXAMINATION:  CT ABDOMEN PELVIS WITH CONTRAST    CLINICAL HISTORY:  Lower abdominal pain;    TECHNIQUE:  Axial images through the abdomen and pelvis were obtained with the use of IV contrast.  Sagittal and coronal reconstructions are provided for review.  Oral contrast was not utilized.    COMPARISON:  None    FINDINGS:  No comparison studies are available.    LUNG BASES:   Lung bases are clear.  Negative for pleural or pericardial effusions. The distal esophagus is normal.    ABDOMEN: Gallstones.  The liver, spleen and gallbladder otherwise appear normal.  The pancreas is unremarkable.  Kidneys and adrenal glands are normal.    Negative for adenopathy noted within the abdomen or pelvis.  Vascular calcifications are present without aneurysmal changes. Portal vein is patent.    The stomach and small bowel appear normal.  There is significant edema within  sigmoid colon with associated diverticula.  With moderate surrounding edema.  Mild to moderate ascites in the cul-de-sac and mild ascites around the liver.  The rest of the colon appears normal.  A normal appendix.    PELVIS: The urinary bladder is unremarkable.    The female pelvic organs are normal. There are pelvic phleboliths.    No significant osseous abnormality is identified.  Broad-based disc bulge at L4/L5 and L5/S1 with moderate to severe multifactorial spinal canal stenosis.  There are significant degenerative changes a changes of the femoral heads.  Osteopenia.    Negative for groin adenopathy.    Tiny fat filled umbilical hernia.  The abdominal wall is otherwise intact.                               X-Ray Chest AP Portable (Final result)  Result time 07/07/18 10:18:44    Final result by Arpan Del Castillo MD (07/07/18 10:18:44)                 Impression:      1.  Negative for acute process involving the chest, considering there are lower lung volumes on today's study.    2.  Stable findings as noted above.      Electronically signed by: Arpan Del Castillo MD  Date:    07/07/2018  Time:    10:18             Narrative:    EXAMINATION:  XR CHEST AP PORTABLE    CLINICAL HISTORY:  Sepsis;    COMPARISON:  2 June 2014    FINDINGS:  EKG leads overlie the chest.  Mildly low lung volumes.  The lungs are otherwise clear.  The cardiac silhouette size is normal. The trachea is midline and the mediastinal width is normal. Negative for focal infiltrate, effusion or pneumothorax. Pulmonary vasculature is normal. Negative for osseous abnormalities. Tortuous aorta.  Convex left curvature of the lower thoracic spine.  Stable calcifications within the central portions of both humeri, possibly bone infarctions.                              Assessment/Plan:      * Diverticulitis of large intestine with abscess without bleeding    - Initial CT scan upon admit showed diverticulitis without abscess. Pt was treated for diverticulitis  "initially with IV Cipro and Flagyl  - Blood cultures with gram negative bacteremia, BACTEROIDES VULGATUS. Normally susceptible to Flagyl and Zosyn  - Repeat blood cultures with NGTD  - Due to fever (101.4) and abdominal distension yesterday, 07/10/18, obtained CT chest/abdomen/pelvis with contrast which showed anterior pelvic abscess measuring 8.5 cm with several foci of free air adjacent to the sigmoid colon in the mesentery and liver likely from perforated diverticulum  - General surgery consulted and recommended ct guided abscess drainage -- to be performed by IR  - Cipro and Flagyl discontinued today, 07/11/18, and IV Zosyn started          Perforated diverticulum    - Due to fever (101.4) and abdominal distension, CT abdomen/pelvis with contrast showed anterior pelvic abscess measuring 8.5 cm with several foci of free air adjacent to the sigmoid colon in the mesentery and liver likely from perforated diverticulum  - General surgery consulted and recommended ct guided abscess drainage  - IR to perform abscess drainage today, 07/11/18          Gram-negative bacteremia    - Blood cultures with gram negative bacteremia, BACTEROIDES VULGATUS. Normally susceptible to Zosyn or Flagyl  - Cipro/Flagyl discontinued today due to CT findings. Pt had a total of 4 days of Flagyl before discontinuation   - Start IV Zosyn  - Repeat blood cultures with NGTD          Pneumonia due to infectious organism    - CT chest/abdomen/pelvis with contrast showed multilobar infiltrates with small right effusion concerning for pneumonia  - CXR showed no infiltrates and CT abdomen/pelvis with contrast negative for infiltrates at lung bases -- both obtained on admit  - Currently afebrile, no WBC count. Hx of HIV, will obtain CD 4 count. Will continue IV Zosyn for now  - Repeat blood cultures with NGTD           HIV (human immunodeficiency virus infection)    - Sees Dr. Doe at [facility] outpatient  - Pt unsure of last CD4 count but was told "it " "was good."  - Continue home medications          Tobacco abuse    - Smoking cessation counseling  - Nicotine patch              VTE Risk Mitigation         Ordered     IP VTE LOW RISK PATIENT  Once      07/07/18 1357     Place sequential compression device  Until discontinued      07/07/18 1357              ALIS Barton  Department of Hospital Medicine   [facility]  "

## 2018-07-11 NOTE — ASSESSMENT & PLAN NOTE
- Initial CT scan upon admit showed diverticulitis without abscess. Pt was treated for diverticulitis initially with IV Cipro and Flagyl  - Blood cultures with gram negative bacteremia, BACTEROIDES VULGATUS. Normally susceptible to Flagyl and Zosyn  - Repeat blood cultures with NGTD  - Due to fever (101.4) and abdominal distension yesterday, 07/10/18, obtained CT chest/abdomen/pelvis with contrast which showed anterior pelvic abscess measuring 8.5 cm with several foci of free air adjacent to the sigmoid colon in the mesentery and liver likely from perforated diverticulum  - General surgery consulted and recommended ct guided abscess drainage -- to be performed by IR  - Cipro and Flagyl discontinued today, 07/11/18, and IV Zosyn started

## 2018-07-11 NOTE — ASSESSMENT & PLAN NOTE
- CT chest/abdomen/pelvis with contrast showed multilobar infiltrates with small right effusion concerning for pneumonia  - CXR showed no infiltrates and CT abdomen/pelvis with contrast negative for infiltrates at lung bases -- both obtained on admit  - Currently afebrile, no WBC count. Hx of HIV, will obtain CD 4 count. Will continue IV Zosyn for now  - Repeat blood cultures with NGTD

## 2018-07-11 NOTE — SEDATION DOCUMENTATION
Pt lying on ct table on left side with bilateral arms above head. Pt vss, cardiac monitoring in place. Pt verbalized understanding and all questions answered

## 2018-07-11 NOTE — PT/OT/SLP PROGRESS
Physical Therapy      Patient Name:  Lizz Noguera   MRN:  [id number]    0831 PT MET IN RM. PT UPSET AS SHE WANTS TO EAT AND WANTS TO GO HOME. PT REFUSED P.T.     Angela Moon, PT,7/11/2018

## 2018-07-11 NOTE — CONSULTS
Inpatient consult to Interventional Radiology  Consult performed by: SHAYY TRINIDAD  Consult ordered by: ZA RIVERA  Reason for consult: Pelvic abscesses  Assessment/Recommendations: Recommend CT guided drainage catheters.  Will perform ASAP.  Thank you for the consult.

## 2018-07-11 NOTE — H&P (VIEW-ONLY)
[facility] -   General Surgery  Consult Note    Patient Name: Lizz Noguera  MRN: [id number]  Code Status: Full Code  Admission Date: 7/7/2018  Hospital Length of Stay: 4 days  Attending Physician: Noble Neal MD  Primary Care Provider: Joseluis Doe MD (Inactive)    Patient information was obtained from patient and ER records.     Consults  Subjective:     Principal Problem: Diverticulitis of large intestine with abscess without bleeding    History of Present Illness: Lizz Noguera is a 57 y.o. female patient with a PMHx of HIV, CVA with right sided residual weakness, HLP, depression. She initially presented to the ER for suprapubic abd pain and was found to have acute uncomplicated diverticulitis. She was admitted and treated with IV antibiotics. She initially improved, however yesterday she had a fever of 101 and increasing abdominal tenderness and distention. A CT was repeated and is concerning for perforated diverticulitis with abscess. General Surgery was consulted.    No current facility-administered medications on file prior to encounter.      Current Outpatient Prescriptions on File Prior to Encounter   Medication Sig    emtricitabine-tenofovir 200-300 mg (TRUVADA) 200-300 mg Tab Take 1 tablet by mouth once daily.       Review of patient's allergies indicates:   Allergen Reactions    Bactrim [sulfamethoxazole-trimethoprim] Rash       Past Medical History:   Diagnosis Date    Anemia     Cholelithiasis     CVA (cerebral vascular accident)     Diverticulitis     Glaucoma     HIV disease     HLD (hyperlipidemia)     HTN (hypertension)      Past Surgical History:   Procedure Laterality Date    NONE       Family History     Problem Relation (Age of Onset)    Stroke Mother        Social History Main Topics    Smoking status: Current Every Day Smoker     Packs/day: 1.00     Years: 46.00     Types: Cigarettes    Smokeless tobacco: Never Used    Alcohol use No    Drug use: No     Sexual activity: Yes     Partners: Male     Review of Systems   Constitutional: Positive for fever. Negative for activity change.   Respiratory: Negative for shortness of breath.    Cardiovascular: Negative for chest pain.   Gastrointestinal: Positive for abdominal distention and abdominal pain. Negative for nausea and vomiting.   Genitourinary: Negative for dysuria.   Musculoskeletal: Negative for myalgias.   Skin: Negative for wound.   Neurological: Negative for weakness.   Hematological: Does not bruise/bleed easily.   Psychiatric/Behavioral: The patient is not nervous/anxious.      Objective:     Vital Signs (Most Recent):  Temp: 99.8 °F (37.7 °C) (07/11/18 0742)  Pulse: 92 (07/11/18 0742)  Resp: (!) 24 (07/11/18 0742)  BP: (!) 140/67 (07/11/18 0742)  SpO2: 98 % (07/11/18 0742) Vital Signs (24h Range):  Temp:  [98.6 °F (37 °C)-99.8 °F (37.7 °C)] 99.8 °F (37.7 °C)  Pulse:  [90-97] 92  Resp:  [16-24] 24  SpO2:  [95 %-99 %] 98 %  BP: (133-151)/(63-71) 140/67     Weight: 63 kg (138 lb 14.2 oz)  Body mass index is 23.11 kg/m².    Physical Exam   Constitutional: She is oriented to person, place, and time. She appears well-developed. No distress.   HENT:   Head: Normocephalic and atraumatic.   Eyes: EOM are normal.   Cardiovascular: Normal rate and regular rhythm.    Pulmonary/Chest: Effort normal. No respiratory distress.   Abdominal: Soft. She exhibits distension (mild soft distention). There is tenderness (suprapubic ttp ).   Musculoskeletal: Normal range of motion.   Neurological: She is alert and oriented to person, place, and time.   Skin: Skin is warm and dry.   Psychiatric: She has a normal mood and affect. Thought content normal.   Vitals reviewed.      Significant Labs:  CBC:   Recent Labs  Lab 07/11/18 0437   WBC 7.88   RBC 2.88*   HGB 9.0*   HCT 26.6*      MCV 92   MCH 31.3*   MCHC 33.8     CMP:   Recent Labs  Lab 07/11/18  0437      CALCIUM 9.4   ALBUMIN 2.4*   PROT 5.9*      K 3.5    CO2 19*      BUN 5*   CREATININE 0.6   ALKPHOS 119   ALT 10   AST 24   BILITOT 1.4*       Significant Diagnostics:  I have reviewed all pertinent imaging results/findings within the past 24 hours.    Assessment/Plan:     * Diverticulitis of large intestine with abscess without bleeding    CT reviewed  Recommend ct guided abscess drainage  Continue antibiotics    She may require colon resection after this acute episode has resolved          VTE Risk Mitigation         Ordered     IP VTE LOW RISK PATIENT  Once      07/07/18 1357     Place sequential compression device  Until discontinued      07/07/18 1357          Thank you for your consult. I will follow-up with patient. Please contact us if you have any additional questions.    Ramona Robbins PA-C  General Surgery  [facility]

## 2018-07-11 NOTE — PLAN OF CARE
Problem: Patient Care Overview  Goal: Plan of Care Review  Outcome: Ongoing (interventions implemented as appropriate)  Pt remained free of injury during shift, stable condition, denied pain, reports abdominal fullness and tenderness to RLQ, tolerating diet, no acute distress, receiving IV fluids, receiving antibiotics, and will continue to monitor. 24hr chart review performed.

## 2018-07-11 NOTE — PHYSICIAN QUERY
"PT Name: Lizz Noguera  MR #: [id number]    Physician Query Form - HIV Clarification     CDS/: Kandace JONES, RN, CCDS              Contact information: [email or web address]    This form is a permanent document in the medical record.     Query Date: July 11, 2018      By submitting this query, we are merely seeking further clarification of documentation. Please utilize your independent clinical judgment when addressing the question(s) below.    The Medical record contains the following:                   Indicators               Supporting Clinical Findings  Location in Medical Record        x   HIV or AIDS     HPI: Lizz Noguera is a 57 y.o. female patient with a PMHx of HIV       Past Medical History:  Diagnosis      HIV disease        HIV (human immunodeficiency virus infection)    Resume home meds       [facility] Medicine H&P 7/7/18       [facility] Medicine H&P 7/7/18         [facility] Medicine H&P 7/7/18      x CD4 Count    CD4%        Viral Load  HIV (human immunodeficiency virus infection)   - Sees Dr. Doe at [facility] outpatient   - Pt unsure of last CD4 count but was told "it was good."   - CD 4 count 339   - Continue home medications     Hospital Medicine Progress 7/12/18    History of Opportunistic Infection      Cancer  Pneumocystis Pneumonia (PCP)  Cytomegalovirus (CMV)  Kaposi Sarcoma      HIV-Related Conditions (e.g. Dementia, Encephalopathy) documented      x Medications  Current Outpatient Prescriptions on File Prior to Encounter     emtricitabine-tenofovir 200-300 mg (TRUVADA) 200-300 mg Tab Take 1 tablet by mouth once daily.   [DISCONTINUED] atazanavir (REYATAZ) 300 MG Cap Take 400 mg by mouth daily with breakfast.   [DISCONTINUED] ritonavir (NORVIR) 100 mg Cap       Hospital Medicine H&P 7/7/18    Other       Please clarify the patients HIV status  Per CDC publication Vol 60 RR-17 https://www.cdc.gov/mmwr/preview/mmwrhtml/hb7632s5.htmRelating to the classification HIV Infection, once a " patient is diagnosed with AIDS the diagnosis still stands even if, after treatment, the CD4+ T cell count rises to above 200 per ìL of blood or other AIDS-defining illnesses are cured.       The following are AIDS-Defining Illnesses or HIV-Related Diseases.  Bacterial infections, multiple or recurrent only in children aged <6 years Kaposi sarcoma   Candidiasis of bronchi, trachea, or lungs Lymphoma, Burkitt (or equivalent term)   Candidiasis of esophagus Lymphoma, immunoblastic (or equivalent term)   Cervical cancer, invasive Only among adults, adolescents, and children aged > 6 years. Lymphoma, primary, of brain   Coccidioidomycosis, disseminated or extrapulmonary Mycobacterium avium complex or Mycobacterium kansasii, disseminated or extrapulmonary   Cryptococcosis, extrapulmonary Mycobacterium tuberculosis of any site, pulmonary, disseminated, or extrapulmonary   Cryptosporidiosis, chronic intestinal (>1 months duration) Mycobacterium, other species or unidentified species, disseminated or extrapulmonary   Cytomegalovirus disease (other than liver, spleen, or nodes), onset at age >1 month Pneumocystis jirovecii (previously known as Pneumocystis carinii) pneumonia   Cytomegalovirus retinitis (with loss of vision) Pneumonia, recurrent Only among adults, adolescents, and children aged .6 years.   Encephalopathy attributed to HIV Progressive multifocal leukoencephalopathy   Herpes simplex: chronic ulcers (>1 months duration) or bronchitis, pneumonitis, or esophagitis (onset at age >1 month) Salmonella septicemia, recurrent   Histoplasmosis, disseminated or extrapulmonary Toxoplasmosis of brain, onset at age >1 month   Isosporiasis, chronic intestinal (>1 months duration) Wasting syndrome attributed to HIV     [x ] Asymptomatic HIV Infection - Positive Status Only - without any history of (or current) AIDS-Defining Illness or HIV-Related Illness    [ ] HIV Disease / AIDS - Meets the current CDC Definition of  AIDS: HIV-infected persons who have less than 200 CD4+ T-lymphocytes/uL, or CD4+ T-lymphocyte percentage of total lymphocytes of less than 14, and/or an AIDS-Defining Illness or HIV-Related Disease (see above).    [ ] Other (please specify): ____________________________________  [ ] Clinically Undetermined    Please document in your progress notes daily for the duration of treatment until resolved and include in your discharge summary.

## 2018-07-11 NOTE — CONSULTS
Inpatient consult to General Surgery  Consult performed by: SHAYY TRINIDAD  Consult ordered by: ASHLEY DAI  Reason for consult: Pelvic abscess  Assessment/Recommendations: Recommend drainage catheters.  Will perform in CT ASAP. Thank you for the consult.

## 2018-07-11 NOTE — ASSESSMENT & PLAN NOTE
- Blood cultures with gram negative bacteremia, BACTEROIDES VULGATUS. Normally susceptible to Zosyn or Flagyl  - Cipro/Flagyl discontinued today due to CT findings. Pt had a total of 4 days of Flagyl before discontinuation   - Start IV Zosyn  - Repeat blood cultures with NGTD

## 2018-07-11 NOTE — INTERVAL H&P NOTE
The patient has been examined and the H&P has been reviewed:    I concur with the findings and no changes have occurred since H&P was written.        Active Hospital Problems    Diagnosis  POA    *Diverticulitis of large intestine with abscess without bleeding [K57.20]  Yes    Gram-negative bacteremia [R78.81]  Yes    Hypokalemia [E87.6]  Yes    Tobacco abuse [Z72.0]  Yes    HIV (human immunodeficiency virus infection) [B20]  Yes      Resolved Hospital Problems    Diagnosis Date Resolved POA    Lactic acidosis [E87.2] 07/10/2018 Yes    Slow transit constipation [K59.01] 07/10/2018 Yes

## 2018-07-11 NOTE — SUBJECTIVE & OBJECTIVE
Review of Systems   Constitutional: Positive for activity change and fever.   HENT: Negative.    Eyes: Negative.    Respiratory: Negative for apnea, cough, choking, chest tightness, shortness of breath, wheezing and stridor.    Cardiovascular: Negative for chest pain, palpitations and leg swelling.   Gastrointestinal: Positive for abdominal distention, abdominal pain and diarrhea. Negative for constipation, nausea and vomiting.   Endocrine: Negative.    Genitourinary: Negative.    Musculoskeletal: Negative.    Skin: Negative.    Allergic/Immunologic: Negative.    Neurological: Negative for dizziness, tremors, seizures, syncope, facial asymmetry, speech difficulty, weakness, light-headedness, numbness and headaches.   Hematological: Negative.    Psychiatric/Behavioral: Negative.      Objective:     Vital Signs (Most Recent):  Temp: 98.4 °F (36.9 °C) (07/11/18 1135)  Pulse: 92 (07/11/18 1555)  Resp: 20 (07/11/18 1555)  BP: 121/62 (07/11/18 1555)  SpO2: 95 % (07/11/18 1555) Vital Signs (24h Range):  Temp:  [98.4 °F (36.9 °C)-99.8 °F (37.7 °C)] 98.4 °F (36.9 °C)  Pulse:  [90-97] 92  Resp:  [16-24] 20  SpO2:  [95 %-98 %] 95 %  BP: (121-148)/(62-71) 121/62     Weight: 63 kg (138 lb 14.2 oz)  Body mass index is 23.11 kg/m².    Intake/Output Summary (Last 24 hours) at 07/11/18 1600  Last data filed at 07/11/18 1320   Gross per 24 hour   Intake          5366.68 ml   Output                0 ml   Net          5366.68 ml      Physical Exam   Constitutional: She is oriented to person, place, and time. She appears well-developed. She is cooperative. She is easily aroused. No distress.   HENT:   Head: Normocephalic and atraumatic.   Eyes: EOM are normal.   Neck: Normal range of motion. Neck supple.   Cardiovascular: Normal rate, regular rhythm, normal heart sounds and intact distal pulses.    No murmur heard.  Pulmonary/Chest: Effort normal and breath sounds normal. No respiratory distress. She has no wheezes. She has no rales.  She exhibits no tenderness.   Abdominal: Soft. Bowel sounds are normal. She exhibits distension (mild). There is tenderness in the right lower quadrant. There is rebound. There is no rigidity, no guarding and no CVA tenderness.   Genitourinary:   Genitourinary Comments: Deferred   Musculoskeletal: Normal range of motion.   Neurological: She is alert, oriented to person, place, and time and easily aroused. No sensory deficit.   Skin: Skin is warm and dry. Capillary refill takes less than 2 seconds.   Psychiatric: She has a normal mood and affect. Her behavior is normal. Judgment and thought content normal.   Nursing note and vitals reviewed.      Significant Labs:   CBC:   Recent Labs  Lab 07/10/18  0418 07/11/18  0437   WBC 9.89 7.88   HGB 9.5* 9.0*   HCT 28.0* 26.6*    178     CMP:   Recent Labs  Lab 07/10/18  0418 07/11/18  0437   * 137   K 3.2* 3.5    110   CO2 20* 19*   * 110   BUN 9 5*   CREATININE 0.7 0.6   CALCIUM 9.1 9.4   PROT 5.8* 5.9*   ALBUMIN 2.4* 2.4*   BILITOT 1.8* 1.4*   ALKPHOS 126 119   AST 23 24   ALT 13 10   ANIONGAP 7* 8   EGFRNONAA >60 >60       Significant Imaging:   Imaging Results          CT Abdomen Pelvis With Contrast (Final result)  Result time 07/07/18 11:30:11    Final result by Arpan Del Castillo MD (07/07/18 11:30:11)                 Impression:      1.  Significant thickening of the mid sigmoid colon associated with diverticula, with surrounding edema.  Associated ascites in the cul-de-sac and around the liver.  Findings are concerning for uncomplicated diverticulitis of the sigmoid colon.  Negative for free air or focal drainable fluid collection.    2. Negative for acute process involving the abdomen or pelvis otherwise.  Normal appendix.  Negative for renal stone disease or hydronephrosis.    3.  Moderate to severe multifactorial spinal canal stenosis at L4/L5 and L5/S1.  Clinical correlation is advised.    4.  Avascular necrosis changes to the left greater  than right hip joints.  Clinical correlation is advised.    5.  Cholelithiasis.  Small fat filled umbilical hernia.  Other nonemergent findings as described above.    All CT scans at this facility used dose modulation, iterative reconstruction, and/or weight based dosing when appropriate to reduce radiation dose to as low as reasonably achievable.      Electronically signed by: Arpan Del Castillo MD  Date:    07/07/2018  Time:    11:30             Narrative:    EXAMINATION:  CT ABDOMEN PELVIS WITH CONTRAST    CLINICAL HISTORY:  Lower abdominal pain;    TECHNIQUE:  Axial images through the abdomen and pelvis were obtained with the use of IV contrast.  Sagittal and coronal reconstructions are provided for review.  Oral contrast was not utilized.    COMPARISON:  None    FINDINGS:  No comparison studies are available.    LUNG BASES:   Lung bases are clear.  Negative for pleural or pericardial effusions. The distal esophagus is normal.    ABDOMEN: Gallstones.  The liver, spleen and gallbladder otherwise appear normal.  The pancreas is unremarkable.  Kidneys and adrenal glands are normal.    Negative for adenopathy noted within the abdomen or pelvis.  Vascular calcifications are present without aneurysmal changes. Portal vein is patent.    The stomach and small bowel appear normal.  There is significant edema within sigmoid colon with associated diverticula.  With moderate surrounding edema.  Mild to moderate ascites in the cul-de-sac and mild ascites around the liver.  The rest of the colon appears normal.  A normal appendix.    PELVIS: The urinary bladder is unremarkable.    The female pelvic organs are normal. There are pelvic phleboliths.    No significant osseous abnormality is identified.  Broad-based disc bulge at L4/L5 and L5/S1 with moderate to severe multifactorial spinal canal stenosis.  There are significant degenerative changes a changes of the femoral heads.  Osteopenia.    Negative for groin adenopathy.    Tiny  fat filled umbilical hernia.  The abdominal wall is otherwise intact.                               X-Ray Chest AP Portable (Final result)  Result time 07/07/18 10:18:44    Final result by Arpan Del Castillo MD (07/07/18 10:18:44)                 Impression:      1.  Negative for acute process involving the chest, considering there are lower lung volumes on today's study.    2.  Stable findings as noted above.      Electronically signed by: Arpan Del Castillo MD  Date:    07/07/2018  Time:    10:18             Narrative:    EXAMINATION:  XR CHEST AP PORTABLE    CLINICAL HISTORY:  Sepsis;    COMPARISON:  2 June 2014    FINDINGS:  EKG leads overlie the chest.  Mildly low lung volumes.  The lungs are otherwise clear.  The cardiac silhouette size is normal. The trachea is midline and the mediastinal width is normal. Negative for focal infiltrate, effusion or pneumothorax. Pulmonary vasculature is normal. Negative for osseous abnormalities. Tortuous aorta.  Convex left curvature of the lower thoracic spine.  Stable calcifications within the central portions of both humeri, possibly bone infarctions.

## 2018-07-12 LAB
ALBUMIN SERPL BCP-MCNC: 2.3 G/DL
ALP SERPL-CCNC: 111 U/L
ALT SERPL W/O P-5'-P-CCNC: 8 U/L
ANION GAP SERPL CALC-SCNC: 7 MMOL/L
AST SERPL-CCNC: 24 U/L
BASOPHILS # BLD AUTO: 0.01 K/UL
BASOPHILS NFR BLD: 0.1 %
BILIRUB SERPL-MCNC: 1.3 MG/DL
BUN SERPL-MCNC: 5 MG/DL
CALCIUM SERPL-MCNC: 8.9 MG/DL
CD3+CD4+ CELLS # BLD: 339 CELLS/UL (ref 300–1400)
CD3+CD4+ CELLS NFR BLD: 31.2 % (ref 28–57)
CHLORIDE SERPL-SCNC: 109 MMOL/L
CO2 SERPL-SCNC: 20 MMOL/L
CREAT SERPL-MCNC: 0.7 MG/DL
DIFFERENTIAL METHOD: ABNORMAL
EOSINOPHIL # BLD AUTO: 0 K/UL
EOSINOPHIL NFR BLD: 0.5 %
ERYTHROCYTE [DISTWIDTH] IN BLOOD BY AUTOMATED COUNT: 12.9 %
EST. GFR  (AFRICAN AMERICAN): >60 ML/MIN/1.73 M^2
EST. GFR  (NON AFRICAN AMERICAN): >60 ML/MIN/1.73 M^2
GLUCOSE SERPL-MCNC: 77 MG/DL
GRAM STN SPEC: NORMAL
HCT VFR BLD AUTO: 24.9 %
HGB BLD-MCNC: 8.3 G/DL
LYMPHOCYTES # BLD AUTO: 1.8 K/UL
LYMPHOCYTES NFR BLD: 22.2 %
MCH RBC QN AUTO: 30.9 PG
MCHC RBC AUTO-ENTMCNC: 33.3 G/DL
MCV RBC AUTO: 93 FL
MONOCYTES # BLD AUTO: 1.6 K/UL
MONOCYTES NFR BLD: 19.5 %
NEUTROPHILS # BLD AUTO: 4.7 K/UL
NEUTROPHILS NFR BLD: 58.8 %
PLATELET # BLD AUTO: 183 K/UL
PMV BLD AUTO: 9.4 FL
POTASSIUM SERPL-SCNC: 3.1 MMOL/L
PROT SERPL-MCNC: 5.5 G/DL
RBC # BLD AUTO: 2.69 M/UL
SODIUM SERPL-SCNC: 136 MMOL/L
WBC # BLD AUTO: 8.11 K/UL

## 2018-07-12 PROCEDURE — 36415 COLL VENOUS BLD VENIPUNCTURE: CPT

## 2018-07-12 PROCEDURE — 99233 SBSQ HOSP IP/OBS HIGH 50: CPT | Mod: ,,, | Performed by: SURGERY

## 2018-07-12 PROCEDURE — 80053 COMPREHEN METABOLIC PANEL: CPT

## 2018-07-12 PROCEDURE — 97116 GAIT TRAINING THERAPY: CPT

## 2018-07-12 PROCEDURE — 25000003 PHARM REV CODE 250: Performed by: NURSE PRACTITIONER

## 2018-07-12 PROCEDURE — 11000001 HC ACUTE MED/SURG PRIVATE ROOM

## 2018-07-12 PROCEDURE — 25000003 PHARM REV CODE 250: Performed by: INTERNAL MEDICINE

## 2018-07-12 PROCEDURE — 63600175 PHARM REV CODE 636 W HCPCS: Performed by: INTERNAL MEDICINE

## 2018-07-12 PROCEDURE — 85025 COMPLETE CBC W/AUTO DIFF WBC: CPT

## 2018-07-12 PROCEDURE — 97110 THERAPEUTIC EXERCISES: CPT

## 2018-07-12 PROCEDURE — 25000003 PHARM REV CODE 250: Performed by: SURGERY

## 2018-07-12 PROCEDURE — 63600175 PHARM REV CODE 636 W HCPCS: Performed by: NURSE PRACTITIONER

## 2018-07-12 RX ORDER — BISACODYL 5 MG
10 TABLET, DELAYED RELEASE (ENTERIC COATED) ORAL ONCE
Status: COMPLETED | OUTPATIENT
Start: 2018-07-12 | End: 2018-07-12

## 2018-07-12 RX ORDER — ACETAMINOPHEN 325 MG/1
650 TABLET ORAL EVERY 4 HOURS PRN
Status: DISCONTINUED | OUTPATIENT
Start: 2018-07-12 | End: 2018-07-12

## 2018-07-12 RX ORDER — MORPHINE SULFATE 2 MG/ML
2 INJECTION, SOLUTION INTRAMUSCULAR; INTRAVENOUS EVERY 6 HOURS PRN
Status: DISCONTINUED | OUTPATIENT
Start: 2018-07-12 | End: 2018-07-17 | Stop reason: HOSPADM

## 2018-07-12 RX ORDER — HYDROCODONE BITARTRATE AND ACETAMINOPHEN 10; 325 MG/1; MG/1
1 TABLET ORAL EVERY 6 HOURS PRN
Status: DISCONTINUED | OUTPATIENT
Start: 2018-07-12 | End: 2018-07-17 | Stop reason: HOSPADM

## 2018-07-12 RX ORDER — ACETAMINOPHEN 325 MG/1
650 TABLET ORAL EVERY 6 HOURS PRN
Status: DISCONTINUED | OUTPATIENT
Start: 2018-07-12 | End: 2018-07-17 | Stop reason: HOSPADM

## 2018-07-12 RX ORDER — POTASSIUM CHLORIDE 750 MG/1
30 TABLET, EXTENDED RELEASE ORAL ONCE
Status: COMPLETED | OUTPATIENT
Start: 2018-07-12 | End: 2018-07-12

## 2018-07-12 RX ADMIN — CYPROHEPTADINE HYDROCHLORIDE 4 MG: 4 TABLET ORAL at 04:07

## 2018-07-12 RX ADMIN — FERROUS GLUCONATE TAB 324 MG (37.5 MG ELEMENTAL IRON) 324 MG: 324 (37.5 FE) TAB at 08:07

## 2018-07-12 RX ADMIN — PIPERACILLIN SODIUM AND TAZOBACTAM SODIUM 4.5 G: 4; .5 INJECTION, POWDER, LYOPHILIZED, FOR SOLUTION INTRAVENOUS at 11:07

## 2018-07-12 RX ADMIN — ACETAMINOPHEN 650 MG: 325 TABLET, FILM COATED ORAL at 08:07

## 2018-07-12 RX ADMIN — BISACODYL 10 MG: 5 TABLET, COATED ORAL at 06:07

## 2018-07-12 RX ADMIN — VANCOMYCIN HYDROCHLORIDE 1250 MG: 10 INJECTION, POWDER, LYOPHILIZED, FOR SOLUTION INTRAVENOUS at 06:07

## 2018-07-12 RX ADMIN — CYPROHEPTADINE HYDROCHLORIDE 4 MG: 4 TABLET ORAL at 08:07

## 2018-07-12 RX ADMIN — POTASSIUM CHLORIDE 30 MEQ: 750 TABLET, FILM COATED, EXTENDED RELEASE ORAL at 11:07

## 2018-07-12 RX ADMIN — ROSUVASTATIN CALCIUM 20 MG: 10 TABLET, FILM COATED ORAL at 08:07

## 2018-07-12 RX ADMIN — STANDARDIZED SENNA CONCENTRATE AND DOCUSATE SODIUM 2 TABLET: 8.6; 5 TABLET, FILM COATED ORAL at 08:07

## 2018-07-12 RX ADMIN — PIPERACILLIN SODIUM AND TAZOBACTAM SODIUM 4.5 G: 4; .5 INJECTION, POWDER, LYOPHILIZED, FOR SOLUTION INTRAVENOUS at 03:07

## 2018-07-12 RX ADMIN — BUPROPION HYDROCHLORIDE 150 MG: 150 TABLET, FILM COATED, EXTENDED RELEASE ORAL at 08:07

## 2018-07-12 RX ADMIN — HYDROCODONE BITARTRATE AND ACETAMINOPHEN 1 TABLET: 10; 325 TABLET ORAL at 09:07

## 2018-07-12 RX ADMIN — PIPERACILLIN SODIUM AND TAZOBACTAM SODIUM 4.5 G: 4; .5 INJECTION, POWDER, LYOPHILIZED, FOR SOLUTION INTRAVENOUS at 08:07

## 2018-07-12 NOTE — PROGRESS NOTES
[facility] -   General Surgery  Progress Note    Subjective:     History of Present Illness:  Lizz Noguera is a 57 y.o. female patient with a PMHx of HIV, CVA with right sided residual weakness, HLP, depression. She initially presented to the ER for suprapubic abd pain and was found to have acute uncomplicated diverticulitis. She was admitted and treated with IV antibiotics. She initially improved, however yesterday she had a fever of 101 and increasing abdominal tenderness and distention. A CT was repeated and is concerning for perforated diverticulitis with abscess. General Surgery was consulted.    Post-Op Info:  * No surgery found *         Interval History: tolerated her drainage procedure    Medications:  Continuous Infusions:   sodium chloride 0.9% 100 mL/hr at 07/10/18 2021     Scheduled Meds:   buPROPion  150 mg Oral BID    cyproheptadine  4 mg Oral TID    darunavir-cobicistat  1 tablet Oral Daily    ferrous gluconate  324 mg Oral Daily    piperacillin-tazobactam (ZOSYN) IVPB  4.5 g Intravenous Q8H    rosuvastatin  20 mg Oral Daily    senna-docusate 8.6-50 mg  2 tablet Oral QHS     PRN Meds:acetaminophen, dextrose 50%, dextrose 50%, fentaNYL, glucagon (human recombinant), glucose, glucose, ibuprofen, insulin aspart U-100, midazolam, ondansetron, sodium chloride 0.9%     Review of patient's allergies indicates:   Allergen Reactions    Bactrim [sulfamethoxazole-trimethoprim] Rash     Objective:     Vital Signs (Most Recent):  Temp: 99.7 °F (37.6 °C) (07/12/18 0738)  Pulse: 92 (07/12/18 0738)  Resp: (!) 28 (07/12/18 0738)  BP: (!) 159/73 (07/12/18 0738)  SpO2: 97 % (07/12/18 0738) Vital Signs (24h Range):  Temp:  [98.4 °F (36.9 °C)-101.2 °F (38.4 °C)] 99.7 °F (37.6 °C)  Pulse:  [] 92  Resp:  [16-28] 28  SpO2:  [94 %-98 %] 97 %  BP: (121-159)/(61-73) 159/73     Weight: 63 kg (138 lb 14.2 oz)  Body mass index is 23.11 kg/m².    Intake/Output - Last 3 Shifts       07/10 0700 - 07/11  0659 07/11 0700 - 07/12 0659 07/12 0700 - 07/13 0659    P.O. 1720 480     I.V. (mL/kg) 2230 (35.4) 2716.7 (43.1)     IV Piggyback 700 300     Total Intake(mL/kg) 4650 (73.8) 3496.7 (55.5)     Drains  220.5     Total Output   220.5      Net +4650 +3276.2             Urine Occurrence 4 x 5 x     Stool Occurrence 1 x 0 x           Physical Exam   Constitutional: She is oriented to person, place, and time. She appears well-developed and well-nourished.   Eyes: Pupils are equal, round, and reactive to light.   Neck: Normal range of motion.   Cardiovascular: Normal rate, regular rhythm and normal heart sounds.    Pulmonary/Chest: Effort normal and breath sounds normal.   Abdominal: Soft. Bowel sounds are normal. There is no tenderness (mild suprapubic). There is no guarding.   Drains are clear   Neurological: She is alert and oriented to person, place, and time.   Skin: Skin is warm and dry. Capillary refill takes less than 2 seconds.   Psychiatric: She has a normal mood and affect. Her behavior is normal. Judgment and thought content normal.   Nursing note and vitals reviewed.      Significant Labs:  CBC:   Recent Labs  Lab 07/12/18  0454   WBC 8.11   RBC 2.69*   HGB 8.3*   HCT 24.9*      MCV 93   MCH 30.9   MCHC 33.3     BMP:   Recent Labs  Lab 07/10/18  0418  07/12/18  0454   *  < > 77   *  < > 136   K 3.2*  < > 3.1*     < > 109   CO2 20*  < > 20*   BUN 9  < > 5*   CREATININE 0.7  < > 0.7   CALCIUM 9.1  < > 8.9   MG 2.1  --   --    < > = values in this interval not displayed.  CMP:   Recent Labs  Lab 07/12/18  0454   GLU 77   CALCIUM 8.9   ALBUMIN 2.3*   PROT 5.5*      K 3.1*   CO2 20*      BUN 5*   CREATININE 0.7   ALKPHOS 111   ALT 8*   AST 24   BILITOT 1.3*       Significant Diagnostics:  ct drainage reviewed    Assessment/Plan:     * Diverticulitis of large intestine with abscess without bleeding    CT drainage completed  Wait cultures  Continue antibiotics    Look into home  [facility] for drain care    Repeat ct in 5 to 7 days to check on the abscess            Qasim Gleason MD  General Surgery  [facility]

## 2018-07-12 NOTE — CONSULTS
Consult received for home health.  CM met with patient at the bedside.  Right care listing provided.  Patient would like to use [facility].  Choice form completed and placed in patient blue folder.  Referral made via right care to [facility].

## 2018-07-12 NOTE — ASSESSMENT & PLAN NOTE
CT drainage completed  Wait cultures  Continue antibiotics    Look into home health for drain care    Repeat ct in 5 to 7 days to check on the abscess

## 2018-07-12 NOTE — PT/OT/SLP PROGRESS
Physical Therapy  Treatment    Lizz Noguera   MRN: [id number]   Admitting Diagnosis: Diverticulitis of large intestine with abscess without bleeding    PT Received On: 07/12/18  PT Start Time: 1035     PT Stop Time: 1100    PT Total Time (min): 25 min       Billable Minutes:  Gait Training 15 and Therapeutic Exercise 10    Treatment Type: Treatment  PT/PTA: PT             General Precautions: Standard, fall  Orthopedic Precautions: N/A   Braces: N/A         Subjective:  Communicated with NURSE AND EPIC CHART REVIEW prior to session.   PT AGREED TO TX     Pain/Comfort  Pain Rating 1: 2/10  Location 1: abdomen  Pain Addressed 1: Pre-medicate for activity  Pain Rating Post-Intervention 1: 2/10    Objective:   Patient found with: peripheral IV    Functional Mobility:  PT SUP.SIT EOB IND. PT STOOD WITH NO AD AND CGA FOR GT X 240' WITH DEC WB ON R LE WITH GT D/T WEAKNESS. PT RETURNED TO RM T/F TO EOB AND COMPLETED B LE TE X 10 REPS OF AP, TKE, AND MIP.    AM-PAC 6 CLICK MOBILITY  How much help from another person does this patient currently need?   1 = Unable, Total/Dependent Assistance  2 = A lot, Maximum/Moderate Assistance  3 = A little, Minimum/Contact Guard/Supervision  4 = None, Modified Accident/Independent    Turning over in bed (including adjusting bedclothes, sheets and blankets)?: 4  Sitting down on and standing up from a chair with arms (e.g., wheelchair, bedside commode, etc.): 4  Moving from lying on back to sitting on the side of the bed?: 4  Moving to and from a bed to a chair (including a wheelchair)?: 3  Need to walk in hospital room?: 3  Climbing 3-5 steps with a railing?: 1  Basic Mobility Total Score: 19    AM-PAC Raw Score CMS G-Code Modifier Level of Impairment Assistance   6 % Total / Unable   7 - 9 CM 80 - 100% Maximal Assist   10 - 14 CL 60 - 80% Moderate Assist   15 - 19 CK 40 - 60% Moderate Assist   20 - 22 CJ 20 - 40% Minimal Assist   23 CI 1-20% SBA / CGA   24 CH 0% Independent/  Mod I     Patient left supine with call button in reach.    Assessment:  PT LAURY TX WITH INC ENCOURAGEMENT.    Rehab identified problem list/impairments: Rehab identified problem list/impairments: weakness, impaired endurance, impaired functional mobilty, gait instability, decreased lower extremity function, impaired balance    Rehab potential is good.    Activity tolerance: Good    Discharge recommendations: Discharge Facility/Level Of Care Needs: home     Barriers to discharge:      Equipment recommendations: Equipment Needed After Discharge: none     GOALS:    Physical Therapy Goals        Problem: Physical Therapy Goal    Goal Priority Disciplines Outcome Goal Variances Interventions   Physical Therapy Goal     PT/OT, PT Ongoing (interventions implemented as appropriate)     Description:  ltg's for PT by 7/15/18  1. Patient will perform sit to stand indep  2. Patient will amb mod indep x 300ft no lob  3. Patient will perform stand b le exs x 20 reps                     PLAN:    Patient to be seen 5 x/week  to address the above listed problems via gait training, therapeutic activities, therapeutic exercises  Plan of Care expires: 07/15/18  Plan of Care reviewed with: patient         Angela Moon, PT  07/12/2018

## 2018-07-12 NOTE — ASSESSMENT & PLAN NOTE
- Blood cultures with gram negative bacteremia, BACTEROIDES VULGATUS. Normally susceptible to Zosyn or Flagyl  - Cipro/Flagyl discontinued today due to CT findings. Pt had a total of 4 days of Flagyl before discontinuation   - Continue IV Zosyn  - Repeat blood cultures with NGTD -- appears to have resolved

## 2018-07-12 NOTE — PLAN OF CARE
Problem: Physical Therapy Goal  Goal: Physical Therapy Goal  ltg's for PT by 7/15/18  1. Patient will perform sit to stand indep  2. Patient will amb mod indep x 300ft no lob  3. Patient will perform stand b le exs x 20 reps    Outcome: Ongoing (interventions implemented as appropriate)  PT GT TRAINED X X 240' WITH CGA AND NO AD

## 2018-07-12 NOTE — ASSESSMENT & PLAN NOTE
- Initial CT scan upon admit showed diverticulitis without abscess. Pt was treated for diverticulitis initially with IV Cipro and Flagyl  - Blood cultures with gram negative bacteremia, BACTEROIDES VULGATUS. Normally susceptible to Flagyl and Zosyn  - Repeat blood cultures with NGTD  - Due to fever (101.4) and abdominal distension yesterday, 07/10/18, obtained CT chest/abdomen/pelvis with contrast which showed anterior pelvic abscess measuring 8.5 cm with several foci of free air adjacent to the sigmoid colon in the mesentery and liver likely from perforated diverticulum  - General surgery consulted and recommended ct guided abscess drainage -- to be performed by IR -- JOSE drains x 2. Cultures pending, follow  - Continue Zosyn

## 2018-07-12 NOTE — SUBJECTIVE & OBJECTIVE
Review of Systems   Constitutional: Positive for activity change and fever.   HENT: Negative.    Eyes: Negative.    Respiratory: Negative for apnea, cough, choking, chest tightness, shortness of breath, wheezing and stridor.    Cardiovascular: Negative for chest pain, palpitations and leg swelling.   Gastrointestinal: Positive for abdominal distention, abdominal pain and diarrhea. Negative for constipation, nausea and vomiting.   Endocrine: Negative.    Genitourinary: Negative.    Musculoskeletal: Negative.    Skin: Negative.    Allergic/Immunologic: Negative.    Neurological: Negative for dizziness, tremors, seizures, syncope, facial asymmetry, speech difficulty, weakness, light-headedness, numbness and headaches.   Hematological: Negative.    Psychiatric/Behavioral: Negative.      Objective:     Vital Signs (Most Recent):  Temp: 99.9 °F (37.7 °C) (07/12/18 1549)  Pulse: 99 (07/12/18 1549)  Resp: 16 (07/12/18 1549)  BP: 137/66 (07/12/18 1549)  SpO2: 96 % (07/12/18 1549) Vital Signs (24h Range):  Temp:  [99 °F (37.2 °C)-101.2 °F (38.4 °C)] 99.9 °F (37.7 °C)  Pulse:  [] 99  Resp:  [16-28] 16  SpO2:  [94 %-98 %] 96 %  BP: (135-159)/(63-73) 137/66     Weight: 63 kg (138 lb 14.2 oz)  Body mass index is 23.11 kg/m².    Intake/Output Summary (Last 24 hours) at 07/12/18 1735  Last data filed at 07/12/18 1532   Gross per 24 hour   Intake          3076.67 ml   Output            217.5 ml   Net          2859.17 ml      Physical Exam   Constitutional: She is oriented to person, place, and time. She appears well-developed. She is cooperative. She is easily aroused. No distress.   HENT:   Head: Normocephalic and atraumatic.   Eyes: EOM are normal.   Neck: Normal range of motion. Neck supple.   Cardiovascular: Normal rate, regular rhythm, normal heart sounds and intact distal pulses.    No murmur heard.  Pulmonary/Chest: Effort normal and breath sounds normal. No respiratory distress. She has no wheezes. She has no rales.  She exhibits no tenderness.   Abdominal: Soft. Bowel sounds are normal. She exhibits no distension. There is no tenderness. There is no rigidity, no rebound, no guarding and no CVA tenderness.   Abdominal dressing C/D/I  JOSE drains x 2 -- clear yellow output   Genitourinary:   Genitourinary Comments: Deferred   Musculoskeletal: Normal range of motion.   Neurological: She is alert, oriented to person, place, and time and easily aroused. No sensory deficit.   Skin: Skin is warm and dry. Capillary refill takes less than 2 seconds.   Psychiatric: She has a normal mood and affect. Her behavior is normal. Judgment and thought content normal.   Nursing note and vitals reviewed.       Significant Labs:   CBC:   Recent Labs  Lab 07/11/18 0437 07/12/18 0454   WBC 7.88 8.11   HGB 9.0* 8.3*   HCT 26.6* 24.9*    183     CMP:   Recent Labs  Lab 07/11/18 0437 07/12/18 0454    136   K 3.5 3.1*    109   CO2 19* 20*    77   BUN 5* 5*   CREATININE 0.6 0.7   CALCIUM 9.4 8.9   PROT 5.9* 5.5*   ALBUMIN 2.4* 2.3*   BILITOT 1.4* 1.3*   ALKPHOS 119 111   AST 24 24   ALT 10 8*   ANIONGAP 8 7*   EGFRNONAA >60 >60       Significant Imaging:   Imaging Results          CT Abdomen Pelvis With Contrast (Final result)  Result time 07/07/18 11:30:11    Final result by Arpan Del Castillo MD (07/07/18 11:30:11)                 Impression:      1.  Significant thickening of the mid sigmoid colon associated with diverticula, with surrounding edema.  Associated ascites in the cul-de-sac and around the liver.  Findings are concerning for uncomplicated diverticulitis of the sigmoid colon.  Negative for free air or focal drainable fluid collection.    2. Negative for acute process involving the abdomen or pelvis otherwise.  Normal appendix.  Negative for renal stone disease or hydronephrosis.    3.  Moderate to severe multifactorial spinal canal stenosis at L4/L5 and L5/S1.  Clinical correlation is advised.    4.  Avascular  necrosis changes to the left greater than right hip joints.  Clinical correlation is advised.    5.  Cholelithiasis.  Small fat filled umbilical hernia.  Other nonemergent findings as described above.    All CT scans at this facility used dose modulation, iterative reconstruction, and/or weight based dosing when appropriate to reduce radiation dose to as low as reasonably achievable.      Electronically signed by: Arpan Del Castillo MD  Date:    07/07/2018  Time:    11:30             Narrative:    EXAMINATION:  CT ABDOMEN PELVIS WITH CONTRAST    CLINICAL HISTORY:  Lower abdominal pain;    TECHNIQUE:  Axial images through the abdomen and pelvis were obtained with the use of IV contrast.  Sagittal and coronal reconstructions are provided for review.  Oral contrast was not utilized.    COMPARISON:  None    FINDINGS:  No comparison studies are available.    LUNG BASES:   Lung bases are clear.  Negative for pleural or pericardial effusions. The distal esophagus is normal.    ABDOMEN: Gallstones.  The liver, spleen and gallbladder otherwise appear normal.  The pancreas is unremarkable.  Kidneys and adrenal glands are normal.    Negative for adenopathy noted within the abdomen or pelvis.  Vascular calcifications are present without aneurysmal changes. Portal vein is patent.    The stomach and small bowel appear normal.  There is significant edema within sigmoid colon with associated diverticula.  With moderate surrounding edema.  Mild to moderate ascites in the cul-de-sac and mild ascites around the liver.  The rest of the colon appears normal.  A normal appendix.    PELVIS: The urinary bladder is unremarkable.    The female pelvic organs are normal. There are pelvic phleboliths.    No significant osseous abnormality is identified.  Broad-based disc bulge at L4/L5 and L5/S1 with moderate to severe multifactorial spinal canal stenosis.  There are significant degenerative changes a changes of the femoral heads.   Osteopenia.    Negative for groin adenopathy.    Tiny fat filled umbilical hernia.  The abdominal wall is otherwise intact.                               X-Ray Chest AP Portable (Final result)  Result time 07/07/18 10:18:44    Final result by Arpan Del Castillo MD (07/07/18 10:18:44)                 Impression:      1.  Negative for acute process involving the chest, considering there are lower lung volumes on today's study.    2.  Stable findings as noted above.      Electronically signed by: Arpan Del Castillo MD  Date:    07/07/2018  Time:    10:18             Narrative:    EXAMINATION:  XR CHEST AP PORTABLE    CLINICAL HISTORY:  Sepsis;    COMPARISON:  2 June 2014    FINDINGS:  EKG leads overlie the chest.  Mildly low lung volumes.  The lungs are otherwise clear.  The cardiac silhouette size is normal. The trachea is midline and the mediastinal width is normal. Negative for focal infiltrate, effusion or pneumothorax. Pulmonary vasculature is normal. Negative for osseous abnormalities. Tortuous aorta.  Convex left curvature of the lower thoracic spine.  Stable calcifications within the central portions of both humeri, possibly bone infarctions.

## 2018-07-12 NOTE — PLAN OF CARE
Problem: Patient Care Overview  Goal: Plan of Care Review  Outcome: Ongoing (interventions implemented as appropriate)  Fall precautions maintained. Pt free from falls/injuries. Pt repositions and ambulates independently. Pt has occasional c/o pain. Pain well controlled with PRN medication. Regular diet maintained and tolerated. Infection control with IV abx. Fluid promotion with PO fluids and IV fluids. Drain care performed. Patient tolerated well. Patient turns every 2 hours. POC and meds reviewed. Patient verbalized understanding. Side rails up x2. Bed Low and locked. Personal items and call light within reach. No signs/symptoms of acute distress. Chart check done. Will monitor

## 2018-07-12 NOTE — ASSESSMENT & PLAN NOTE
"- Sees Dr. Doe at [facility] outpatient  - Pt unsure of last CD4 count but was told "it was good."  - Continue home medications    "

## 2018-07-12 NOTE — PROGRESS NOTES
ABDOMINAL FILMS REVIEWED.  DILATED LOOPS OF SMALL BOWEL. THIS COULD BE RELATED TO BOWEL DYSFUNCTION SECONDARY TO THE DIVERTICULAR ABSCESS.    PATIENT CAN GO TO A CLEAR LIQUID DIET I WOULD STOP REGULAR FOOD AT THIS POINT.  DULCOLAX 10:00 A.M. X-RAY

## 2018-07-12 NOTE — PLAN OF CARE
Problem: Patient Care Overview  Goal: Plan of Care Review  Outcome: Ongoing (interventions implemented as appropriate)  Pt remained free of injury during shift, stable condition, denied pain, reports abdominal fullness, tolerating diet, dressing to JOSE drains CDI with purulent yellow drainage, no acute distress, receiving IV fluids, receiving antibiotics, and will continue to monitor. 24hr chart review performed.

## 2018-07-12 NOTE — SUBJECTIVE & OBJECTIVE
Interval History: tolerated her drainage procedure    Medications:  Continuous Infusions:   sodium chloride 0.9% 100 mL/hr at 07/10/18 2021     Scheduled Meds:   buPROPion  150 mg Oral BID    cyproheptadine  4 mg Oral TID    darunavir-cobicistat  1 tablet Oral Daily    ferrous gluconate  324 mg Oral Daily    piperacillin-tazobactam (ZOSYN) IVPB  4.5 g Intravenous Q8H    rosuvastatin  20 mg Oral Daily    senna-docusate 8.6-50 mg  2 tablet Oral QHS     PRN Meds:acetaminophen, dextrose 50%, dextrose 50%, fentaNYL, glucagon (human recombinant), glucose, glucose, ibuprofen, insulin aspart U-100, midazolam, ondansetron, sodium chloride 0.9%     Review of patient's allergies indicates:   Allergen Reactions    Bactrim [sulfamethoxazole-trimethoprim] Rash     Objective:     Vital Signs (Most Recent):  Temp: 99.7 °F (37.6 °C) (07/12/18 0738)  Pulse: 92 (07/12/18 0738)  Resp: (!) 28 (07/12/18 0738)  BP: (!) 159/73 (07/12/18 0738)  SpO2: 97 % (07/12/18 0738) Vital Signs (24h Range):  Temp:  [98.4 °F (36.9 °C)-101.2 °F (38.4 °C)] 99.7 °F (37.6 °C)  Pulse:  [] 92  Resp:  [16-28] 28  SpO2:  [94 %-98 %] 97 %  BP: (121-159)/(61-73) 159/73     Weight: 63 kg (138 lb 14.2 oz)  Body mass index is 23.11 kg/m².    Intake/Output - Last 3 Shifts       07/10 0700 - 07/11 0659 07/11 0700 - 07/12 0659 07/12 0700 - 07/13 0659    P.O. 1720 480     I.V. (mL/kg) 2230 (35.4) 2716.7 (43.1)     IV Piggyback 700 300     Total Intake(mL/kg) 4650 (73.8) 3496.7 (55.5)     Drains  220.5     Total Output   220.5      Net +4650 +3276.2             Urine Occurrence 4 x 5 x     Stool Occurrence 1 x 0 x           Physical Exam   Constitutional: She is oriented to person, place, and time. She appears well-developed and well-nourished.   Eyes: Pupils are equal, round, and reactive to light.   Neck: Normal range of motion.   Cardiovascular: Normal rate, regular rhythm and normal heart sounds.    Pulmonary/Chest: Effort normal and breath sounds  normal.   Abdominal: Soft. Bowel sounds are normal. There is no tenderness (mild suprapubic). There is no guarding.   Drains are clear   Neurological: She is alert and oriented to person, place, and time.   Skin: Skin is warm and dry. Capillary refill takes less than 2 seconds.   Psychiatric: She has a normal mood and affect. Her behavior is normal. Judgment and thought content normal.   Nursing note and vitals reviewed.      Significant Labs:  CBC:   Recent Labs  Lab 07/12/18  0454   WBC 8.11   RBC 2.69*   HGB 8.3*   HCT 24.9*      MCV 93   MCH 30.9   MCHC 33.3     BMP:   Recent Labs  Lab 07/10/18  0418  07/12/18  0454   *  < > 77   *  < > 136   K 3.2*  < > 3.1*     < > 109   CO2 20*  < > 20*   BUN 9  < > 5*   CREATININE 0.7  < > 0.7   CALCIUM 9.1  < > 8.9   MG 2.1  --   --    < > = values in this interval not displayed.  CMP:   Recent Labs  Lab 07/12/18  0454   GLU 77   CALCIUM 8.9   ALBUMIN 2.3*   PROT 5.5*      K 3.1*   CO2 20*      BUN 5*   CREATININE 0.7   ALKPHOS 111   ALT 8*   AST 24   BILITOT 1.3*       Significant Diagnostics:  ct drainage reviewed

## 2018-07-12 NOTE — ASSESSMENT & PLAN NOTE
- Due to fever (101.4) and abdominal distension, CT abdomen/pelvis with contrast showed anterior pelvic abscess measuring 8.5 cm with several foci of free air adjacent to the sigmoid colon in the mesentery and liver likely from perforated diverticulum  - General surgery consulted and recommended ct guided abscess drainage  - IR performed abscess drainage on 07/11/18 -- has JOSE drains x 2  - Cultures pending -- follow

## 2018-07-12 NOTE — ASSESSMENT & PLAN NOTE
- CT chest/abdomen/pelvis with contrast showed multilobar infiltrates with small right effusion concerning for pneumonia  - CXR showed no infiltrates and CT abdomen/pelvis with contrast negative for infiltrates at lung bases -- both obtained on admit  - Currently afebrile, no WBC count. Hx of HIV, will obtain CD 4 count. Will continue IV Zosyn for now  - Repeat blood cultures with NGTD     07/12/18  - Max temp of 101.2 overnight. Repeat blood cultures NGTD. Add Vancomycin, pharmacy to dose

## 2018-07-13 PROBLEM — I10 HYPERTENSION: Status: ACTIVE | Noted: 2018-07-13

## 2018-07-13 PROBLEM — R50.9 FEVER: Status: ACTIVE | Noted: 2018-07-13

## 2018-07-13 LAB
ALBUMIN SERPL BCP-MCNC: 2.4 G/DL
ALP SERPL-CCNC: 114 U/L
ALT SERPL W/O P-5'-P-CCNC: 8 U/L
ANION GAP SERPL CALC-SCNC: 7 MMOL/L
AST SERPL-CCNC: 20 U/L
BASOPHILS # BLD AUTO: 0.01 K/UL
BASOPHILS NFR BLD: 0.1 %
BILIRUB SERPL-MCNC: 1.2 MG/DL
BUN SERPL-MCNC: 3 MG/DL
CALCIUM SERPL-MCNC: 9 MG/DL
CHLORIDE SERPL-SCNC: 108 MMOL/L
CO2 SERPL-SCNC: 21 MMOL/L
CREAT SERPL-MCNC: 0.7 MG/DL
DIFFERENTIAL METHOD: ABNORMAL
EOSINOPHIL # BLD AUTO: 0 K/UL
EOSINOPHIL NFR BLD: 0.3 %
ERYTHROCYTE [DISTWIDTH] IN BLOOD BY AUTOMATED COUNT: 13.1 %
EST. GFR  (AFRICAN AMERICAN): >60 ML/MIN/1.73 M^2
EST. GFR  (NON AFRICAN AMERICAN): >60 ML/MIN/1.73 M^2
GLUCOSE SERPL-MCNC: 89 MG/DL
HCT VFR BLD AUTO: 24.4 %
HGB BLD-MCNC: 8.2 G/DL
LYMPHOCYTES # BLD AUTO: 2 K/UL
LYMPHOCYTES NFR BLD: 20.3 %
MCH RBC QN AUTO: 31.2 PG
MCHC RBC AUTO-ENTMCNC: 33.6 G/DL
MCV RBC AUTO: 93 FL
MONOCYTES # BLD AUTO: 1.6 K/UL
MONOCYTES NFR BLD: 16.6 %
NEUTROPHILS # BLD AUTO: 6.2 K/UL
NEUTROPHILS NFR BLD: 64.2 %
PLATELET # BLD AUTO: 241 K/UL
PMV BLD AUTO: 9.6 FL
POTASSIUM SERPL-SCNC: 3 MMOL/L
PROT SERPL-MCNC: 5.8 G/DL
RBC # BLD AUTO: 2.63 M/UL
SODIUM SERPL-SCNC: 136 MMOL/L
WBC # BLD AUTO: 9.82 K/UL

## 2018-07-13 PROCEDURE — 63600175 PHARM REV CODE 636 W HCPCS: Performed by: INTERNAL MEDICINE

## 2018-07-13 PROCEDURE — 25500020 PHARM REV CODE 255: Performed by: NURSE PRACTITIONER

## 2018-07-13 PROCEDURE — 25000003 PHARM REV CODE 250: Performed by: EMERGENCY MEDICINE

## 2018-07-13 PROCEDURE — 99231 SBSQ HOSP IP/OBS SF/LOW 25: CPT | Mod: ,,, | Performed by: PHYSICIAN ASSISTANT

## 2018-07-13 PROCEDURE — 25000003 PHARM REV CODE 250: Performed by: NURSE PRACTITIONER

## 2018-07-13 PROCEDURE — 25000003 PHARM REV CODE 250: Performed by: INTERNAL MEDICINE

## 2018-07-13 PROCEDURE — 85025 COMPLETE CBC W/AUTO DIFF WBC: CPT

## 2018-07-13 PROCEDURE — 36415 COLL VENOUS BLD VENIPUNCTURE: CPT

## 2018-07-13 PROCEDURE — 94761 N-INVAS EAR/PLS OXIMETRY MLT: CPT

## 2018-07-13 PROCEDURE — 25000003 PHARM REV CODE 250: Performed by: SURGERY

## 2018-07-13 PROCEDURE — 25500020 PHARM REV CODE 255: Performed by: INTERNAL MEDICINE

## 2018-07-13 PROCEDURE — 11000001 HC ACUTE MED/SURG PRIVATE ROOM

## 2018-07-13 PROCEDURE — 63600175 PHARM REV CODE 636 W HCPCS: Performed by: NURSE PRACTITIONER

## 2018-07-13 PROCEDURE — 80053 COMPREHEN METABOLIC PANEL: CPT

## 2018-07-13 RX ORDER — IPRATROPIUM BROMIDE AND ALBUTEROL SULFATE 2.5; .5 MG/3ML; MG/3ML
3 SOLUTION RESPIRATORY (INHALATION) EVERY 4 HOURS PRN
Status: DISCONTINUED | OUTPATIENT
Start: 2018-07-13 | End: 2018-07-17 | Stop reason: HOSPADM

## 2018-07-13 RX ORDER — BISACODYL 5 MG
10 TABLET, DELAYED RELEASE (ENTERIC COATED) ORAL ONCE
Status: COMPLETED | OUTPATIENT
Start: 2018-07-13 | End: 2018-07-13

## 2018-07-13 RX ORDER — HEPARIN SODIUM 5000 [USP'U]/ML
5000 INJECTION, SOLUTION INTRAVENOUS; SUBCUTANEOUS EVERY 8 HOURS
Status: DISCONTINUED | OUTPATIENT
Start: 2018-07-13 | End: 2018-07-17 | Stop reason: HOSPADM

## 2018-07-13 RX ORDER — POTASSIUM CHLORIDE 20 MEQ/1
40 TABLET, EXTENDED RELEASE ORAL ONCE
Status: COMPLETED | OUTPATIENT
Start: 2018-07-13 | End: 2018-07-13

## 2018-07-13 RX ORDER — ENALAPRIL MALEATE 10 MG/1
20 TABLET ORAL DAILY
Status: DISCONTINUED | OUTPATIENT
Start: 2018-07-13 | End: 2018-07-17 | Stop reason: HOSPADM

## 2018-07-13 RX ADMIN — POTASSIUM CHLORIDE 40 MEQ: 1500 TABLET, EXTENDED RELEASE ORAL at 08:07

## 2018-07-13 RX ADMIN — HEPARIN SODIUM 5000 UNITS: 5000 INJECTION, SOLUTION INTRAVENOUS; SUBCUTANEOUS at 09:07

## 2018-07-13 RX ADMIN — CYPROHEPTADINE HYDROCHLORIDE 4 MG: 4 TABLET ORAL at 08:07

## 2018-07-13 RX ADMIN — BUPROPION HYDROCHLORIDE 150 MG: 150 TABLET, FILM COATED, EXTENDED RELEASE ORAL at 09:07

## 2018-07-13 RX ADMIN — ACETAMINOPHEN 650 MG: 325 TABLET, FILM COATED ORAL at 08:07

## 2018-07-13 RX ADMIN — SODIUM CHLORIDE: 0.9 INJECTION, SOLUTION INTRAVENOUS at 04:07

## 2018-07-13 RX ADMIN — IOHEXOL 30 ML: 350 INJECTION, SOLUTION INTRAVENOUS at 02:07

## 2018-07-13 RX ADMIN — BISACODYL 10 MG: 5 TABLET, COATED ORAL at 02:07

## 2018-07-13 RX ADMIN — CYPROHEPTADINE HYDROCHLORIDE 4 MG: 4 TABLET ORAL at 02:07

## 2018-07-13 RX ADMIN — CYPROHEPTADINE HYDROCHLORIDE 4 MG: 4 TABLET ORAL at 09:07

## 2018-07-13 RX ADMIN — VANCOMYCIN HYDROCHLORIDE 1000 MG: 1 INJECTION, POWDER, LYOPHILIZED, FOR SOLUTION INTRAVENOUS at 08:07

## 2018-07-13 RX ADMIN — IOHEXOL 75 ML: 350 INJECTION, SOLUTION INTRAVENOUS at 05:07

## 2018-07-13 RX ADMIN — ROSUVASTATIN CALCIUM 20 MG: 10 TABLET, FILM COATED ORAL at 08:07

## 2018-07-13 RX ADMIN — PIPERACILLIN SODIUM AND TAZOBACTAM SODIUM 4.5 G: 4; .5 INJECTION, POWDER, LYOPHILIZED, FOR SOLUTION INTRAVENOUS at 11:07

## 2018-07-13 RX ADMIN — BUPROPION HYDROCHLORIDE 150 MG: 150 TABLET, FILM COATED, EXTENDED RELEASE ORAL at 08:07

## 2018-07-13 RX ADMIN — FERROUS GLUCONATE TAB 324 MG (37.5 MG ELEMENTAL IRON) 324 MG: 324 (37.5 FE) TAB at 08:07

## 2018-07-13 RX ADMIN — PIPERACILLIN SODIUM AND TAZOBACTAM SODIUM 4.5 G: 4; .5 INJECTION, POWDER, LYOPHILIZED, FOR SOLUTION INTRAVENOUS at 07:07

## 2018-07-13 RX ADMIN — HYDROCODONE BITARTRATE AND ACETAMINOPHEN 1 TABLET: 10; 325 TABLET ORAL at 08:07

## 2018-07-13 RX ADMIN — ENALAPRIL MALEATE 20 MG: 10 TABLET ORAL at 09:07

## 2018-07-13 RX ADMIN — PIPERACILLIN SODIUM AND TAZOBACTAM SODIUM 4.5 G: 4; .5 INJECTION, POWDER, LYOPHILIZED, FOR SOLUTION INTRAVENOUS at 04:07

## 2018-07-13 RX ADMIN — STANDARDIZED SENNA CONCENTRATE AND DOCUSATE SODIUM 2 TABLET: 8.6; 5 TABLET, FILM COATED ORAL at 09:07

## 2018-07-13 NOTE — PLAN OF CARE
Problem: Patient Care Overview  Goal: Plan of Care Review  Outcome: Ongoing (interventions implemented as appropriate)  Fall precautions maintained. Pt free from falls/injuries. Pt repositions and ambulates independently. Pt has occasional c/o pain. Pain well controlled with PRN medication. Clear liquid diet maintained and tolerated. Infection control with IV abx. Fluid promotion with PO fluids and IV fluids. JOSE drain care performed. Patient tolerated well. Patient turns every 2 hours. POC and meds reviewed. Patient verbalized understanding. Side rails up x2. Bed Low and locked. Personal items and call light within reach. No signs/symptoms of acute distress. Chart check done. Will monitor

## 2018-07-13 NOTE — ASSESSMENT & PLAN NOTE
- Continues to have high grade fevers. Max temp of 102.6 overnight. Currently being treated for pneumonia and diverticulitis with abscess. S/p ct guided drainage of abscess with placement of JOSE drain x 2  - ID consulted -- recommended repeat CT chest/abdomen/pelvis due to continued fever  - Blood cultures with NGTD  - Wound culture with no growth

## 2018-07-13 NOTE — CONSULTS
Pharmacy Vancomycin Consult Note    WBC=8.11  prqt=853.2  Crcl=77.9ml/min Scr=0.7    Cultures: NGTD  Dx. Pneumonia  Goal trough=15-20mcg/ml    Patient has a good scr, crcl, and bun. Though we don't have detailed I&Os, it seems patient is voiding multiple times throughout the day. It dose not look like patient has received contrast during this encounter. Because of this I will start with Vancomycin 1 gram Q12 dosing. However, patient is also on Zosyn which when used together can have a nephrotoxic effects so we will proceed with caution.     Trough due 7/14 @0600    Thank you for allowing us to participate in this patient's care.  Kaylie Mathis, Pharm D 7/12/2018 7:13 PM

## 2018-07-13 NOTE — ASSESSMENT & PLAN NOTE
- CT chest/abdomen/pelvis with contrast showed multilobar infiltrates with small right effusion concerning for pneumonia  - CXR showed no infiltrates and CT abdomen/pelvis with contrast negative for infiltrates at lung bases -- both obtained on admit  - No WBC count. Hx of HIV, will obtain CD 4 count -- 339. Will continue IV Vancomycin and Zosyn  - Repeat blood cultures with NGTD   - Supplemental oxygen prn, keep O2 sats > 92%  - Duonebs prn

## 2018-07-13 NOTE — PROGRESS NOTES
[facility] Hospital Medicine  Progress Note    Patient Name: Lizz Noguera  MRN: [id number]  Patient Class: IP- Inpatient   Admission Date: 7/7/2018  Length of Stay: 6 days   Attending Physician: David Barnes MD  Primary Care Provider: Joseluis Doe MD (Inactive)        Subjective:     Principal Problem:Diverticulitis of large intestine with abscess without bleeding    HPI:  Lizz Noguera is a 57 y.o. female patient with a PMHx of HIV, CVA with right sided residual weakness, HLP, depression, who presented to the ER for suprapubic abd pain which onset suddenly this AM. Associated sxs included constipation. Patient denies any n/v/d, hematochezia, dysuria, hematuria, difficulty urinating, frequency, fever, chills, and all other sxs at this time. Pt denies any history of abdominal surgeries. She still has her appendix. CT scan showed uncomplicated diverticulitis of the sigmoid without abscess. Blood cultures pending. In ER, Temp 103.7, glucose 148, lactic acid 2.5. She is admitted with diverticulitis on IV flagyl and IV cipro.     Hospital Course:  Ms Noguera is a 57 year old female with PMHx HIV, CVA, HLP who presented to complaints of lower abdominal pain. Associated symptoms include constipation. CT scan of the abdomen showed uncomplicated diverticulitis of the sigmoid without abscess. She is currently on Cipro IV and Flagyl IV. She received Senna-docusate and Miralax on yesterday with no BM results. She continues to complain of constipation. Will give mag citrate X 1 and monitor.    7/9/2018- Patient seen and examined today. She is not having abdomen pain and is not constipation. Patient not able to eat regular diet this afternoon. Vital signs stable. Continuing Cipro IV and Flagyl IV.     07/10/18 -- Fever overnight of 101.4. Blood cultures (+) gram negative rods, BACTEROIDES VULGATUS. Susceptibilities pending. Pt currently receiving IV Zosyn and Cipro. Diet advanced, tolerated without  "abdominal pain, N/V. Pt reports, "my stomach is bigger than normal, I can barely see my belly button." Mild distension noted. Will obtain abdominal US to assess further.    07/11/18 -- Due to fever and abdominal distension yesterday obtained repeat CT. CT abdomen/pelvis with contrast showed anterior pelvic abscess measuring 8.5 cm with several foci of free air adjacent to the sigmoid colon in the mesentery and liver likely from perforated diverticulum. General surgery consulted and recommended ct guided abscess drainage to be performed by IR. Antibiotics changed to IV Zosyn only. Repeat blood cultures with NGTD.     07/12/18 -- S/p ct guided drainage of abscess, cultures sent which are currently pending. Has 2 JOSE drains placed by IR. Max temp of 101.2 overnight. Repeat blood cultures NGTD. Pt has been started on clear liquid diet to due repeat abdominal xray results this afternoon -- prominent loops of small bowel are seen throughout the abdomen. May be related to bowel dysfunction secondary to diverticular abscess. General surgery following.     07/13/18 -- Max temp of 102.6 overnight. Discussed case with ID who recommended repeat CT chest/abdomen/pelvis due to continued high grade fevers. Abdominal pain is improving, reporting bowel movements are becoming more formed. Per General surgery, may still require sigmoid resection for diverticulitis however there is also the possibility that she could develop a small-bowel obstruction and this will be monitored    Review of Systems   Constitutional: Positive for activity change and fever.   HENT: Negative.    Eyes: Negative.    Respiratory: Negative for apnea, cough, choking, chest tightness, shortness of breath, wheezing and stridor.    Cardiovascular: Negative for chest pain, palpitations and leg swelling.   Gastrointestinal: Positive for abdominal distention, abdominal pain and diarrhea. Negative for constipation, nausea and vomiting.   Endocrine: Negative.  "   Genitourinary: Negative.    Musculoskeletal: Negative.    Skin: Negative.    Allergic/Immunologic: Negative.    Neurological: Negative for dizziness, tremors, seizures, syncope, facial asymmetry, speech difficulty, weakness, light-headedness, numbness and headaches.   Hematological: Negative.    Psychiatric/Behavioral: Negative.      Objective:     Vital Signs (Most Recent):  Temp: 99.4 °F (37.4 °C) (07/13/18 1608)  Pulse: 95 (07/13/18 1608)  Resp: 18 (07/13/18 1608)  BP: (!) 155/75 (07/13/18 1608)  SpO2: 97 % (07/13/18 1608) Vital Signs (24h Range):  Temp:  [98.7 °F (37.1 °C)-102.6 °F (39.2 °C)] 99.4 °F (37.4 °C)  Pulse:  [] 95  Resp:  [16-19] 18  SpO2:  [95 %-97 %] 97 %  BP: (145-163)/(67-77) 155/75     Weight: 63 kg (138 lb 14.2 oz)  Body mass index is 23.11 kg/m².    Intake/Output Summary (Last 24 hours) at 07/13/18 1727  Last data filed at 07/13/18 1100   Gross per 24 hour   Intake          3383.33 ml   Output               20 ml   Net          3363.33 ml      Physical Exam   Constitutional: She is oriented to person, place, and time. She appears well-developed. She is cooperative. She is easily aroused. No distress.   HENT:   Head: Normocephalic and atraumatic.   Eyes: EOM are normal.   Neck: Normal range of motion. Neck supple.   Cardiovascular: Normal rate, regular rhythm, normal heart sounds and intact distal pulses.    No murmur heard.  Pulmonary/Chest: Effort normal and breath sounds normal. No respiratory distress. She has no wheezes. She has no rales. She exhibits no tenderness.   Abdominal: Soft. Bowel sounds are normal. She exhibits no distension. There is tenderness in the right lower quadrant. There is no rigidity, no rebound, no guarding and no CVA tenderness.   Abdominal dressing C/D/I  JOSE drains x 2    Genitourinary:   Genitourinary Comments: Deferred   Musculoskeletal: Normal range of motion.   Neurological: She is alert, oriented to person, place, and time and easily aroused. No sensory  deficit.   Skin: Skin is warm and dry. Capillary refill takes less than 2 seconds.   Psychiatric: She has a normal mood and affect. Her behavior is normal. Judgment and thought content normal.   Nursing note and vitals reviewed.      Significant Labs:   CBC:   Recent Labs  Lab 07/12/18 0454 07/13/18 0514   WBC 8.11 9.82   HGB 8.3* 8.2*   HCT 24.9* 24.4*    241     CMP:   Recent Labs  Lab 07/12/18 0454 07/13/18 0514    136   K 3.1* 3.0*    108   CO2 20* 21*   GLU 77 89   BUN 5* 3*   CREATININE 0.7 0.7   CALCIUM 8.9 9.0   PROT 5.5* 5.8*   ALBUMIN 2.3* 2.4*   BILITOT 1.3* 1.2*   ALKPHOS 111 114   AST 24 20   ALT 8* 8*   ANIONGAP 7* 7*   EGFRNONAA >60 >60       Significant Imaging:   Imaging Results          CT Abdomen Pelvis With Contrast (Final result)  Result time 07/07/18 11:30:11    Final result by Arpan Del Castillo MD (07/07/18 11:30:11)                 Impression:      1.  Significant thickening of the mid sigmoid colon associated with diverticula, with surrounding edema.  Associated ascites in the cul-de-sac and around the liver.  Findings are concerning for uncomplicated diverticulitis of the sigmoid colon.  Negative for free air or focal drainable fluid collection.    2. Negative for acute process involving the abdomen or pelvis otherwise.  Normal appendix.  Negative for renal stone disease or hydronephrosis.    3.  Moderate to severe multifactorial spinal canal stenosis at L4/L5 and L5/S1.  Clinical correlation is advised.    4.  Avascular necrosis changes to the left greater than right hip joints.  Clinical correlation is advised.    5.  Cholelithiasis.  Small fat filled umbilical hernia.  Other nonemergent findings as described above.    All CT scans at this facility used dose modulation, iterative reconstruction, and/or weight based dosing when appropriate to reduce radiation dose to as low as reasonably achievable.      Electronically signed by: Arpan Del Castillo  MD  Date:    07/07/2018  Time:    11:30             Narrative:    EXAMINATION:  CT ABDOMEN PELVIS WITH CONTRAST    CLINICAL HISTORY:  Lower abdominal pain;    TECHNIQUE:  Axial images through the abdomen and pelvis were obtained with the use of IV contrast.  Sagittal and coronal reconstructions are provided for review.  Oral contrast was not utilized.    COMPARISON:  None    FINDINGS:  No comparison studies are available.    LUNG BASES:   Lung bases are clear.  Negative for pleural or pericardial effusions. The distal esophagus is normal.    ABDOMEN: Gallstones.  The liver, spleen and gallbladder otherwise appear normal.  The pancreas is unremarkable.  Kidneys and adrenal glands are normal.    Negative for adenopathy noted within the abdomen or pelvis.  Vascular calcifications are present without aneurysmal changes. Portal vein is patent.    The stomach and small bowel appear normal.  There is significant edema within sigmoid colon with associated diverticula.  With moderate surrounding edema.  Mild to moderate ascites in the cul-de-sac and mild ascites around the liver.  The rest of the colon appears normal.  A normal appendix.    PELVIS: The urinary bladder is unremarkable.    The female pelvic organs are normal. There are pelvic phleboliths.    No significant osseous abnormality is identified.  Broad-based disc bulge at L4/L5 and L5/S1 with moderate to severe multifactorial spinal canal stenosis.  There are significant degenerative changes a changes of the femoral heads.  Osteopenia.    Negative for groin adenopathy.    Tiny fat filled umbilical hernia.  The abdominal wall is otherwise intact.                               X-Ray Chest AP Portable (Final result)  Result time 07/07/18 10:18:44    Final result by Arpan Del Castillo MD (07/07/18 10:18:44)                 Impression:      1.  Negative for acute process involving the chest, considering there are lower lung volumes on today's study.    2.  Stable findings  as noted above.      Electronically signed by: Arpan Del Castillo MD  Date:    07/07/2018  Time:    10:18             Narrative:    EXAMINATION:  XR CHEST AP PORTABLE    CLINICAL HISTORY:  Sepsis;    COMPARISON:  2 June 2014    FINDINGS:  EKG leads overlie the chest.  Mildly low lung volumes.  The lungs are otherwise clear.  The cardiac silhouette size is normal. The trachea is midline and the mediastinal width is normal. Negative for focal infiltrate, effusion or pneumothorax. Pulmonary vasculature is normal. Negative for osseous abnormalities. Tortuous aorta.  Convex left curvature of the lower thoracic spine.  Stable calcifications within the central portions of both humeri, possibly bone infarctions.                              Assessment/Plan:      * Diverticulitis of large intestine with abscess without bleeding    - Initial CT scan upon admit showed diverticulitis without abscess. Pt was treated for diverticulitis initially with IV Cipro and Flagyl  - Blood cultures with gram negative bacteremia, BACTEROIDES VULGATUS. Normally susceptible to Flagyl and Zosyn  - Repeat blood cultures with NGTD  - Due to fever (101.4) and abdominal distension yesterday, 07/10/18, obtained CT chest/abdomen/pelvis with contrast which showed anterior pelvic abscess measuring 8.5 cm with several foci of free air adjacent to the sigmoid colon in the mesentery and liver likely from perforated diverticulum  - General surgery consulted and recommended ct guided abscess drainage -- to be performed by IR -- JOSE drains x 2. Cultures with no growth  - Continue IV Vancomycin and Zosyn  - She may still require sigmoid resection for diverticulitis however there is also the possibility that she could develop a small-bowel obstruction and this will be monitored          Perforated diverticulum    - Due to fever (101.4) and abdominal distension, CT abdomen/pelvis with contrast showed anterior pelvic abscess measuring 8.5 cm with several foci of free  "air adjacent to the sigmoid colon in the mesentery and liver likely from perforated diverticulum  - General surgery consulted and recommended ct guided abscess drainage  - IR performed abscess drainage on 07/11/18 -- has JOSE drains x 2  - Cultures pending -- no growth          Gram-negative bacteremia    - Blood cultures with gram negative bacteremia, BACTEROIDES VULGATUS. Normally susceptible to Zosyn or Flagyl  - Cipro/Flagyl discontinued today due to CT findings. Pt had a total of 4 days of Flagyl before discontinuation   - Continue IV Zosyn  - Repeat blood cultures with NGTD -- appears to have resolved  - ID consulted for recommendations of antibiotics due to bacteremia upon discharge          Fever    - Continues to have high grade fevers. Max temp of 102.6 overnight. Currently being treated for pneumonia and diverticulitis with abscess. S/p ct guided drainage of abscess with placement of JOSE drain x 2  - ID consulted -- recommended repeat CT chest/abdomen/pelvis due to continued fever  - Blood cultures with NGTD  - Wound culture with no growth          Pneumonia due to infectious organism    - CT chest/abdomen/pelvis with contrast showed multilobar infiltrates with small right effusion concerning for pneumonia  - CXR showed no infiltrates and CT abdomen/pelvis with contrast negative for infiltrates at lung bases -- both obtained on admit  - No WBC count. Hx of HIV, will obtain CD 4 count -- 339. Will continue IV Vancomycin and Zosyn  - Repeat blood cultures with NGTD   - Supplemental oxygen prn, keep O2 sats > 92%  - Duonebs prn          Hypokalemia    - K+ 3.0  - KCL 40 mEq PO x 1  - Repeat BMP in AM          HIV (human immunodeficiency virus infection)    - Sees Dr. Doe at [facility] outpatient  - Pt unsure of last CD4 count but was told "it was good."  - CD 4 339  - Continue home medications          Tobacco abuse    - Smoking cessation counseling  - Nicotine patch              VTE Risk Mitigation         " Ordered     heparin (porcine) injection 5,000 Units  Every 8 hours      07/13/18 1741     IP VTE LOW RISK PATIENT  Once      07/07/18 1357     Place sequential compression device  Until discontinued      07/07/18 1357              ALIS Barton  Department of Hospital Medicine   [facility]

## 2018-07-13 NOTE — ASSESSMENT & PLAN NOTE
- Due to fever (101.4) and abdominal distension, CT abdomen/pelvis with contrast showed anterior pelvic abscess measuring 8.5 cm with several foci of free air adjacent to the sigmoid colon in the mesentery and liver likely from perforated diverticulum  - General surgery consulted and recommended ct guided abscess drainage  - IR performed abscess drainage on 07/11/18 -- has JOSE drains x 2  - Cultures pending -- no growth

## 2018-07-13 NOTE — ASSESSMENT & PLAN NOTE
"- Sees Dr. Doe at [facility] outpatient  - Pt unsure of last CD4 count but was told "it was good."  - CD 4 339  - Continue home medications    "

## 2018-07-13 NOTE — PROGRESS NOTES
[facility]   General Surgery  Progress Note    Subjective:     History of Present Illness:  Lizz Noguera is a 57 y.o. female patient with a PMHx of HIV, CVA with right sided residual weakness, HLP, depression. She initially presented to the ER for suprapubic abd pain and was found to have acute uncomplicated diverticulitis. She was admitted and treated with IV antibiotics. She initially improved, however yesterday she had a fever of 101 and increasing abdominal tenderness and distention. A CT was repeated and is concerning for perforated diverticulitis with abscess. General Surgery was consulted.    Post-Op Info:  * No surgery found *         Interval History: febrile overnight. Pain improving. No nausea. Passes flatus.    Medications:  Continuous Infusions:   sodium chloride 0.9% 100 mL/hr at 07/13/18 0403     Scheduled Meds:   buPROPion  150 mg Oral BID    cyproheptadine  4 mg Oral TID    darunavir-cobicistat  1 tablet Oral Daily    ferrous gluconate  324 mg Oral Daily    piperacillin-tazobactam (ZOSYN) IVPB  4.5 g Intravenous Q8H    rosuvastatin  20 mg Oral Daily    senna-docusate 8.6-50 mg  2 tablet Oral QHS    vancomycin (VANCOCIN) IVPB  1,000 mg Intravenous Q12H     PRN Meds:acetaminophen, dextrose 50%, dextrose 50%, fentaNYL, glucagon (human recombinant), glucose, glucose, HYDROcodone-acetaminophen, ibuprofen, insulin aspart U-100, midazolam, morphine, ondansetron, sodium chloride 0.9%     Review of patient's allergies indicates:   Allergen Reactions    Bactrim [sulfamethoxazole-trimethoprim] Rash     Objective:     Vital Signs (Most Recent):  Temp: 98.8 °F (37.1 °C) (07/13/18 0818)  Pulse: 68 (07/13/18 0818)  Resp: 18 (07/13/18 0818)  BP: (!) 151/74 (07/13/18 0818)  SpO2: 95 % (07/13/18 0818) Vital Signs (24h Range):  Temp:  [98.7 °F (37.1 °C)-102.6 °F (39.2 °C)] 98.8 °F (37.1 °C)  Pulse:  [] 68  Resp:  [16-24] 18  SpO2:  [95 %-98 %] 95 %  BP: (135-163)/(66-77) 151/74      Weight: 63 kg (138 lb 14.2 oz)  Body mass index is 23.11 kg/m².    Intake/Output - Last 3 Shifts       07/11 0700 - 07/12 0659 07/12 0700 - 07/13 0659 07/13 0700 - 07/14 0659    P.O. 480 0 240    I.V. (mL/kg) 2716.7 (43.1) 2391.7 (38)     IV Piggyback 300 550     Total Intake(mL/kg) 3496.7 (55.5) 2941.7 (46.7) 240 (3.8)    Drains 220.5 120     Total Output 220.5 120      Net +3276.2 +2821.7 +240           Urine Occurrence 5 x 2 x     Stool Occurrence 0 x            Physical Exam   Constitutional: She is oriented to person, place, and time. She appears well-developed and well-nourished.   HENT:   Head: Normocephalic and atraumatic.   Eyes: EOM are normal.   Cardiovascular: Normal rate and regular rhythm.    Pulmonary/Chest: Effort normal. No respiratory distress.   Abdominal: Soft. She exhibits distension (mild soft distention). There is tenderness (lower abdomen).   Drains in place.    Musculoskeletal: Normal range of motion.   Neurological: She is alert and oriented to person, place, and time.   Skin: Skin is warm and dry.   Psychiatric: She has a normal mood and affect. Thought content normal.   Vitals reviewed.      Significant Labs:  CBC:   Recent Labs  Lab 07/13/18  0514   WBC 9.82   RBC 2.63*   HGB 8.2*   HCT 24.4*      MCV 93   MCH 31.2*   MCHC 33.6     CMP:   Recent Labs  Lab 07/13/18  0514   GLU 89   CALCIUM 9.0   ALBUMIN 2.4*   PROT 5.8*      K 3.0*   CO2 21*      BUN 3*   CREATININE 0.7   ALKPHOS 114   ALT 8*   AST 20   BILITOT 1.2*       Significant Diagnostics:  I have reviewed all pertinent imaging results/findings within the past 24 hours.    Assessment/Plan:     * Diverticulitis of large intestine with abscess without bleeding    CT drainage completed  Wait cultures  Continue antibiotics    Look into home health for drain care    Repeat ct in 5 to 7 days to check on the abscess    XR shows dilated small bowel - concerning for ileus 2/2 intraabdominal abscess vs small bowel  obstruction due to inflammatory adhesion. Tx conservatively for now with clear liquids. NPO if not tolerating clears. Ambulate, serial XRs            Ramona Robbins PA-C  General Surgery  [facility]

## 2018-07-13 NOTE — SUBJECTIVE & OBJECTIVE
Interval History: febrile overnight. Pain improving. No nausea.     Medications:  Continuous Infusions:   sodium chloride 0.9% 100 mL/hr at 07/13/18 0403     Scheduled Meds:   buPROPion  150 mg Oral BID    cyproheptadine  4 mg Oral TID    darunavir-cobicistat  1 tablet Oral Daily    ferrous gluconate  324 mg Oral Daily    piperacillin-tazobactam (ZOSYN) IVPB  4.5 g Intravenous Q8H    rosuvastatin  20 mg Oral Daily    senna-docusate 8.6-50 mg  2 tablet Oral QHS    vancomycin (VANCOCIN) IVPB  1,000 mg Intravenous Q12H     PRN Meds:acetaminophen, dextrose 50%, dextrose 50%, fentaNYL, glucagon (human recombinant), glucose, glucose, HYDROcodone-acetaminophen, ibuprofen, insulin aspart U-100, midazolam, morphine, ondansetron, sodium chloride 0.9%     Review of patient's allergies indicates:   Allergen Reactions    Bactrim [sulfamethoxazole-trimethoprim] Rash     Objective:     Vital Signs (Most Recent):  Temp: 98.8 °F (37.1 °C) (07/13/18 0818)  Pulse: 68 (07/13/18 0818)  Resp: 18 (07/13/18 0818)  BP: (!) 151/74 (07/13/18 0818)  SpO2: 95 % (07/13/18 0818) Vital Signs (24h Range):  Temp:  [98.7 °F (37.1 °C)-102.6 °F (39.2 °C)] 98.8 °F (37.1 °C)  Pulse:  [] 68  Resp:  [16-24] 18  SpO2:  [95 %-98 %] 95 %  BP: (135-163)/(66-77) 151/74     Weight: 63 kg (138 lb 14.2 oz)  Body mass index is 23.11 kg/m².    Intake/Output - Last 3 Shifts       07/11 0700 - 07/12 0659 07/12 0700 - 07/13 0659 07/13 0700 - 07/14 0659    P.O. 480 0 240    I.V. (mL/kg) 2716.7 (43.1) 2391.7 (38)     IV Piggyback 300 550     Total Intake(mL/kg) 3496.7 (55.5) 2941.7 (46.7) 240 (3.8)    Drains 220.5 120     Total Output 220.5 120      Net +3276.2 +2821.7 +240           Urine Occurrence 5 x 2 x     Stool Occurrence 0 x            Physical Exam   Constitutional: She is oriented to person, place, and time. She appears well-developed and well-nourished.   HENT:   Head: Normocephalic and atraumatic.   Eyes: EOM are normal.   Cardiovascular:  Normal rate and regular rhythm.    Pulmonary/Chest: Effort normal. No respiratory distress.   Abdominal: Soft. She exhibits distension (mild soft distention). There is tenderness (lower abdomen).   Drains in place.    Musculoskeletal: Normal range of motion.   Neurological: She is alert and oriented to person, place, and time.   Skin: Skin is warm and dry.   Psychiatric: She has a normal mood and affect. Thought content normal.   Vitals reviewed.      Significant Labs:  CBC:   Recent Labs  Lab 07/13/18  0514   WBC 9.82   RBC 2.63*   HGB 8.2*   HCT 24.4*      MCV 93   MCH 31.2*   MCHC 33.6     CMP:   Recent Labs  Lab 07/13/18 0514   GLU 89   CALCIUM 9.0   ALBUMIN 2.4*   PROT 5.8*      K 3.0*   CO2 21*      BUN 3*   CREATININE 0.7   ALKPHOS 114   ALT 8*   AST 20   BILITOT 1.2*       Significant Diagnostics:  I have reviewed all pertinent imaging results/findings within the past 24 hours.

## 2018-07-13 NOTE — ASSESSMENT & PLAN NOTE
- Blood cultures with gram negative bacteremia, BACTEROIDES VULGATUS. Normally susceptible to Zosyn or Flagyl  - Cipro/Flagyl discontinued today due to CT findings. Pt had a total of 4 days of Flagyl before discontinuation   - Continue IV Zosyn  - Repeat blood cultures with NGTD -- appears to have resolved  - ID consulted for recommendations of antibiotics due to bacteremia upon discharge

## 2018-07-13 NOTE — PLAN OF CARE
Problem: Patient Care Overview  Goal: Plan of Care Review  Outcome: Ongoing (interventions implemented as appropriate)  Drain care performed, kian drain 2x. purulent drainage. Pt remains free of injury, pain managed adequately, no s/s of distress. 24 hour chart check completed. Will continue to monitor.

## 2018-07-13 NOTE — PROGRESS NOTES
Vancomycin Progress Notes:    Pneumonia due to infectious organism/Diverticulitis of large intestine with abscess without bleeding    Estimated Creatinine Clearance: 77.9 mL/min (based on SCr of 0.7 mg/dL).  white blood cell count = 9.82  Tmax/24h = 102.6 F    Cultures -Aspirate from Abdomen-  Moderate WBC's       Rare yeast     Rare Gram positive rods          Blood - NGTD (orginally gm neg rods -BACTEROIDES VULGATUS  Serum creatinine stable = 0.7  Continue1 gm iv q12hrs  Trough due 7/14 @ 0800/Olive Kinney [facility] 7/13/2018 2:56 PM

## 2018-07-13 NOTE — SUBJECTIVE & OBJECTIVE
Review of Systems   Constitutional: Positive for activity change and fever.   HENT: Negative.    Eyes: Negative.    Respiratory: Negative for apnea, cough, choking, chest tightness, shortness of breath, wheezing and stridor.    Cardiovascular: Negative for chest pain, palpitations and leg swelling.   Gastrointestinal: Positive for abdominal distention, abdominal pain and diarrhea. Negative for constipation, nausea and vomiting.   Endocrine: Negative.    Genitourinary: Negative.    Musculoskeletal: Negative.    Skin: Negative.    Allergic/Immunologic: Negative.    Neurological: Negative for dizziness, tremors, seizures, syncope, facial asymmetry, speech difficulty, weakness, light-headedness, numbness and headaches.   Hematological: Negative.    Psychiatric/Behavioral: Negative.      Objective:     Vital Signs (Most Recent):  Temp: 99.4 °F (37.4 °C) (07/13/18 1608)  Pulse: 95 (07/13/18 1608)  Resp: 18 (07/13/18 1608)  BP: (!) 155/75 (07/13/18 1608)  SpO2: 97 % (07/13/18 1608) Vital Signs (24h Range):  Temp:  [98.7 °F (37.1 °C)-102.6 °F (39.2 °C)] 99.4 °F (37.4 °C)  Pulse:  [] 95  Resp:  [16-19] 18  SpO2:  [95 %-97 %] 97 %  BP: (145-163)/(67-77) 155/75     Weight: 63 kg (138 lb 14.2 oz)  Body mass index is 23.11 kg/m².    Intake/Output Summary (Last 24 hours) at 07/13/18 1727  Last data filed at 07/13/18 1100   Gross per 24 hour   Intake          3383.33 ml   Output               20 ml   Net          3363.33 ml      Physical Exam   Constitutional: She is oriented to person, place, and time. She appears well-developed. She is cooperative. She is easily aroused. No distress.   HENT:   Head: Normocephalic and atraumatic.   Eyes: EOM are normal.   Neck: Normal range of motion. Neck supple.   Cardiovascular: Normal rate, regular rhythm, normal heart sounds and intact distal pulses.    No murmur heard.  Pulmonary/Chest: Effort normal and breath sounds normal. No respiratory distress. She has no wheezes. She has no  rales. She exhibits no tenderness.   Abdominal: Soft. Bowel sounds are normal. She exhibits no distension. There is tenderness in the right lower quadrant. There is no rigidity, no rebound, no guarding and no CVA tenderness.   Abdominal dressing C/D/I  JOSE drains x 2    Genitourinary:   Genitourinary Comments: Deferred   Musculoskeletal: Normal range of motion.   Neurological: She is alert, oriented to person, place, and time and easily aroused. No sensory deficit.   Skin: Skin is warm and dry. Capillary refill takes less than 2 seconds.   Psychiatric: She has a normal mood and affect. Her behavior is normal. Judgment and thought content normal.   Nursing note and vitals reviewed.      Significant Labs:   CBC:   Recent Labs  Lab 07/12/18 0454 07/13/18 0514   WBC 8.11 9.82   HGB 8.3* 8.2*   HCT 24.9* 24.4*    241     CMP:   Recent Labs  Lab 07/12/18 0454 07/13/18 0514    136   K 3.1* 3.0*    108   CO2 20* 21*   GLU 77 89   BUN 5* 3*   CREATININE 0.7 0.7   CALCIUM 8.9 9.0   PROT 5.5* 5.8*   ALBUMIN 2.3* 2.4*   BILITOT 1.3* 1.2*   ALKPHOS 111 114   AST 24 20   ALT 8* 8*   ANIONGAP 7* 7*   EGFRNONAA >60 >60       Significant Imaging:   Imaging Results          CT Abdomen Pelvis With Contrast (Final result)  Result time 07/07/18 11:30:11    Final result by Arpan Del Castillo MD (07/07/18 11:30:11)                 Impression:      1.  Significant thickening of the mid sigmoid colon associated with diverticula, with surrounding edema.  Associated ascites in the cul-de-sac and around the liver.  Findings are concerning for uncomplicated diverticulitis of the sigmoid colon.  Negative for free air or focal drainable fluid collection.    2. Negative for acute process involving the abdomen or pelvis otherwise.  Normal appendix.  Negative for renal stone disease or hydronephrosis.    3.  Moderate to severe multifactorial spinal canal stenosis at L4/L5 and L5/S1.  Clinical correlation is advised.    4.   Avascular necrosis changes to the left greater than right hip joints.  Clinical correlation is advised.    5.  Cholelithiasis.  Small fat filled umbilical hernia.  Other nonemergent findings as described above.    All CT scans at this facility used dose modulation, iterative reconstruction, and/or weight based dosing when appropriate to reduce radiation dose to as low as reasonably achievable.      Electronically signed by: Arpan Del Castillo MD  Date:    07/07/2018  Time:    11:30             Narrative:    EXAMINATION:  CT ABDOMEN PELVIS WITH CONTRAST    CLINICAL HISTORY:  Lower abdominal pain;    TECHNIQUE:  Axial images through the abdomen and pelvis were obtained with the use of IV contrast.  Sagittal and coronal reconstructions are provided for review.  Oral contrast was not utilized.    COMPARISON:  None    FINDINGS:  No comparison studies are available.    LUNG BASES:   Lung bases are clear.  Negative for pleural or pericardial effusions. The distal esophagus is normal.    ABDOMEN: Gallstones.  The liver, spleen and gallbladder otherwise appear normal.  The pancreas is unremarkable.  Kidneys and adrenal glands are normal.    Negative for adenopathy noted within the abdomen or pelvis.  Vascular calcifications are present without aneurysmal changes. Portal vein is patent.    The stomach and small bowel appear normal.  There is significant edema within sigmoid colon with associated diverticula.  With moderate surrounding edema.  Mild to moderate ascites in the cul-de-sac and mild ascites around the liver.  The rest of the colon appears normal.  A normal appendix.    PELVIS: The urinary bladder is unremarkable.    The female pelvic organs are normal. There are pelvic phleboliths.    No significant osseous abnormality is identified.  Broad-based disc bulge at L4/L5 and L5/S1 with moderate to severe multifactorial spinal canal stenosis.  There are significant degenerative changes a changes of the femoral heads.   Osteopenia.    Negative for groin adenopathy.    Tiny fat filled umbilical hernia.  The abdominal wall is otherwise intact.                               X-Ray Chest AP Portable (Final result)  Result time 07/07/18 10:18:44    Final result by Arpan Del Castillo MD (07/07/18 10:18:44)                 Impression:      1.  Negative for acute process involving the chest, considering there are lower lung volumes on today's study.    2.  Stable findings as noted above.      Electronically signed by: Arpan Del Castillo MD  Date:    07/07/2018  Time:    10:18             Narrative:    EXAMINATION:  XR CHEST AP PORTABLE    CLINICAL HISTORY:  Sepsis;    COMPARISON:  2 June 2014    FINDINGS:  EKG leads overlie the chest.  Mildly low lung volumes.  The lungs are otherwise clear.  The cardiac silhouette size is normal. The trachea is midline and the mediastinal width is normal. Negative for focal infiltrate, effusion or pneumothorax. Pulmonary vasculature is normal. Negative for osseous abnormalities. Tortuous aorta.  Convex left curvature of the lower thoracic spine.  Stable calcifications within the central portions of both humeri, possibly bone infarctions.

## 2018-07-13 NOTE — ASSESSMENT & PLAN NOTE
CT drainage completed  Wait cultures  Continue antibiotics    Look into home health for drain care    Repeat ct in 5 to 7 days to check on the abscess    XR concerning for ileus likely 2/2 intraabdominal abscess  Clear liquids, ambulate, serial XRs

## 2018-07-13 NOTE — ASSESSMENT & PLAN NOTE
- Initial CT scan upon admit showed diverticulitis without abscess. Pt was treated for diverticulitis initially with IV Cipro and Flagyl  - Blood cultures with gram negative bacteremia, BACTEROIDES VULGATUS. Normally susceptible to Flagyl and Zosyn  - Repeat blood cultures with NGTD  - Due to fever (101.4) and abdominal distension yesterday, 07/10/18, obtained CT chest/abdomen/pelvis with contrast which showed anterior pelvic abscess measuring 8.5 cm with several foci of free air adjacent to the sigmoid colon in the mesentery and liver likely from perforated diverticulum  - General surgery consulted and recommended ct guided abscess drainage -- to be performed by IR -- JOSE drains x 2. Cultures with no growth  - Continue IV Vancomycin and Zosyn  - She may still require sigmoid resection for diverticulitis however there is also the possibility that she could develop a small-bowel obstruction and this will be monitored

## 2018-07-13 NOTE — PT/OT/SLP PROGRESS
Physical Therapy      Patient Name:  Lizz Noguera   MRN:  [id number]    0845 PT MET IN  HOWEVER REFUSED P.T. D/T ABD PAIN. PT NURSE BOAZ AWARE. P.T. TO ATTEMPT TX NEXT VISIT.    Angela Moon, PT,7/13/2018

## 2018-07-14 LAB
ALBUMIN SERPL BCP-MCNC: 2.4 G/DL
ALP SERPL-CCNC: 121 U/L
ALT SERPL W/O P-5'-P-CCNC: 10 U/L
ANION GAP SERPL CALC-SCNC: 7 MMOL/L
ANION GAP SERPL CALC-SCNC: 9 MMOL/L
AST SERPL-CCNC: 25 U/L
BACTERIA SPEC AEROBE CULT: NORMAL
BASOPHILS # BLD AUTO: 0.03 K/UL
BASOPHILS NFR BLD: 0.3 %
BILIRUB SERPL-MCNC: 1.2 MG/DL
BUN SERPL-MCNC: 2 MG/DL
BUN SERPL-MCNC: 2 MG/DL
CALCIUM SERPL-MCNC: 9.1 MG/DL
CALCIUM SERPL-MCNC: 9.2 MG/DL
CHLORIDE SERPL-SCNC: 108 MMOL/L
CHLORIDE SERPL-SCNC: 109 MMOL/L
CO2 SERPL-SCNC: 21 MMOL/L
CO2 SERPL-SCNC: 23 MMOL/L
CREAT SERPL-MCNC: 0.8 MG/DL
CREAT SERPL-MCNC: 0.8 MG/DL
DIFFERENTIAL METHOD: ABNORMAL
EOSINOPHIL # BLD AUTO: 0 K/UL
EOSINOPHIL NFR BLD: 0.4 %
ERYTHROCYTE [DISTWIDTH] IN BLOOD BY AUTOMATED COUNT: 13.2 %
EST. GFR  (AFRICAN AMERICAN): >60 ML/MIN/1.73 M^2
EST. GFR  (AFRICAN AMERICAN): >60 ML/MIN/1.73 M^2
EST. GFR  (NON AFRICAN AMERICAN): >60 ML/MIN/1.73 M^2
EST. GFR  (NON AFRICAN AMERICAN): >60 ML/MIN/1.73 M^2
GLUCOSE SERPL-MCNC: 85 MG/DL
GLUCOSE SERPL-MCNC: 95 MG/DL
HCT VFR BLD AUTO: 23.4 %
HGB BLD-MCNC: 7.8 G/DL
LYMPHOCYTES # BLD AUTO: 2.2 K/UL
LYMPHOCYTES NFR BLD: 21 %
MAGNESIUM SERPL-MCNC: 1.8 MG/DL
MCH RBC QN AUTO: 30.8 PG
MCHC RBC AUTO-ENTMCNC: 33.3 G/DL
MCV RBC AUTO: 93 FL
MONOCYTES # BLD AUTO: 1.6 K/UL
MONOCYTES NFR BLD: 15.3 %
NEUTROPHILS # BLD AUTO: 6.7 K/UL
NEUTROPHILS NFR BLD: 64.1 %
PLATELET # BLD AUTO: 283 K/UL
PMV BLD AUTO: 9.5 FL
POTASSIUM SERPL-SCNC: 2.9 MMOL/L
POTASSIUM SERPL-SCNC: 3.2 MMOL/L
PROT SERPL-MCNC: 6 G/DL
RBC # BLD AUTO: 2.53 M/UL
SODIUM SERPL-SCNC: 138 MMOL/L
SODIUM SERPL-SCNC: 139 MMOL/L
VANCOMYCIN TROUGH SERPL-MCNC: 13.1 UG/ML
WBC # BLD AUTO: 10.54 K/UL

## 2018-07-14 PROCEDURE — 63600175 PHARM REV CODE 636 W HCPCS: Performed by: INTERNAL MEDICINE

## 2018-07-14 PROCEDURE — 80053 COMPREHEN METABOLIC PANEL: CPT

## 2018-07-14 PROCEDURE — 25000003 PHARM REV CODE 250: Performed by: NURSE PRACTITIONER

## 2018-07-14 PROCEDURE — 96372 THER/PROPH/DIAG INJ SC/IM: CPT

## 2018-07-14 PROCEDURE — 97116 GAIT TRAINING THERAPY: CPT

## 2018-07-14 PROCEDURE — 94761 N-INVAS EAR/PLS OXIMETRY MLT: CPT

## 2018-07-14 PROCEDURE — 25000003 PHARM REV CODE 250: Performed by: INTERNAL MEDICINE

## 2018-07-14 PROCEDURE — 83735 ASSAY OF MAGNESIUM: CPT

## 2018-07-14 PROCEDURE — 80202 ASSAY OF VANCOMYCIN: CPT

## 2018-07-14 PROCEDURE — 85025 COMPLETE CBC W/AUTO DIFF WBC: CPT

## 2018-07-14 PROCEDURE — 80048 BASIC METABOLIC PNL TOTAL CA: CPT

## 2018-07-14 PROCEDURE — 63600175 PHARM REV CODE 636 W HCPCS: Performed by: NURSE PRACTITIONER

## 2018-07-14 PROCEDURE — 99231 SBSQ HOSP IP/OBS SF/LOW 25: CPT | Mod: ,,, | Performed by: SURGERY

## 2018-07-14 PROCEDURE — 11000001 HC ACUTE MED/SURG PRIVATE ROOM

## 2018-07-14 PROCEDURE — 36415 COLL VENOUS BLD VENIPUNCTURE: CPT

## 2018-07-14 RX ORDER — POTASSIUM CHLORIDE 20 MEQ/1
60 TABLET, EXTENDED RELEASE ORAL ONCE
Status: COMPLETED | OUTPATIENT
Start: 2018-07-14 | End: 2018-07-14

## 2018-07-14 RX ADMIN — POTASSIUM CHLORIDE 60 MEQ: 1500 TABLET, EXTENDED RELEASE ORAL at 09:07

## 2018-07-14 RX ADMIN — BUPROPION HYDROCHLORIDE 150 MG: 150 TABLET, FILM COATED, EXTENDED RELEASE ORAL at 08:07

## 2018-07-14 RX ADMIN — VANCOMYCIN HYDROCHLORIDE 1000 MG: 1 INJECTION, POWDER, LYOPHILIZED, FOR SOLUTION INTRAVENOUS at 05:07

## 2018-07-14 RX ADMIN — PIPERACILLIN SODIUM AND TAZOBACTAM SODIUM 4.5 G: 4; .5 INJECTION, POWDER, LYOPHILIZED, FOR SOLUTION INTRAVENOUS at 03:07

## 2018-07-14 RX ADMIN — CYPROHEPTADINE HYDROCHLORIDE 4 MG: 4 TABLET ORAL at 09:07

## 2018-07-14 RX ADMIN — FERROUS GLUCONATE TAB 324 MG (37.5 MG ELEMENTAL IRON) 324 MG: 324 (37.5 FE) TAB at 09:07

## 2018-07-14 RX ADMIN — ENALAPRIL MALEATE 20 MG: 10 TABLET ORAL at 09:07

## 2018-07-14 RX ADMIN — CYPROHEPTADINE HYDROCHLORIDE 4 MG: 4 TABLET ORAL at 04:07

## 2018-07-14 RX ADMIN — STANDARDIZED SENNA CONCENTRATE AND DOCUSATE SODIUM 2 TABLET: 8.6; 5 TABLET, FILM COATED ORAL at 08:07

## 2018-07-14 RX ADMIN — PIPERACILLIN SODIUM AND TAZOBACTAM SODIUM 4.5 G: 4; .5 INJECTION, POWDER, LYOPHILIZED, FOR SOLUTION INTRAVENOUS at 11:07

## 2018-07-14 RX ADMIN — HEPARIN SODIUM 5000 UNITS: 5000 INJECTION, SOLUTION INTRAVENOUS; SUBCUTANEOUS at 05:07

## 2018-07-14 RX ADMIN — ACETAMINOPHEN 650 MG: 325 TABLET, FILM COATED ORAL at 11:07

## 2018-07-14 RX ADMIN — CYPROHEPTADINE HYDROCHLORIDE 4 MG: 4 TABLET ORAL at 08:07

## 2018-07-14 RX ADMIN — BUPROPION HYDROCHLORIDE 150 MG: 150 TABLET, FILM COATED, EXTENDED RELEASE ORAL at 09:07

## 2018-07-14 RX ADMIN — PIPERACILLIN SODIUM AND TAZOBACTAM SODIUM 4.5 G: 4; .5 INJECTION, POWDER, LYOPHILIZED, FOR SOLUTION INTRAVENOUS at 07:07

## 2018-07-14 RX ADMIN — ROSUVASTATIN CALCIUM 20 MG: 10 TABLET, FILM COATED ORAL at 09:07

## 2018-07-14 RX ADMIN — VANCOMYCIN HYDROCHLORIDE 1000 MG: 1 INJECTION, POWDER, LYOPHILIZED, FOR SOLUTION INTRAVENOUS at 12:07

## 2018-07-14 NOTE — ASSESSMENT & PLAN NOTE
- Blood cultures with gram negative bacteremia, BACTEROIDES VULGATUS. Normally susceptible to Zosyn or Flagyl  - Cipro/Flagyl discontinued today due to CT findings. Pt had a total of 4 days of Flagyl before discontinuation   - Repeat blood cultures with NGTD -- appears to have resolved  - ID consulted for recommendations of antibiotics due to bacteremia upon discharge  - Currently on IV Vancomycin and Zosyn

## 2018-07-14 NOTE — ASSESSMENT & PLAN NOTE
IR drains with serous output, flushed drains daily  Small increase in size of anterior fluid collection, if continues to have fevers can have IR aspirate for drain  Continue antibiotics  Look into home health for drain care  Ileus secondary to diverticulitis, patient with bowel movements, small bowel distention and air-fluid levels on x-ray improving  Advance diet as tolerated  Ambulate

## 2018-07-14 NOTE — ASSESSMENT & PLAN NOTE
- Initial CT scan upon admit showed diverticulitis without abscess. Pt was treated for diverticulitis initially with IV Cipro and Flagyl  - Blood cultures with gram negative bacteremia, BACTEROIDES VULGATUS. Normally susceptible to Flagyl and Zosyn  - Repeat blood cultures with NGTD  - Due to fever (101.4) and abdominal distension yesterday, 07/10/18, obtained CT chest/abdomen/pelvis with contrast which showed anterior pelvic abscess measuring 8.5 cm with several foci of free air adjacent to the sigmoid colon in the mesentery and liver likely from perforated diverticulum  - General surgery consulted and recommended ct guided abscess drainage -- performed by IR -- JOSE drains x 2  - Aerobic culture from anterior pelvis -- candida glabrata  - Aerobic culture from posterior pelvis -- enterococcus avivia, susceptibilities pending  - Continue IV Vancomycin and Zosyn    07/14/18  - Repeat CT chest/abdomen/pelvis with contrast showed mild enlargement of a small left lower anterior abdominal fluid collection from 2.1 to 2.3 cm which may represent a developing abscesses  - Discussed with Dr. Colindres (general surgery) -- due to continued fever, which could be also due to pneumonia, consult IR to see if they may be able to aspirate from that area

## 2018-07-14 NOTE — SIGNIFICANT EVENT
Called by nurse due to elevated BP, no orders. Patient BP trends noted, going up. Will resume home meds. Consider adjusting IV hydration pending course      Hypertension:   Renal function normal  Resume Vasotec, first dose now  montior trends

## 2018-07-14 NOTE — PLAN OF CARE
Problem: Patient Care Overview  Goal: Plan of Care Review  Outcome: Ongoing (interventions implemented as appropriate)  Pt experienced fever T max 102.3 this shift, adequate relief obtained with PRN Tylenol. BP controlled at this time. Denies n/v or pain. J/P drains in place with small amount of purulent drainage. Drain care performed as ordered. IVF/abx infused as ordered. Chart reviewed. Will continue to monitor.

## 2018-07-14 NOTE — ASSESSMENT & PLAN NOTE
- Hypertensive overnight. Home medication, Vasotec, resumed. BP improved  - Will continue to monitor and adjust medications as needed

## 2018-07-14 NOTE — ASSESSMENT & PLAN NOTE
- Due to fever (101.4) and abdominal distension, CT abdomen/pelvis with contrast showed anterior pelvic abscess measuring 8.5 cm with several foci of free air adjacent to the sigmoid colon in the mesentery and liver likely from perforated diverticulum  - General surgery consulted and recommended ct guided abscess drainage  - IR performed abscess drainage on 07/11/18 -- has JOSE drains x 2  - Blood cultures with NGTD  - Aerobic culture from anterior pelvis -- candida glabrata  - Aerobic culture from posterior pelvis -- enterococcus avivia, susceptibilities pending

## 2018-07-14 NOTE — PROGRESS NOTES
[facility] Hospital Medicine  Progress Note    Patient Name: Lizz Noguera  MRN: [id number]  Patient Class: IP- Inpatient   Admission Date: 7/7/2018  Length of Stay: 7 days  Attending Physician: David Barnes MD  Primary Care Provider: Joseluis Doe MD (Inactive)        Subjective:     Principal Problem:Diverticulitis of large intestine with abscess without bleeding    HPI:  Lizz Noguera is a 57 y.o. female patient with a PMHx of HIV, CVA with right sided residual weakness, HLP, depression, who presented to the ER for suprapubic abd pain which onset suddenly this AM. Associated sxs included constipation. Patient denies any n/v/d, hematochezia, dysuria, hematuria, difficulty urinating, frequency, fever, chills, and all other sxs at this time. Pt denies any history of abdominal surgeries. She still has her appendix. CT scan showed uncomplicated diverticulitis of the sigmoid without abscess. Blood cultures pending. In ER, Temp 103.7, glucose 148, lactic acid 2.5. She is admitted with diverticulitis on IV flagyl and IV cipro.     Hospital Course:  Ms Noguera is a 57 year old female with PMHx HIV, CVA, HLP who presented to complaints of lower abdominal pain. Associated symptoms include constipation. CT scan of the abdomen showed uncomplicated diverticulitis of the sigmoid without abscess. She is currently on Cipro IV and Flagyl IV. She received Senna-docusate and Miralax on yesterday with no BM results. She continues to complain of constipation. Will give mag citrate X 1 and monitor.    7/9/2018- Patient seen and examined today. She is not having abdomen pain and is not constipation. Patient not able to eat regular diet this afternoon. Vital signs stable. Continuing Cipro IV and Flagyl IV.     07/10/18 -- Fever overnight of 101.4. Blood cultures (+) gram negative rods, BACTEROIDES VULGATUS. Susceptibilities pending. Pt currently receiving IV Zosyn and Cipro. Diet advanced, tolerated without  "abdominal pain, N/V. Pt reports, "my stomach is bigger than normal, I can barely see my belly button." Mild distension noted. Will obtain abdominal US to assess further.    07/11/18 -- Due to fever and abdominal distension yesterday obtained repeat CT. CT abdomen/pelvis with contrast showed anterior pelvic abscess measuring 8.5 cm with several foci of free air adjacent to the sigmoid colon in the mesentery and liver likely from perforated diverticulum. General surgery consulted and recommended ct guided abscess drainage to be performed by IR. Antibiotics changed to IV Zosyn only. Repeat blood cultures with NGTD.     07/12/18 -- S/p ct guided drainage of abscess, cultures sent which are currently pending. Has 2 JOSE drains placed by IR. Max temp of 101.2 overnight. Repeat blood cultures NGTD. Pt has been started on clear liquid diet to due repeat abdominal xray results this afternoon -- prominent loops of small bowel are seen throughout the abdomen. May be related to bowel dysfunction secondary to diverticular abscess. General surgery following.     07/13/18 -- Max temp of 102.6 overnight. Discussed case with ID who recommended repeat CT chest/abdomen/pelvis due to continued high grade fevers. Abdominal pain is improving, reporting bowel movements are becoming more formed. Per General surgery, may still require sigmoid resection for diverticulitis however there is also the possibility that she could develop a small-bowel obstruction and this will be monitored    07/14/18 -- Max temp of 102.3 last night. Repeat CT chest/abdomen/pelvis with contrast showed worsening multilobar pneumonia and mild enlargement of a small left lower anterior abdominal fluid collection from 2.1 to 2.3 cm which may represent a developing abscesses. General surgery following. Discussed case with Dr. Colindres -- continues to have fever, could be from pneumonia and/or abscess. Recommended consulting IR and see if the abscess could be aspirated. No " need for surgical procedure at this time. Will get modified barium swallow study and SLP evaluation for possible aspiration.       Review of Systems   Constitutional: Positive for activity change and fever.   HENT: Negative.    Eyes: Negative.    Respiratory: Negative for apnea, cough, choking, chest tightness, shortness of breath, wheezing and stridor.    Cardiovascular: Negative for chest pain, palpitations and leg swelling.   Gastrointestinal: Positive for abdominal distention, abdominal pain and diarrhea. Negative for constipation, nausea and vomiting.   Endocrine: Negative.    Genitourinary: Negative.    Musculoskeletal: Negative.    Skin: Negative.    Allergic/Immunologic: Negative.    Neurological: Negative for dizziness, tremors, seizures, syncope, facial asymmetry, speech difficulty, weakness, light-headedness, numbness and headaches.   Hematological: Negative.    Psychiatric/Behavioral: Negative.      Objective:     Vital Signs (Most Recent):  Temp: 98.1 °F (36.7 °C) (07/14/18 1105)  Pulse: 97 (07/14/18 1105)  Resp: 18 (07/14/18 1105)  BP: 139/66 (07/14/18 1105)  SpO2: (!) 94 % (07/14/18 1105) Vital Signs (24h Range):  Temp:  [98.1 °F (36.7 °C)-102.3 °F (39.1 °C)] 98.1 °F (36.7 °C)  Pulse:  [] 97  Resp:  [18-20] 18  SpO2:  [94 %-98 %] 94 %  BP: (119-184)/(55-75) 139/66     Weight: 62.2 kg (137 lb 2 oz)  Body mass index is 22.82 kg/m².    Intake/Output Summary (Last 24 hours) at 07/14/18 1451  Last data filed at 07/14/18 1400   Gross per 24 hour   Intake          2296.67 ml   Output               65 ml   Net          2231.67 ml      Physical Exam   Constitutional: She is oriented to person, place, and time. She appears well-developed. She is cooperative. She is easily aroused. No distress.   HENT:   Head: Normocephalic and atraumatic.   Eyes: EOM are normal.   Neck: Normal range of motion. Neck supple.   Cardiovascular: Normal rate, regular rhythm, normal heart sounds and intact distal pulses.    No  murmur heard.  Pulmonary/Chest: Effort normal and breath sounds normal. No respiratory distress. She has no wheezes. She has no rales. She exhibits no tenderness.   Abdominal: Soft. Bowel sounds are normal. She exhibits no distension. There is no tenderness. There is no rigidity, no rebound, no guarding and no CVA tenderness.   Abdominal dressing C/D/I  JOSE drains x 2    Genitourinary:   Genitourinary Comments: Deferred   Musculoskeletal: Normal range of motion.   Neurological: She is alert, oriented to person, place, and time and easily aroused. No sensory deficit.   Skin: Skin is warm and dry. Capillary refill takes less than 2 seconds.   Psychiatric: She has a normal mood and affect. Her behavior is normal. Judgment and thought content normal.   Nursing note and vitals reviewed.      Significant Labs:   CBC:   Recent Labs  Lab 07/13/18  0514 07/14/18  0621   WBC 9.82 10.54   HGB 8.2* 7.8*   HCT 24.4* 23.4*    283     CMP:   Recent Labs  Lab 07/13/18  0514 07/14/18  0621    138   K 3.0* 2.9*    108   CO2 21* 23   GLU 89 85   BUN 3* 2*   CREATININE 0.7 0.8   CALCIUM 9.0 9.1   PROT 5.8* 6.0   ALBUMIN 2.4* 2.4*   BILITOT 1.2* 1.2*   ALKPHOS 114 121   AST 20 25   ALT 8* 10   ANIONGAP 7* 7*   EGFRNONAA >60 >60     Magnesium:   Recent Labs  Lab 07/14/18  0621   MG 1.8       Significant Imaging:   Imaging Results          CT Abdomen Pelvis With Contrast (Final result)  Result time 07/07/18 11:30:11    Final result by Arpan Del Castillo MD (07/07/18 11:30:11)                 Impression:      1.  Significant thickening of the mid sigmoid colon associated with diverticula, with surrounding edema.  Associated ascites in the cul-de-sac and around the liver.  Findings are concerning for uncomplicated diverticulitis of the sigmoid colon.  Negative for free air or focal drainable fluid collection.    2. Negative for acute process involving the abdomen or pelvis otherwise.  Normal appendix.  Negative for renal  stone disease or hydronephrosis.    3.  Moderate to severe multifactorial spinal canal stenosis at L4/L5 and L5/S1.  Clinical correlation is advised.    4.  Avascular necrosis changes to the left greater than right hip joints.  Clinical correlation is advised.    5.  Cholelithiasis.  Small fat filled umbilical hernia.  Other nonemergent findings as described above.    All CT scans at this facility used dose modulation, iterative reconstruction, and/or weight based dosing when appropriate to reduce radiation dose to as low as reasonably achievable.      Electronically signed by: Arpan Del Castillo MD  Date:    07/07/2018  Time:    11:30             Narrative:    EXAMINATION:  CT ABDOMEN PELVIS WITH CONTRAST    CLINICAL HISTORY:  Lower abdominal pain;    TECHNIQUE:  Axial images through the abdomen and pelvis were obtained with the use of IV contrast.  Sagittal and coronal reconstructions are provided for review.  Oral contrast was not utilized.    COMPARISON:  None    FINDINGS:  No comparison studies are available.    LUNG BASES:   Lung bases are clear.  Negative for pleural or pericardial effusions. The distal esophagus is normal.    ABDOMEN: Gallstones.  The liver, spleen and gallbladder otherwise appear normal.  The pancreas is unremarkable.  Kidneys and adrenal glands are normal.    Negative for adenopathy noted within the abdomen or pelvis.  Vascular calcifications are present without aneurysmal changes. Portal vein is patent.    The stomach and small bowel appear normal.  There is significant edema within sigmoid colon with associated diverticula.  With moderate surrounding edema.  Mild to moderate ascites in the cul-de-sac and mild ascites around the liver.  The rest of the colon appears normal.  A normal appendix.    PELVIS: The urinary bladder is unremarkable.    The female pelvic organs are normal. There are pelvic phleboliths.    No significant osseous abnormality is identified.  Broad-based disc bulge at L4/L5  and L5/S1 with moderate to severe multifactorial spinal canal stenosis.  There are significant degenerative changes a changes of the femoral heads.  Osteopenia.    Negative for groin adenopathy.    Tiny fat filled umbilical hernia.  The abdominal wall is otherwise intact.                               X-Ray Chest AP Portable (Final result)  Result time 07/07/18 10:18:44    Final result by Arpan Del Castillo MD (07/07/18 10:18:44)                 Impression:      1.  Negative for acute process involving the chest, considering there are lower lung volumes on today's study.    2.  Stable findings as noted above.      Electronically signed by: Arpan Del Castillo MD  Date:    07/07/2018  Time:    10:18             Narrative:    EXAMINATION:  XR CHEST AP PORTABLE    CLINICAL HISTORY:  Sepsis;    COMPARISON:  2 June 2014    FINDINGS:  EKG leads overlie the chest.  Mildly low lung volumes.  The lungs are otherwise clear.  The cardiac silhouette size is normal. The trachea is midline and the mediastinal width is normal. Negative for focal infiltrate, effusion or pneumothorax. Pulmonary vasculature is normal. Negative for osseous abnormalities. Tortuous aorta.  Convex left curvature of the lower thoracic spine.  Stable calcifications within the central portions of both humeri, possibly bone infarctions.                              Assessment/Plan:      * Diverticulitis of large intestine with abscess without bleeding    - Initial CT scan upon admit showed diverticulitis without abscess. Pt was treated for diverticulitis initially with IV Cipro and Flagyl  - Blood cultures with gram negative bacteremia, BACTEROIDES VULGATUS. Normally susceptible to Flagyl and Zosyn  - Repeat blood cultures with NGTD  - Due to fever (101.4) and abdominal distension yesterday, 07/10/18, obtained CT chest/abdomen/pelvis with contrast which showed anterior pelvic abscess measuring 8.5 cm with several foci of free air adjacent to the sigmoid colon in the  mesentery and liver likely from perforated diverticulum  - General surgery consulted and recommended ct guided abscess drainage -- performed by IR -- JOSE drains x 2  - Aerobic culture from anterior pelvis -- candida glabrata  - Aerobic culture from posterior pelvis -- enterococcus avivia, susceptibilities pending  - Continue IV Vancomycin and Zosyn    07/14/18  - Repeat CT chest/abdomen/pelvis with contrast showed mild enlargement of a small left lower anterior abdominal fluid collection from 2.1 to 2.3 cm which may represent a developing abscesses  - Discussed with Dr. Colindres (general surgery) -- due to continued fever, which could be also due to pneumonia, consult IR to see if they may be able to aspirate from that area        Perforated diverticulum    - Due to fever (101.4) and abdominal distension, CT abdomen/pelvis with contrast showed anterior pelvic abscess measuring 8.5 cm with several foci of free air adjacent to the sigmoid colon in the mesentery and liver likely from perforated diverticulum  - General surgery consulted and recommended ct guided abscess drainage  - IR performed abscess drainage on 07/11/18 -- has JOSE drains x 2  - Blood cultures with NGTD  - Aerobic culture from anterior pelvis -- candida glabrata  - Aerobic culture from posterior pelvis -- enterococcus avivia, susceptibilities pending          Gram-negative bacteremia    - Blood cultures with gram negative bacteremia, BACTEROIDES VULGATUS. Normally susceptible to Zosyn or Flagyl  - Cipro/Flagyl discontinued today due to CT findings. Pt had a total of 4 days of Flagyl before discontinuation   - Repeat blood cultures with NGTD -- appears to have resolved  - ID consulted for recommendations of antibiotics due to bacteremia upon discharge  - Currently on IV Vancomycin and Zosyn          Hypertension    - Hypertensive overnight. Home medication, Vasotec, resumed. BP improved  - Will continue to monitor and adjust medications as needed           Fever    - Continues to have high grade fevers. Max temp of 102.6 overnight. Currently being treated for pneumonia and diverticulitis with abscess. S/p ct guided drainage of abscess with placement of JOSE drain x 2  - ID consulted -- recommended repeat CT chest/abdomen/pelvis due to continued fever  - Blood cultures with NGTD  - Wound culture with no growth    07/14/18  - Max temp last night of 102.3. Multifactorial -- mulitlobar pneumonia and pelvic abscess. Both are currently being treated. Re consulted IR, repeat CT chest/abdomen/pelvis on 07/13/18 showed mild enlargement of a small left lower anterior abdominal fluid collection from 2.1 to 2.3 cm which may represent a developing abscesses. May be able to aspirate  - Modified barium swallow for possible aspiration pneumonia. If unable to get swallow study over the weekend, will have SLP evaluate  - Blood cultures with NGTD  - Aerobic culture from anterior pelvis -- candida glabrata  - Aerobic culture from posterior pelvis -- enterococcus avivia, susceptibilities pending  - ID following  - Continue IV Vancomycin and Zosyn          Pneumonia due to infectious organism    - CT chest/abdomen/pelvis with contrast showed multilobar infiltrates with small right effusion concerning for pneumonia  - CXR showed no infiltrates and CT abdomen/pelvis with contrast negative for infiltrates at lung bases -- both obtained on admit  - No WBC count. Hx of HIV, will obtain CD 4 count -- 339. Will continue IV Vancomycin and Zosyn  - Repeat blood cultures with NGTD   - Supplemental oxygen prn, keep O2 sats > 92%  - Duonebs prn    07/14/18  - CT chest/abdomen/pelvis showed worsening multilobar pneumonia. Pt currently on IV Vancomycin and Zosyn. Will obtain modified barium swallow for possible aspiration and will consult SLP for evaluation if unable to get swallow study over the weekend          Hypokalemia    - K+ 2.9  - Magnesium level unremarkable  - KCL 60 mEq PO x 1  - Repeat BMP  "now            HIV (human immunodeficiency virus infection)    - Sees Dr. Doe at [facility] outpatient  - Pt unsure of last CD4 count but was told "it was good."  - CD 4 339  - Continue home medications          Tobacco abuse    - Smoking cessation counseling  - Nicotine patch              VTE Risk Mitigation         Ordered     heparin (porcine) injection 5,000 Units  Every 8 hours      07/13/18 1741     IP VTE LOW RISK PATIENT  Once      07/07/18 1357     Place sequential compression device  Until discontinued      07/07/18 1357              ALIS Barton  Department of Hospital Medicine   [facility]  "

## 2018-07-14 NOTE — HPI
57 year old woman with history of  HIV admitted with diverticulitis .  Latest cd4 count -339.  Ct scan of the abdomen showed -  1.  Multilobar infiltrates with small right effusion concerning for pneumonia    2.    Anterior pelvic abscess measuring 8.5 cm with several foci of free air adjacent to the sigmoid colon in the mesentery and liver likely from perforated diverticulum.    She had interval insertion of pigtail catheter and drainage . She has remained febrile .  Repeat CT scan of the chest ,abdomen showed -1. Worsening multilobar pneumonia.  2. Bilateral small effusions, right greater than left  3. Interval placement of pigtail drainage catheters within the 2 pelvic fluid collections with mild amount of fluid remaining in the cul-de-sac collection.  Mild enlargement of a small left lower anterior abdominal fluid collection from 2.1 to 2.3 cm which may represent a developing abscesses well.  4. Cholelithiasis.  T max is 102.3

## 2018-07-14 NOTE — NURSING
Drain care performed. Flushed anterior and posterior JOSE drains with 10cc sterile saline. 15cc total emptied from each drain. Patient tolerated.

## 2018-07-14 NOTE — PROGRESS NOTES
[facility]   General Surgery  Progress Note    Subjective:     History of Present Illness:  Lizz Noguera is a 57 y.o. female patient with a PMHx of HIV, CVA with right sided residual weakness, HLP, depression. She initially presented to the ER for suprapubic abd pain and was found to have acute uncomplicated diverticulitis. She was admitted and treated with IV antibiotics. She initially improved, however yesterday she had a fever of 101 and increasing abdominal tenderness and distention. A CT was repeated and is concerning for perforated diverticulitis with abscess. General Surgery was consulted.    Post-Op Info:  * No surgery found *         Interval History: febrile overnight.  Denies any pain, tolerating clears without nausea, having multiple bowel movements, some shortness of breath but improving    Medications:  Continuous Infusions:   sodium chloride 0.9% 100 mL/hr at 07/13/18 1952     Scheduled Meds:   buPROPion  150 mg Oral BID    cyproheptadine  4 mg Oral TID    darunavir-cobicistat  1 tablet Oral Daily    enalapril  20 mg Oral Daily    ferrous gluconate  324 mg Oral Daily    heparin (porcine)  5,000 Units Subcutaneous Q8H    piperacillin-tazobactam (ZOSYN) IVPB  4.5 g Intravenous Q8H    rosuvastatin  20 mg Oral Daily    senna-docusate 8.6-50 mg  2 tablet Oral QHS    vancomycin (VANCOCIN) IVPB  1,000 mg Intravenous Q12H     PRN Meds:acetaminophen, albuterol-ipratropium, dextrose 50%, dextrose 50%, fentaNYL, glucagon (human recombinant), glucose, glucose, HYDROcodone-acetaminophen, ibuprofen, insulin aspart U-100, midazolam, morphine, ondansetron, sodium chloride 0.9%     Review of patient's allergies indicates:   Allergen Reactions    Bactrim [sulfamethoxazole-trimethoprim] Rash     Objective:     Vital Signs (Most Recent):  Temp: 98.5 °F (36.9 °C) (07/14/18 0329)  Pulse: 93 (07/14/18 0329)  Resp: 20 (07/14/18 0329)  BP: (!) 147/70 (07/14/18 0329)  SpO2: 96 % (07/14/18 0700)  Vital Signs (24h Range):  Temp:  [98.3 °F (36.8 °C)-102.3 °F (39.1 °C)] 98.5 °F (36.9 °C)  Pulse:  [] 93  Resp:  [16-20] 20  SpO2:  [95 %-97 %] 96 %  BP: (135-184)/(65-75) 147/70     Weight: 62.2 kg (137 lb 2 oz)  Body mass index is 22.82 kg/m².    Intake/Output - Last 3 Shifts       07/12 0700 - 07/13 0659 07/13 0700 - 07/14 0659 07/14 0700 - 07/15 0659    P.O. 0 360     I.V. (mL/kg) 2391.7 (38) 1975 (31.8)     IV Piggyback 550 800     Total Intake(mL/kg) 2941.7 (46.7) 3135 (50.4)     Drains 120 65     Total Output 120 65      Net +2821.7 +3070             Urine Occurrence 2 x 6 x     Stool Occurrence  1 x           Physical Exam   Constitutional: She is oriented to person, place, and time. She appears well-developed and well-nourished.   HENT:   Head: Normocephalic and atraumatic.   Eyes: EOM are normal.   Cardiovascular: Normal rate and regular rhythm.    Pulmonary/Chest: Effort normal. No respiratory distress.   Abdominal: Soft. She exhibits no distension (Minimal soft). There is no tenderness.   Drains in place with serous drainage    Musculoskeletal: Normal range of motion.   Neurological: She is alert and oriented to person, place, and time.   Skin: Skin is warm and dry.   Psychiatric: She has a normal mood and affect. Thought content normal.   Vitals reviewed.      Significant Labs:  CBC:     Recent Labs  Lab 07/14/18  0621   WBC 10.54   RBC 2.53*   HGB 7.8*   HCT 23.4*      MCV 93   MCH 30.8   MCHC 33.3     CMP:     Recent Labs  Lab 07/14/18  0621   GLU 85   CALCIUM 9.1   ALBUMIN 2.4*   PROT 6.0      K 2.9*   CO2 23      BUN 2*   CREATININE 0.8   ALKPHOS 121   ALT 10   AST 25   BILITOT 1.2*       Significant Diagnostics:    CT:  1. Worsening multilobar pneumonia.  2. Bilateral small effusions, right greater than left  3. Interval placement of pigtail drainage catheters within the 2 pelvic fluid collections with mild amount of fluid remaining in the cul-de-sac collection.  Mild  enlargement of a small left lower anterior abdominal fluid collection from 2.1 to 2.3 cm which may represent a developing abscesses well.  4. Cholelithiasis.    Abdominal x-ray:  Slight improvement in air fluid levels and small bowel distention  I have reviewed all pertinent imaging results/findings within the past 24 hours.    Assessment/Plan:     * Diverticulitis of large intestine with abscess without bleeding    IR drains with serous output, flushed drains daily  Small increase in size of anterior fluid collection, if continues to have fevers can have IR aspirate for drain  Continue antibiotics  Look into home health for drain care  Ileus secondary to diverticulitis, patient with bowel movements, small bowel distention and air-fluid levels on x-ray improving  Advance diet as tolerated  Ambulate        Pneumonia due to infectious organism    Pulmonary toileting  Antibiotics            Bernard Colindres MD  General Surgery  [facility]

## 2018-07-14 NOTE — PT/OT/SLP PROGRESS
Physical Therapy Treatment    Patient Name:  Lizz Noguera   MRN:  [id number]    Recommendations:     Discharge Recommendations:  home   Discharge Equipment Recommendations:     Barriers to discharge: None    Assessment:     Lizz Noguera is a 57 y.o. female admitted with a medical diagnosis of Diverticulitis of large intestine with abscess without bleeding.  She presents with the following impairments/functional limitations:  weakness .    Tolerated ambulation in the lerner well. Only limited by R leg weakness from previous CVA>    Rehab Prognosis:  good; patient would benefit from acute skilled PT services to address these deficits and reach maximum level of function.      Recent Surgery: * No surgery found *      Plan:     During this hospitalization, patient to be seen 5 x/week to address the above listed problems via gait training  · Plan of Care Expires:  07/15/18   Plan of Care Reviewed with: patient    Subjective     Communicated with nurse: Michael prior to session.  Patient found sitting up in the bed upon PT entry to room, agreeable to treatment.      Chief Complaint: none  Patient comments/goals: none  Pain/Comfort:  · Pain Rating 1: 0/10    Patients cultural, spiritual, Yazdanism conflicts given the current situation:      Objective:     Patient found with: peripheral IV     General Precautions: Standard, fall   Orthopedic Precautions:N/A   Braces:       Functional Mobility:  · Supine to sit : Independent  · Sit to supine: Independent  · Sit to stand: Distance SPv  · Gait training 150 feet with out an assistive device with SBA. Limited by R leg due to old CVA>        AM-PAC 6 CLICK MOBILITY  Turning over in bed (including adjusting bedclothes, sheets and blankets)?: 4  Sitting down on and standing up from a chair with arms (e.g., wheelchair, bedside commode, etc.): 4  Moving from lying on back to sitting on the side of the bed?: 4  Moving to and from a bed to a chair (including a wheelchair)?: 4  Need  to walk in hospital room?: 4  Climbing 3-5 steps with a railing?: 1  Basic Mobility Total Score: 21       Therapeutic Activities and Exercises:       Patient left sitting eob with all lines intact and call button in reach..    GOALS:    Physical Therapy Goals        Problem: Physical Therapy Goal    Goal Priority Disciplines Outcome Goal Variances Interventions   Physical Therapy Goal     PT/OT, PT Ongoing (interventions implemented as appropriate)     Description:  ltg's for PT by 7/15/18  1. Patient will perform sit to stand indep  2. Patient will amb mod indep x 300ft no lob  3. Patient will perform stand b le exs x 20 reps                     Time Tracking:     PT Received On: 07/14/18  PT Start Time: 1200     PT Stop Time: 1210  PT Total Time (min): 10 min     Billable Minutes: Gait Training 10    Treatment Type: Treatment  PT/PTA: PTA     PTA Visit Number: 1     Kymberly Kang, IBRAHIMA  07/14/2018

## 2018-07-14 NOTE — ASSESSMENT & PLAN NOTE
- Continues to have high grade fevers. Max temp of 102.6 overnight. Currently being treated for pneumonia and diverticulitis with abscess. S/p ct guided drainage of abscess with placement of JOSE drain x 2  - ID consulted -- recommended repeat CT chest/abdomen/pelvis due to continued fever  - Blood cultures with NGTD  - Wound culture with no growth    07/14/18  - Max temp last night of 102.3. Multifactorial -- mulitlobar pneumonia and pelvic abscess. Both are currently being treated. Re consulted IR, repeat CT chest/abdomen/pelvis on 07/13/18 showed mild enlargement of a small left lower anterior abdominal fluid collection from 2.1 to 2.3 cm which may represent a developing abscesses. May be able to aspirate  - Modified barium swallow for possible aspiration pneumonia. If unable to get swallow study over the weekend, will have SLP evaluate  - Blood cultures with NGTD  - Aerobic culture from anterior pelvis -- candida glabrata  - Aerobic culture from posterior pelvis -- enterococcus avivia, susceptibilities pending  - ID following  - Continue IV Vancomycin and Zosyn

## 2018-07-14 NOTE — SUBJECTIVE & OBJECTIVE
Interval History: febrile overnight.  Denies any pain, tolerating clears without nausea, having multiple bowel movements, some shortness of breath but improving    Medications:  Continuous Infusions:   sodium chloride 0.9% 100 mL/hr at 07/13/18 1952     Scheduled Meds:   buPROPion  150 mg Oral BID    cyproheptadine  4 mg Oral TID    darunavir-cobicistat  1 tablet Oral Daily    enalapril  20 mg Oral Daily    ferrous gluconate  324 mg Oral Daily    heparin (porcine)  5,000 Units Subcutaneous Q8H    piperacillin-tazobactam (ZOSYN) IVPB  4.5 g Intravenous Q8H    rosuvastatin  20 mg Oral Daily    senna-docusate 8.6-50 mg  2 tablet Oral QHS    vancomycin (VANCOCIN) IVPB  1,000 mg Intravenous Q12H     PRN Meds:acetaminophen, albuterol-ipratropium, dextrose 50%, dextrose 50%, fentaNYL, glucagon (human recombinant), glucose, glucose, HYDROcodone-acetaminophen, ibuprofen, insulin aspart U-100, midazolam, morphine, ondansetron, sodium chloride 0.9%     Review of patient's allergies indicates:   Allergen Reactions    Bactrim [sulfamethoxazole-trimethoprim] Rash     Objective:     Vital Signs (Most Recent):  Temp: 98.5 °F (36.9 °C) (07/14/18 0329)  Pulse: 93 (07/14/18 0329)  Resp: 20 (07/14/18 0329)  BP: (!) 147/70 (07/14/18 0329)  SpO2: 96 % (07/14/18 0700) Vital Signs (24h Range):  Temp:  [98.3 °F (36.8 °C)-102.3 °F (39.1 °C)] 98.5 °F (36.9 °C)  Pulse:  [] 93  Resp:  [16-20] 20  SpO2:  [95 %-97 %] 96 %  BP: (135-184)/(65-75) 147/70     Weight: 62.2 kg (137 lb 2 oz)  Body mass index is 22.82 kg/m².    Intake/Output - Last 3 Shifts       07/12 0700 - 07/13 0659 07/13 0700 - 07/14 0659 07/14 0700 - 07/15 0659    P.O. 0 360     I.V. (mL/kg) 2391.7 (38) 1975 (31.8)     IV Piggyback 550 800     Total Intake(mL/kg) 2941.7 (46.7) 3135 (50.4)     Drains 120 65     Total Output 120 65      Net +2821.7 +3070             Urine Occurrence 2 x 6 x     Stool Occurrence  1 x           Physical Exam   Constitutional: She is  oriented to person, place, and time. She appears well-developed and well-nourished.   HENT:   Head: Normocephalic and atraumatic.   Eyes: EOM are normal.   Cardiovascular: Normal rate and regular rhythm.    Pulmonary/Chest: Effort normal. No respiratory distress.   Abdominal: Soft. She exhibits no distension (Minimal soft). There is no tenderness.   Drains in place with serous drainage    Musculoskeletal: Normal range of motion.   Neurological: She is alert and oriented to person, place, and time.   Skin: Skin is warm and dry.   Psychiatric: She has a normal mood and affect. Thought content normal.   Vitals reviewed.      Significant Labs:  CBC:     Recent Labs  Lab 07/14/18  0621   WBC 10.54   RBC 2.53*   HGB 7.8*   HCT 23.4*      MCV 93   MCH 30.8   MCHC 33.3     CMP:     Recent Labs  Lab 07/14/18  0621   GLU 85   CALCIUM 9.1   ALBUMIN 2.4*   PROT 6.0      K 2.9*   CO2 23      BUN 2*   CREATININE 0.8   ALKPHOS 121   ALT 10   AST 25   BILITOT 1.2*       Significant Diagnostics:    CT:  1. Worsening multilobar pneumonia.  2. Bilateral small effusions, right greater than left  3. Interval placement of pigtail drainage catheters within the 2 pelvic fluid collections with mild amount of fluid remaining in the cul-de-sac collection.  Mild enlargement of a small left lower anterior abdominal fluid collection from 2.1 to 2.3 cm which may represent a developing abscesses well.  4. Cholelithiasis.    Abdominal x-ray:  Slight improvement in air fluid levels and small bowel distention  I have reviewed all pertinent imaging results/findings within the past 24 hours.

## 2018-07-14 NOTE — PROGRESS NOTES
Vancomycin Progress Note:    Dg: Diverticulitis with abscess, HIV, Pneu, Meds: zosyn/Vanc.   Trough returned 13.1, continue dose as is, will reorder trough,   CrCl 68.1, SCR 0.8, WBC 10.54, T 102.3.     Charo Rodriguez [facility]. 7/14/2018 1:56 PM

## 2018-07-14 NOTE — SUBJECTIVE & OBJECTIVE
Past Medical History:   Diagnosis Date    Anemia     Cholelithiasis     CVA (cerebral vascular accident)     Diverticulitis     Glaucoma     HIV disease     HLD (hyperlipidemia)     HTN (hypertension)        Past Surgical History:   Procedure Laterality Date    NONE         Review of patient's allergies indicates:   Allergen Reactions    Bactrim [sulfamethoxazole-trimethoprim] Rash       Medications:  Prescriptions Prior to Admission   Medication Sig    buPROPion (WELLBUTRIN SR) 150 MG TBSR 12 hr tablet Take 150 mg by mouth 2 (two) times daily.    cyproheptadine (PERIACTIN) 4 mg tablet Take 4 mg by mouth 3 (three) times daily.    darunavir-cobicistat 800-150 mg-mg Tab Take 1 tablet by mouth once daily.    emtricitabine-tenofovir 200-300 mg (TRUVADA) 200-300 mg Tab Take 1 tablet by mouth once daily.    ferrous gluconate (FERGON) 324 MG tablet Take 324 mg by mouth once daily.    rosuvastatin (CRESTOR) 20 MG tablet Take 20 mg by mouth once daily.    enalapril (VASOTEC) 20 MG tablet Take 20 mg by mouth.    multivitamin (THERAGRAN) per tablet Take by mouth.     Antibiotics     Start     Stop Route Frequency Ordered    07/13/18 2100  vancomycin 1 g in dextrose 5 % 250 mL IVPB (ready to mix system)      -- IV Every 12 hours (non-standard times) 07/13/18 1455    07/11/18 1100  piperacillin-tazobactam 4.5 g in dextrose 5 % 100 mL IVPB (ready to mix system)      -- IV Every 8 hours (non-standard times) 07/11/18 0951        Antifungals     None        Antivirals         Stop Route Frequency     darunavir-cobicistat      -- Oral Daily             There is no immunization history on file for this patient.    Family History     Problem Relation (Age of Onset)    Stroke Mother        Social History     Social History    Marital status: Legally      Spouse name: N/A    Number of children: N/A    Years of education: N/A     Social History Main Topics    Smoking status: Current Every Day Smoker      Packs/day: 1.00     Years: 46.00     Types: Cigarettes    Smokeless tobacco: Never Used    Alcohol use No    Drug use: No    Sexual activity: Yes     Partners: Male     Other Topics Concern    None     Social History Narrative    Lives with : Scar Michael, surrogate decision maker, HOME [phone]     Review of Systems   Constitutional: Positive for activity change and fever.   HENT: Negative.    Eyes: Negative.    Respiratory: Negative for apnea, cough, choking, chest tightness, shortness of breath, wheezing and stridor.    Cardiovascular: Negative for chest pain, palpitations and leg swelling.   Gastrointestinal: Positive for abdominal distention, abdominal pain and diarrhea. Negative for constipation, nausea and vomiting.   Endocrine: Negative.    Genitourinary: Negative.    Musculoskeletal: Negative.    Skin: Negative.    Allergic/Immunologic: Negative.    Neurological: Negative for dizziness, tremors, seizures, syncope, facial asymmetry, speech difficulty, weakness, light-headedness, numbness and headaches.   Hematological: Negative.    Psychiatric/Behavioral: Negative.      Objective:     Vital Signs (Most Recent):  Temp: (!) 101.6 °F (38.7 °C) (07/13/18 2111)  Pulse: 101 (07/13/18 2111)  Resp: 18 (07/13/18 1951)  BP: (!) 151/67 (07/13/18 2218)  SpO2: 95 % (07/13/18 2111) Vital Signs (24h Range):  Temp:  [98.7 °F (37.1 °C)-102.3 °F (39.1 °C)] 101.6 °F (38.7 °C)  Pulse:  [] 101  Resp:  [16-18] 18  SpO2:  [95 %-97 %] 95 %  BP: (145-184)/(67-75) 151/67     Weight: 63 kg (138 lb 14.2 oz)  Body mass index is 23.11 kg/m².    Estimated Creatinine Clearance: 77.9 mL/min (based on SCr of 0.7 mg/dL).    Physical Exam   Constitutional: She is oriented to person, place, and time. She appears well-developed. She is cooperative. She is easily aroused. No distress.   HENT:   Head: Normocephalic and atraumatic.   Eyes: EOM are normal.   Neck: Normal range of motion. Neck supple.   Cardiovascular:  Normal rate, regular rhythm, normal heart sounds and intact distal pulses.    No murmur heard.  Pulmonary/Chest: Effort normal and breath sounds normal. No respiratory distress. She has no wheezes. She has no rales. She exhibits no tenderness.   Abdominal: Soft. Bowel sounds are normal. She exhibits no distension. There is tenderness in the right lower quadrant. There is no rigidity, no rebound, no guarding and no CVA tenderness.   Abdominal dressing C/D/I  JOSE drains x 2    Genitourinary:   Genitourinary Comments: Deferred   Musculoskeletal: Normal range of motion.   Neurological: She is alert, oriented to person, place, and time and easily aroused. No sensory deficit.   Skin: Skin is warm and dry. Capillary refill takes less than 2 seconds.   Psychiatric: She has a normal mood and affect. Her behavior is normal. Judgment and thought content normal.   Nursing note and vitals reviewed.      Significant Labs:   Blood Culture:   Recent Labs  Lab 07/07/18  0945 07/07/18  0950 07/10/18  1625   LABBLOO Gram stain noemi bottle: Gram negative rods   Positive results previously called 07/08/2018  21:00  BACTEROIDES VULGATUS Gram stain noemi bottle: Gram negative rods   Results called to and read back by: Leroy Yang LPN  07/08/2018  19:24  BACTEROIDES VULGATUS No Growth to date  No Growth to date  No Growth to date  No Growth to date  No Growth to date  No Growth to date     BMP:   Recent Labs  Lab 07/13/18  0514   GLU 89      K 3.0*      CO2 21*   BUN 3*   CREATININE 0.7   CALCIUM 9.0     All pertinent labs within the past 24 hours have been reviewed.    Significant Imaging: I have reviewed all pertinent imaging results/findings within the past 24 hours.

## 2018-07-14 NOTE — ASSESSMENT & PLAN NOTE
"- Sees Dr. Doe at [facility] outpatient  - Pt unsure of last CD4 count but was told "it was good."  - CD 4 339  - Continue home medications    "

## 2018-07-14 NOTE — CONSULTS
[facility]  Infectious Disease  Consult Note    Patient Name: Lizz Noguera  MRN: [id number]  Admission Date: 7/7/2018  Hospital Length of Stay: 6 days  Attending Physician: David Barnes MD  Primary Care Provider: Joseluis Doe MD (Inactive)     Isolation Status: No active isolations    Patient information was obtained from patient and ER records.      Consults  Assessment/Plan:     * Diverticulitis of large intestine with abscess without bleeding    S/p insertion of pigtail catheter -with persistent fever , might need surgical intervention .Will continue Vanco/zosyn          Pneumonia due to infectious organism    On zosyn/vanco,will rule out aspiration         Gram-negative bacteremia    Isolate is bacteroides -will continue zosyn        HIV (human immunodeficiency virus infection)    Will continue darunavir /truvada             Thank you for your consult. I will follow-up with patient. Please contact us if you have any additional questions.    Noble Neal MD  Infectious Disease  [facility]    Subjective:     Principal Problem: Diverticulitis of large intestine with abscess without bleeding    HPI: 57 year old woman with history of  HIV admitted with diverticulitis .  Latest cd4 count -339.  Ct scan of the abdomen showed -  1.  Multilobar infiltrates with small right effusion concerning for pneumonia    2.    Anterior pelvic abscess measuring 8.5 cm with several foci of free air adjacent to the sigmoid colon in the mesentery and liver likely from perforated diverticulum.    She had interval insertion of pigtail catheter and drainage . She has remained febrile .  Repeat CT scan of the chest ,abdomen showed -1. Worsening multilobar pneumonia.  2. Bilateral small effusions, right greater than left  3. Interval placement of pigtail drainage catheters within the 2 pelvic fluid collections with mild amount of fluid remaining in the cul-de-sac collection.  Mild enlargement of a  small left lower anterior abdominal fluid collection from 2.1 to 2.3 cm which may represent a developing abscesses well.  4. Cholelithiasis.  T max is 102.3    Past Medical History:   Diagnosis Date    Anemia     Cholelithiasis     CVA (cerebral vascular accident)     Diverticulitis     Glaucoma     HIV disease     HLD (hyperlipidemia)     HTN (hypertension)        Past Surgical History:   Procedure Laterality Date    NONE         Review of patient's allergies indicates:   Allergen Reactions    Bactrim [sulfamethoxazole-trimethoprim] Rash       Medications:  Prescriptions Prior to Admission   Medication Sig    buPROPion (WELLBUTRIN SR) 150 MG TBSR 12 hr tablet Take 150 mg by mouth 2 (two) times daily.    cyproheptadine (PERIACTIN) 4 mg tablet Take 4 mg by mouth 3 (three) times daily.    darunavir-cobicistat 800-150 mg-mg Tab Take 1 tablet by mouth once daily.    emtricitabine-tenofovir 200-300 mg (TRUVADA) 200-300 mg Tab Take 1 tablet by mouth once daily.    ferrous gluconate (FERGON) 324 MG tablet Take 324 mg by mouth once daily.    rosuvastatin (CRESTOR) 20 MG tablet Take 20 mg by mouth once daily.    enalapril (VASOTEC) 20 MG tablet Take 20 mg by mouth.    multivitamin (THERAGRAN) per tablet Take by mouth.     Antibiotics     Start     Stop Route Frequency Ordered    07/13/18 2100  vancomycin 1 g in dextrose 5 % 250 mL IVPB (ready to mix system)      -- IV Every 12 hours (non-standard times) 07/13/18 1455    07/11/18 1100  piperacillin-tazobactam 4.5 g in dextrose 5 % 100 mL IVPB (ready to mix system)      -- IV Every 8 hours (non-standard times) 07/11/18 0951        Antifungals     None        Antivirals         Stop Route Frequency     darunavir-cobicistat      -- Oral Daily             There is no immunization history on file for this patient.    Family History     Problem Relation (Age of Onset)    Stroke Mother        Social History     Social History    Marital status: Legally       Spouse name: N/A    Number of children: N/A    Years of education: N/A     Social History Main Topics    Smoking status: Current Every Day Smoker     Packs/day: 1.00     Years: 46.00     Types: Cigarettes    Smokeless tobacco: Never Used    Alcohol use No    Drug use: No    Sexual activity: Yes     Partners: Male     Other Topics Concern    None     Social History Narrative    Lives with : Scar Michael, surrogate decision maker, HOME [phone]     Review of Systems   Constitutional: Positive for activity change and fever.   HENT: Negative.    Eyes: Negative.    Respiratory: Negative for apnea, cough, choking, chest tightness, shortness of breath, wheezing and stridor.    Cardiovascular: Negative for chest pain, palpitations and leg swelling.   Gastrointestinal: Positive for abdominal distention, abdominal pain and diarrhea. Negative for constipation, nausea and vomiting.   Endocrine: Negative.    Genitourinary: Negative.    Musculoskeletal: Negative.    Skin: Negative.    Allergic/Immunologic: Negative.    Neurological: Negative for dizziness, tremors, seizures, syncope, facial asymmetry, speech difficulty, weakness, light-headedness, numbness and headaches.   Hematological: Negative.    Psychiatric/Behavioral: Negative.      Objective:     Vital Signs (Most Recent):  Temp: (!) 101.6 °F (38.7 °C) (07/13/18 2111)  Pulse: 101 (07/13/18 2111)  Resp: 18 (07/13/18 1951)  BP: (!) 151/67 (07/13/18 2218)  SpO2: 95 % (07/13/18 2111) Vital Signs (24h Range):  Temp:  [98.7 °F (37.1 °C)-102.3 °F (39.1 °C)] 101.6 °F (38.7 °C)  Pulse:  [] 101  Resp:  [16-18] 18  SpO2:  [95 %-97 %] 95 %  BP: (145-184)/(67-75) 151/67     Weight: 63 kg (138 lb 14.2 oz)  Body mass index is 23.11 kg/m².    Estimated Creatinine Clearance: 77.9 mL/min (based on SCr of 0.7 mg/dL).    Physical Exam   Constitutional: She is oriented to person, place, and time. She appears well-developed. She is cooperative. She is easily  aroused. No distress.   HENT:   Head: Normocephalic and atraumatic.   Eyes: EOM are normal.   Neck: Normal range of motion. Neck supple.   Cardiovascular: Normal rate, regular rhythm, normal heart sounds and intact distal pulses.    No murmur heard.  Pulmonary/Chest: Effort normal and breath sounds normal. No respiratory distress. She has no wheezes. She has no rales. She exhibits no tenderness.   Abdominal: Soft. Bowel sounds are normal. She exhibits no distension. There is tenderness in the right lower quadrant. There is no rigidity, no rebound, no guarding and no CVA tenderness.   Abdominal dressing C/D/I  JOSE drains x 2    Genitourinary:   Genitourinary Comments: Deferred   Musculoskeletal: Normal range of motion.   Neurological: She is alert, oriented to person, place, and time and easily aroused. No sensory deficit.   Skin: Skin is warm and dry. Capillary refill takes less than 2 seconds.   Psychiatric: She has a normal mood and affect. Her behavior is normal. Judgment and thought content normal.   Nursing note and vitals reviewed.      Significant Labs:   Blood Culture:   Recent Labs  Lab 07/07/18  0945 07/07/18  0950 07/10/18  1625   LABBLOO Gram stain noemi bottle: Gram negative rods   Positive results previously called 07/08/2018  21:00  BACTEROIDES VULGATUS Gram stain noemi bottle: Gram negative rods   Results called to and read back by: Leroy Yang LPN  07/08/2018  19:24  BACTEROIDES VULGATUS No Growth to date  No Growth to date  No Growth to date  No Growth to date  No Growth to date  No Growth to date     BMP:   Recent Labs  Lab 07/13/18  0514   GLU 89      K 3.0*      CO2 21*   BUN 3*   CREATININE 0.7   CALCIUM 9.0     All pertinent labs within the past 24 hours have been reviewed.    Significant Imaging: I have reviewed all pertinent imaging results/findings within the past 24 hours.

## 2018-07-14 NOTE — PLAN OF CARE
Problem: Patient Care Overview  Goal: Plan of Care Review  Outcome: Ongoing (interventions implemented as appropriate)  Patient remained free from injury. No c/o pain at this time. Calm. Watching TV. No distress noted.  at bedside.  Oriented x3. Respirations even and non labored. IV  patent and infusing. Bed locked and low. Call light in reach. Safety measures in place. Will continue to monitor. Reviewed plan of care. Patient verbalized understanding and teach back.    12hr chart check complete.

## 2018-07-14 NOTE — ASSESSMENT & PLAN NOTE
- CT chest/abdomen/pelvis with contrast showed multilobar infiltrates with small right effusion concerning for pneumonia  - CXR showed no infiltrates and CT abdomen/pelvis with contrast negative for infiltrates at lung bases -- both obtained on admit  - No WBC count. Hx of HIV, will obtain CD 4 count -- 339. Will continue IV Vancomycin and Zosyn  - Repeat blood cultures with NGTD   - Supplemental oxygen prn, keep O2 sats > 92%  - Duonebs prn    07/14/18  - CT chest/abdomen/pelvis showed worsening multilobar pneumonia. Pt currently on IV Vancomycin and Zosyn. Will obtain modified barium swallow for possible aspiration and will consult SLP for evaluation if unable to get swallow study over the weekend

## 2018-07-14 NOTE — SUBJECTIVE & OBJECTIVE
Review of Systems   Constitutional: Positive for activity change and fever.   HENT: Negative.    Eyes: Negative.    Respiratory: Negative for apnea, cough, choking, chest tightness, shortness of breath, wheezing and stridor.    Cardiovascular: Negative for chest pain, palpitations and leg swelling.   Gastrointestinal: Positive for abdominal distention, abdominal pain and diarrhea. Negative for constipation, nausea and vomiting.   Endocrine: Negative.    Genitourinary: Negative.    Musculoskeletal: Negative.    Skin: Negative.    Allergic/Immunologic: Negative.    Neurological: Negative for dizziness, tremors, seizures, syncope, facial asymmetry, speech difficulty, weakness, light-headedness, numbness and headaches.   Hematological: Negative.    Psychiatric/Behavioral: Negative.      Objective:     Vital Signs (Most Recent):  Temp: 98.1 °F (36.7 °C) (07/14/18 1105)  Pulse: 97 (07/14/18 1105)  Resp: 18 (07/14/18 1105)  BP: 139/66 (07/14/18 1105)  SpO2: (!) 94 % (07/14/18 1105) Vital Signs (24h Range):  Temp:  [98.1 °F (36.7 °C)-102.3 °F (39.1 °C)] 98.1 °F (36.7 °C)  Pulse:  [] 97  Resp:  [18-20] 18  SpO2:  [94 %-98 %] 94 %  BP: (119-184)/(55-75) 139/66     Weight: 62.2 kg (137 lb 2 oz)  Body mass index is 22.82 kg/m².    Intake/Output Summary (Last 24 hours) at 07/14/18 1451  Last data filed at 07/14/18 1400   Gross per 24 hour   Intake          2296.67 ml   Output               65 ml   Net          2231.67 ml      Physical Exam   Constitutional: She is oriented to person, place, and time. She appears well-developed. She is cooperative. She is easily aroused. No distress.   HENT:   Head: Normocephalic and atraumatic.   Eyes: EOM are normal.   Neck: Normal range of motion. Neck supple.   Cardiovascular: Normal rate, regular rhythm, normal heart sounds and intact distal pulses.    No murmur heard.  Pulmonary/Chest: Effort normal and breath sounds normal. No respiratory distress. She has no wheezes. She has no  rales. She exhibits no tenderness.   Abdominal: Soft. Bowel sounds are normal. She exhibits no distension. There is no tenderness. There is no rigidity, no rebound, no guarding and no CVA tenderness.   Abdominal dressing C/D/I  JOSE drains x 2    Genitourinary:   Genitourinary Comments: Deferred   Musculoskeletal: Normal range of motion.   Neurological: She is alert, oriented to person, place, and time and easily aroused. No sensory deficit.   Skin: Skin is warm and dry. Capillary refill takes less than 2 seconds.   Psychiatric: She has a normal mood and affect. Her behavior is normal. Judgment and thought content normal.   Nursing note and vitals reviewed.      Significant Labs:   CBC:   Recent Labs  Lab 07/13/18 0514 07/14/18  0621   WBC 9.82 10.54   HGB 8.2* 7.8*   HCT 24.4* 23.4*    283     CMP:   Recent Labs  Lab 07/13/18 0514 07/14/18  0621    138   K 3.0* 2.9*    108   CO2 21* 23   GLU 89 85   BUN 3* 2*   CREATININE 0.7 0.8   CALCIUM 9.0 9.1   PROT 5.8* 6.0   ALBUMIN 2.4* 2.4*   BILITOT 1.2* 1.2*   ALKPHOS 114 121   AST 20 25   ALT 8* 10   ANIONGAP 7* 7*   EGFRNONAA >60 >60     Magnesium:   Recent Labs  Lab 07/14/18  0621   MG 1.8       Significant Imaging:   Imaging Results          CT Abdomen Pelvis With Contrast (Final result)  Result time 07/07/18 11:30:11    Final result by Arpan Del Castillo MD (07/07/18 11:30:11)                 Impression:      1.  Significant thickening of the mid sigmoid colon associated with diverticula, with surrounding edema.  Associated ascites in the cul-de-sac and around the liver.  Findings are concerning for uncomplicated diverticulitis of the sigmoid colon.  Negative for free air or focal drainable fluid collection.    2. Negative for acute process involving the abdomen or pelvis otherwise.  Normal appendix.  Negative for renal stone disease or hydronephrosis.    3.  Moderate to severe multifactorial spinal canal stenosis at L4/L5 and L5/S1.  Clinical  correlation is advised.    4.  Avascular necrosis changes to the left greater than right hip joints.  Clinical correlation is advised.    5.  Cholelithiasis.  Small fat filled umbilical hernia.  Other nonemergent findings as described above.    All CT scans at this facility used dose modulation, iterative reconstruction, and/or weight based dosing when appropriate to reduce radiation dose to as low as reasonably achievable.      Electronically signed by: Arpan Del Castillo MD  Date:    07/07/2018  Time:    11:30             Narrative:    EXAMINATION:  CT ABDOMEN PELVIS WITH CONTRAST    CLINICAL HISTORY:  Lower abdominal pain;    TECHNIQUE:  Axial images through the abdomen and pelvis were obtained with the use of IV contrast.  Sagittal and coronal reconstructions are provided for review.  Oral contrast was not utilized.    COMPARISON:  None    FINDINGS:  No comparison studies are available.    LUNG BASES:   Lung bases are clear.  Negative for pleural or pericardial effusions. The distal esophagus is normal.    ABDOMEN: Gallstones.  The liver, spleen and gallbladder otherwise appear normal.  The pancreas is unremarkable.  Kidneys and adrenal glands are normal.    Negative for adenopathy noted within the abdomen or pelvis.  Vascular calcifications are present without aneurysmal changes. Portal vein is patent.    The stomach and small bowel appear normal.  There is significant edema within sigmoid colon with associated diverticula.  With moderate surrounding edema.  Mild to moderate ascites in the cul-de-sac and mild ascites around the liver.  The rest of the colon appears normal.  A normal appendix.    PELVIS: The urinary bladder is unremarkable.    The female pelvic organs are normal. There are pelvic phleboliths.    No significant osseous abnormality is identified.  Broad-based disc bulge at L4/L5 and L5/S1 with moderate to severe multifactorial spinal canal stenosis.  There are significant degenerative changes a changes  of the femoral heads.  Osteopenia.    Negative for groin adenopathy.    Tiny fat filled umbilical hernia.  The abdominal wall is otherwise intact.                               X-Ray Chest AP Portable (Final result)  Result time 07/07/18 10:18:44    Final result by Arpan Del Castillo MD (07/07/18 10:18:44)                 Impression:      1.  Negative for acute process involving the chest, considering there are lower lung volumes on today's study.    2.  Stable findings as noted above.      Electronically signed by: Arpan Del Castillo MD  Date:    07/07/2018  Time:    10:18             Narrative:    EXAMINATION:  XR CHEST AP PORTABLE    CLINICAL HISTORY:  Sepsis;    COMPARISON:  2 June 2014    FINDINGS:  EKG leads overlie the chest.  Mildly low lung volumes.  The lungs are otherwise clear.  The cardiac silhouette size is normal. The trachea is midline and the mediastinal width is normal. Negative for focal infiltrate, effusion or pneumothorax. Pulmonary vasculature is normal. Negative for osseous abnormalities. Tortuous aorta.  Convex left curvature of the lower thoracic spine.  Stable calcifications within the central portions of both humeri, possibly bone infarctions.

## 2018-07-14 NOTE — ASSESSMENT & PLAN NOTE
S/p insertion of pigtail catheter -with persistent fever , might need surgical intervention .Will continue Vanco/zosyn

## 2018-07-15 PROBLEM — K57.80 PERFORATED DIVERTICULUM: Status: RESOLVED | Noted: 2018-07-11 | Resolved: 2018-07-15

## 2018-07-15 LAB
ALBUMIN SERPL BCP-MCNC: 2.2 G/DL
ALP SERPL-CCNC: 119 U/L
ALT SERPL W/O P-5'-P-CCNC: 9 U/L
ANION GAP SERPL CALC-SCNC: 7 MMOL/L
AST SERPL-CCNC: 25 U/L
BASOPHILS # BLD AUTO: 0.02 K/UL
BASOPHILS NFR BLD: 0.2 %
BILIRUB SERPL-MCNC: 1.1 MG/DL
BUN SERPL-MCNC: 2 MG/DL
CALCIUM SERPL-MCNC: 9.2 MG/DL
CHLORIDE SERPL-SCNC: 110 MMOL/L
CO2 SERPL-SCNC: 23 MMOL/L
CREAT SERPL-MCNC: 0.8 MG/DL
DACRYOCYTES BLD QL SMEAR: ABNORMAL
DIFFERENTIAL METHOD: ABNORMAL
EOSINOPHIL # BLD AUTO: 0.1 K/UL
EOSINOPHIL NFR BLD: 0.6 %
ERYTHROCYTE [DISTWIDTH] IN BLOOD BY AUTOMATED COUNT: 13.4 %
EST. GFR  (AFRICAN AMERICAN): >60 ML/MIN/1.73 M^2
EST. GFR  (NON AFRICAN AMERICAN): >60 ML/MIN/1.73 M^2
GLUCOSE SERPL-MCNC: 84 MG/DL
HCT VFR BLD AUTO: 22.7 %
HGB BLD-MCNC: 7.6 G/DL
LYMPHOCYTES # BLD AUTO: 2.9 K/UL
LYMPHOCYTES NFR BLD: 23.7 %
MAGNESIUM SERPL-MCNC: 1.8 MG/DL
MCH RBC QN AUTO: 31.1 PG
MCHC RBC AUTO-ENTMCNC: 33.5 G/DL
MCV RBC AUTO: 93 FL
MONOCYTES # BLD AUTO: 1.8 K/UL
MONOCYTES NFR BLD: 14.2 %
NEUTROPHILS # BLD AUTO: 7.6 K/UL
NEUTROPHILS NFR BLD: 62.4 %
OVALOCYTES BLD QL SMEAR: ABNORMAL
PLATELET # BLD AUTO: 334 K/UL
PLATELET BLD QL SMEAR: ABNORMAL
PMV BLD AUTO: 9.6 FL
POLYCHROMASIA BLD QL SMEAR: ABNORMAL
POTASSIUM SERPL-SCNC: 3 MMOL/L
PROT SERPL-MCNC: 6 G/DL
RBC # BLD AUTO: 2.44 M/UL
SODIUM SERPL-SCNC: 140 MMOL/L
WBC # BLD AUTO: 12.35 K/UL

## 2018-07-15 PROCEDURE — 36415 COLL VENOUS BLD VENIPUNCTURE: CPT

## 2018-07-15 PROCEDURE — 25000003 PHARM REV CODE 250: Performed by: NURSE PRACTITIONER

## 2018-07-15 PROCEDURE — 94761 N-INVAS EAR/PLS OXIMETRY MLT: CPT

## 2018-07-15 PROCEDURE — 85025 COMPLETE CBC W/AUTO DIFF WBC: CPT

## 2018-07-15 PROCEDURE — 25000003 PHARM REV CODE 250: Performed by: INTERNAL MEDICINE

## 2018-07-15 PROCEDURE — 99231 SBSQ HOSP IP/OBS SF/LOW 25: CPT | Mod: ,,, | Performed by: SURGERY

## 2018-07-15 PROCEDURE — 63600175 PHARM REV CODE 636 W HCPCS: Performed by: NURSE PRACTITIONER

## 2018-07-15 PROCEDURE — G8996 SWALLOW CURRENT STATUS: HCPCS | Mod: CK

## 2018-07-15 PROCEDURE — 63600175 PHARM REV CODE 636 W HCPCS: Performed by: INTERNAL MEDICINE

## 2018-07-15 PROCEDURE — 80053 COMPREHEN METABOLIC PANEL: CPT

## 2018-07-15 PROCEDURE — 92610 EVALUATE SWALLOWING FUNCTION: CPT

## 2018-07-15 PROCEDURE — 96372 THER/PROPH/DIAG INJ SC/IM: CPT

## 2018-07-15 PROCEDURE — 83735 ASSAY OF MAGNESIUM: CPT

## 2018-07-15 PROCEDURE — 11000001 HC ACUTE MED/SURG PRIVATE ROOM

## 2018-07-15 PROCEDURE — G8997 SWALLOW GOAL STATUS: HCPCS | Mod: CJ

## 2018-07-15 RX ORDER — LINEZOLID 2 MG/ML
600 INJECTION, SOLUTION INTRAVENOUS
Status: DISCONTINUED | OUTPATIENT
Start: 2018-07-15 | End: 2018-07-17 | Stop reason: HOSPADM

## 2018-07-15 RX ORDER — POTASSIUM CHLORIDE 20 MEQ/1
40 TABLET, EXTENDED RELEASE ORAL ONCE
Status: COMPLETED | OUTPATIENT
Start: 2018-07-15 | End: 2018-07-15

## 2018-07-15 RX ADMIN — FERROUS GLUCONATE TAB 324 MG (37.5 MG ELEMENTAL IRON) 324 MG: 324 (37.5 FE) TAB at 08:07

## 2018-07-15 RX ADMIN — CYPROHEPTADINE HYDROCHLORIDE 4 MG: 4 TABLET ORAL at 09:07

## 2018-07-15 RX ADMIN — PIPERACILLIN SODIUM AND TAZOBACTAM SODIUM 4.5 G: 4; .5 INJECTION, POWDER, LYOPHILIZED, FOR SOLUTION INTRAVENOUS at 02:07

## 2018-07-15 RX ADMIN — BUPROPION HYDROCHLORIDE 150 MG: 150 TABLET, FILM COATED, EXTENDED RELEASE ORAL at 08:07

## 2018-07-15 RX ADMIN — LINEZOLID 600 MG: 600 INJECTION, SOLUTION INTRAVENOUS at 07:07

## 2018-07-15 RX ADMIN — POTASSIUM CHLORIDE 40 MEQ: 1500 TABLET, EXTENDED RELEASE ORAL at 10:07

## 2018-07-15 RX ADMIN — LINEZOLID 600 MG: 600 INJECTION, SOLUTION INTRAVENOUS at 10:07

## 2018-07-15 RX ADMIN — HEPARIN SODIUM 5000 UNITS: 5000 INJECTION, SOLUTION INTRAVENOUS; SUBCUTANEOUS at 01:07

## 2018-07-15 RX ADMIN — PIPERACILLIN SODIUM AND TAZOBACTAM SODIUM 4.5 G: 4; .5 INJECTION, POWDER, LYOPHILIZED, FOR SOLUTION INTRAVENOUS at 10:07

## 2018-07-15 RX ADMIN — CYPROHEPTADINE HYDROCHLORIDE 4 MG: 4 TABLET ORAL at 03:07

## 2018-07-15 RX ADMIN — CYPROHEPTADINE HYDROCHLORIDE 4 MG: 4 TABLET ORAL at 08:07

## 2018-07-15 RX ADMIN — MICAFUNGIN SODIUM 100 MG: 20 INJECTION, POWDER, LYOPHILIZED, FOR SOLUTION INTRAVENOUS at 08:07

## 2018-07-15 RX ADMIN — HEPARIN SODIUM 5000 UNITS: 5000 INJECTION, SOLUTION INTRAVENOUS; SUBCUTANEOUS at 08:07

## 2018-07-15 RX ADMIN — ROSUVASTATIN CALCIUM 20 MG: 10 TABLET, FILM COATED ORAL at 08:07

## 2018-07-15 RX ADMIN — ENALAPRIL MALEATE 20 MG: 10 TABLET ORAL at 08:07

## 2018-07-15 RX ADMIN — STANDARDIZED SENNA CONCENTRATE AND DOCUSATE SODIUM 2 TABLET: 8.6; 5 TABLET, FILM COATED ORAL at 09:07

## 2018-07-15 RX ADMIN — PIPERACILLIN SODIUM AND TAZOBACTAM SODIUM 4.5 G: 4; .5 INJECTION, POWDER, LYOPHILIZED, FOR SOLUTION INTRAVENOUS at 06:07

## 2018-07-15 RX ADMIN — BUPROPION HYDROCHLORIDE 150 MG: 150 TABLET, FILM COATED, EXTENDED RELEASE ORAL at 09:07

## 2018-07-15 RX ADMIN — HEPARIN SODIUM 5000 UNITS: 5000 INJECTION, SOLUTION INTRAVENOUS; SUBCUTANEOUS at 05:07

## 2018-07-15 NOTE — PT/OT/SLP EVAL
"Speech Language Pathology Evaluation  Bedside Swallow    Patient Name:  Lizz Noguera   MRN:  [id number]  Admitting Diagnosis: Diverticulitis of large intestine with abscess without bleeding    Recommendations:                 General Recommendations:  Aspiration precautions  Diet recommendations:   ,     Aspiration Precautions: In Place  General Precautions: Standard, aspiration  Communication strategies:  Pt is verbal    History:     Past Medical History:   Diagnosis Date    Anemia     Cholelithiasis     CVA (cerebral vascular accident)     Diverticulitis     Glaucoma     HIV disease     HLD (hyperlipidemia)     HTN (hypertension)        Past Surgical History:   Procedure Laterality Date    NONE         Social History: Patient lives with     Prior Intubation HX: none per reprot    Modified Barium Swallow:  Hx unknown    Chest X-Rays: see report for details    Prior diet: Regular consistency    Occupation/hobbies/homemaking: n/a    Subjective     Pt sitting up in bed;  Compliant with S.T./Swallowing evaluation  Patient goals: "to get better and go home"    Pain/Comfort:  · Pain Rating 1: 0/10    Objective:     Oral Musculature Evaluation  · Dentition: upper and lower dentures;   Pt did present with decreased coordination of lingual and labial musculature.    Bedside Swallow Eval:   Consistencies Assessed:  · Thin liquids, Pureed consistency;   Solids    Oral Phase:   · Pt presented with min. Increased chewing with solids but she did not have her dentures in place but cleared without difficulties;       Pharyngeal Phase:   · No coughing, wet vocal quality or swallow delay noted.    Compensatory Strategies  · Basic swallow precautions reviewed with the patients.      Treatment: see above;  Pt was oriented to basic time and place.    Assessment:     Lizz Noguera is a 57 y.o. female who was evaluated today for possible swallowing difficulties. Pt with a medical history of diverticulitis of large intestine with " new fever with possible perforation and abscess. Pt. Does have 3 IR drains with serous output.   Pt evaluated bedside and did not present with any observable swallow difficulties with thin liquids, pureed or solids.  No coughing, wet vocal quality or swallow delays.  Pt and nursing reported no observable swallow difficulties.     A Modified Barium Swallow Study was ordered by physician.  S.T. Reported to FNP ([place]) that study would need to be cleared again by physician due to concerns regarding barium with pt diagnosis of possible perforated dicerticulitis and abscess.  FNP reported that pt to be started on new antibiotic and would put MBSS on hold at this time.  Pt also NPO after midnight due to to IR drains.    Goals:    SLP Goals        Problem: SLP Goal    Goal Priority Disciplines Outcome   SLP Goal     SLP    Description:  LTG:  Pt to tolerate least restrictive diet consistency safely and efficiently.    ST. BSA with full meal with goals to follow.             2.MBSS if indicated                    Plan:     · Patient to be seen:  5 x/week   · Plan of Care expires:     · Plan of Care reviewed with:  patient   · SLP Follow-Up:  Yes       Discharge recommendations:      Barriers to Discharge:      Time Tracking:     SLP Treatment Date:   07/15/18  Speech Start Time:  1245  Speech Stop Time:  1320     Speech Total Time (min):  35 min    Billable Minutes: 35 minutes    Camille Siegel CCC-SLP  07/15/2018

## 2018-07-15 NOTE — PLAN OF CARE
Problem: Patient Care Overview  Goal: Plan of Care Review  Outcome: Ongoing (interventions implemented as appropriate)  Pt had fever of 101.6 during shift. PRN Tylenol was given and brought fever WNL. Pt has active bowel sounds and has tolerated full liquid diet. Advanced to regular diet for AM. J/P drains CDI w/ small amount of green tinged purulent fluid. Flushed per orders. Pt has not complained of pain during shift. Chart reviewed. Will continue to monitor.

## 2018-07-15 NOTE — PLAN OF CARE
SW met with patient on the floor. Patient lives with her friend Hi Sena [phone].  Patient is independent with ADL's at home.  Patient has integrated provider list for home health service. (patient has not picked a home health agency yet).   Patient voiced no d/c concerns  No other anticipated needs at present. Case mgt to follow up with d/c needs.     07/15/18 4729   Discharge Assessment   Assessment Type Discharge Planning Assessment   Confirmed/corrected address and phone number on facesheet? Yes   Assessment information obtained from? Patient;Medical Record   Communicated expected length of stay with patient/caregiver yes   Current cognitive status: Alert/Oriented   Current Functional Status: Independent   Lives With significant other   Able to Return to Prior Arrangements yes   Is patient able to care for self after discharge? Yes   Patient's perception of discharge disposition home or selfcare;home health   Readmission Within The Last 30 Days no previous admission in last 30 days   Patient currently being followed by outpatient case management? No   Patient currently receives any other outside agency services? No   Equipment Currently Used at Home none   Do you have any problems affording any of your prescribed medications? No   Is the patient taking medications as prescribed? yes   Does the patient have transportation home? Yes   Transportation Available family or friend will provide   Does the patient receive services at the [facility]? No   Discharge Plan A Home with family;Home Health

## 2018-07-15 NOTE — SUBJECTIVE & OBJECTIVE
Interval History: febrile overnight continues to tolerate diet without any nausea, having bowel function, no abdominal pain    Medications:  Continuous Infusions:   sodium chloride 0.9% 100 mL/hr at 07/13/18 1952     Scheduled Meds:   buPROPion  150 mg Oral BID    cyproheptadine  4 mg Oral TID    darunavir-cobicistat  1 tablet Oral Daily    enalapril  20 mg Oral Daily    ferrous gluconate  324 mg Oral Daily    heparin (porcine)  5,000 Units Subcutaneous Q8H    linezolid 600mg/300ml  600 mg Intravenous Q12H    micafungin (MYCAMINE) IVPB  100 mg Intravenous Q24H    piperacillin-tazobactam (ZOSYN) IVPB  4.5 g Intravenous Q8H    potassium chloride  40 mEq Oral Once    rosuvastatin  20 mg Oral Daily    senna-docusate 8.6-50 mg  2 tablet Oral QHS     PRN Meds:acetaminophen, albuterol-ipratropium, dextrose 50%, dextrose 50%, fentaNYL, glucagon (human recombinant), glucose, glucose, HYDROcodone-acetaminophen, ibuprofen, insulin aspart U-100, midazolam, morphine, ondansetron, sodium chloride 0.9%     Review of patient's allergies indicates:   Allergen Reactions    Bactrim [sulfamethoxazole-trimethoprim] Rash     Objective:     Vital Signs (Most Recent):  Temp: 97.9 °F (36.6 °C) (07/15/18 0746)  Pulse: 86 (07/15/18 0746)  Resp: 19 (07/15/18 0746)  BP: 137/63 (07/15/18 0746)  SpO2: 96 % (07/15/18 0746) Vital Signs (24h Range):  Temp:  [97.9 °F (36.6 °C)-101.3 °F (38.5 °C)] 97.9 °F (36.6 °C)  Pulse:  [] 86  Resp:  [17-19] 19  SpO2:  [94 %-100 %] 96 %  BP: (125-144)/(59-67) 137/63     Weight: 60.6 kg (133 lb 9.6 oz)  Body mass index is 22.23 kg/m².    Intake/Output - Last 3 Shifts       07/13 0700 - 07/14 0659 07/14 0700 - 07/15 0659 07/15 0700 - 07/16 0659    P.O. 360 500     I.V. (mL/kg) 1975 (31.8) 2495 (41.2)     IV Piggyback 800 550     Total Intake(mL/kg) 3135 (50.4) 3545 (58.5)     Drains 65 55     Total Output 65 55      Net +3070 +3490             Urine Occurrence 6 x 3 x     Stool Occurrence 1 x             Physical Exam   Constitutional: She is oriented to person, place, and time. She appears well-developed and well-nourished.   HENT:   Head: Normocephalic and atraumatic.   Eyes: EOM are normal.   Cardiovascular: Normal rate and regular rhythm.    Pulmonary/Chest: Effort normal. No respiratory distress.   Abdominal: Soft. She exhibits no distension (Minimal soft). There is no tenderness.   Drains in place with serous drainage, 1 drain with slightly cloudy sediment   Musculoskeletal: Normal range of motion.   Neurological: She is alert and oriented to person, place, and time.   Skin: Skin is warm and dry.   Psychiatric: She has a normal mood and affect. Thought content normal.   Vitals reviewed.      Significant Labs:  CBC:     Recent Labs  Lab 07/15/18  0502   WBC 12.35   RBC 2.44*   HGB 7.6*   HCT 22.7*      MCV 93   MCH 31.1*   MCHC 33.5     CMP:     Recent Labs  Lab 07/15/18  0502   GLU 84   CALCIUM 9.2   ALBUMIN 2.2*   PROT 6.0      K 3.0*   CO2 23      BUN 2*   CREATININE 0.8   ALKPHOS 119   ALT 9*   AST 25   BILITOT 1.1*       Significant Diagnostics:    CT:  1. Worsening multilobar pneumonia.  2. Bilateral small effusions, right greater than left  3. Interval placement of pigtail drainage catheters within the 2 pelvic fluid collections with mild amount of fluid remaining in the cul-de-sac collection.  Mild enlargement of a small left lower anterior abdominal fluid collection from 2.1 to 2.3 cm which may represent a developing abscesses well.  4. Cholelithiasis.    Abdominal x-ray:  Slight improvement in air fluid levels and small bowel distention  I have reviewed all pertinent imaging results/findings within the past 24 hours.

## 2018-07-15 NOTE — ASSESSMENT & PLAN NOTE
- Sees Dr. Doe at [facility] outpatient  - CD [phone]  - Continue home medications

## 2018-07-15 NOTE — PT/OT/SLP PROGRESS
"Physical Therapy      Patient Name:  Lizz Noguera   MRN:  [id number]    Patient not seen today secondary to patient refuse, "I am full from eating"   . Will follow-up tomorrow. Communicate with nurse Jessenia.    Kymberly Kang PTA    "

## 2018-07-15 NOTE — PROGRESS NOTES
[facility] -   General Surgery  Progress Note    Subjective:     History of Present Illness:  Lizz Noguera is a 57 y.o. female patient with a PMHx of HIV, CVA with right sided residual weakness, HLP, depression. She initially presented to the ER for suprapubic abd pain and was found to have acute uncomplicated diverticulitis. She was admitted and treated with IV antibiotics. She initially improved, however yesterday she had a fever of 101 and increasing abdominal tenderness and distention. A CT was repeated and is concerning for perforated diverticulitis with abscess. General Surgery was consulted.    Post-Op Info:  * No surgery found *         Interval History: febrile overnight continues to tolerate diet without any nausea, having bowel function, no abdominal pain    Medications:  Continuous Infusions:   sodium chloride 0.9% 100 mL/hr at 07/13/18 1952     Scheduled Meds:   buPROPion  150 mg Oral BID    cyproheptadine  4 mg Oral TID    darunavir-cobicistat  1 tablet Oral Daily    enalapril  20 mg Oral Daily    ferrous gluconate  324 mg Oral Daily    heparin (porcine)  5,000 Units Subcutaneous Q8H    linezolid 600mg/300ml  600 mg Intravenous Q12H    micafungin (MYCAMINE) IVPB  100 mg Intravenous Q24H    piperacillin-tazobactam (ZOSYN) IVPB  4.5 g Intravenous Q8H    potassium chloride  40 mEq Oral Once    rosuvastatin  20 mg Oral Daily    senna-docusate 8.6-50 mg  2 tablet Oral QHS     PRN Meds:acetaminophen, albuterol-ipratropium, dextrose 50%, dextrose 50%, fentaNYL, glucagon (human recombinant), glucose, glucose, HYDROcodone-acetaminophen, ibuprofen, insulin aspart U-100, midazolam, morphine, ondansetron, sodium chloride 0.9%     Review of patient's allergies indicates:   Allergen Reactions    Bactrim [sulfamethoxazole-trimethoprim] Rash     Objective:     Vital Signs (Most Recent):  Temp: 97.9 °F (36.6 °C) (07/15/18 0746)  Pulse: 86 (07/15/18 0746)  Resp: 19 (07/15/18 0746)  BP:  137/63 (07/15/18 0746)  SpO2: 96 % (07/15/18 0746) Vital Signs (24h Range):  Temp:  [97.9 °F (36.6 °C)-101.3 °F (38.5 °C)] 97.9 °F (36.6 °C)  Pulse:  [] 86  Resp:  [17-19] 19  SpO2:  [94 %-100 %] 96 %  BP: (125-144)/(59-67) 137/63     Weight: 60.6 kg (133 lb 9.6 oz)  Body mass index is 22.23 kg/m².    Intake/Output - Last 3 Shifts       07/13 0700 - 07/14 0659 07/14 0700 - 07/15 0659 07/15 0700 - 07/16 0659    P.O. 360 500     I.V. (mL/kg) 1975 (31.8) 2495 (41.2)     IV Piggyback 800 550     Total Intake(mL/kg) 3135 (50.4) 3545 (58.5)     Drains 65 55     Total Output 65 55      Net +3070 +3490             Urine Occurrence 6 x 3 x     Stool Occurrence 1 x            Physical Exam   Constitutional: She is oriented to person, place, and time. She appears well-developed and well-nourished.   HENT:   Head: Normocephalic and atraumatic.   Eyes: EOM are normal.   Cardiovascular: Normal rate and regular rhythm.    Pulmonary/Chest: Effort normal. No respiratory distress.   Abdominal: Soft. She exhibits no distension (Minimal soft). There is no tenderness.   Drains in place with serous drainage, 1 drain with slightly cloudy sediment   Musculoskeletal: Normal range of motion.   Neurological: She is alert and oriented to person, place, and time.   Skin: Skin is warm and dry.   Psychiatric: She has a normal mood and affect. Thought content normal.   Vitals reviewed.      Significant Labs:  CBC:     Recent Labs  Lab 07/15/18  0502   WBC 12.35   RBC 2.44*   HGB 7.6*   HCT 22.7*      MCV 93   MCH 31.1*   MCHC 33.5     CMP:     Recent Labs  Lab 07/15/18  0502   GLU 84   CALCIUM 9.2   ALBUMIN 2.2*   PROT 6.0      K 3.0*   CO2 23      BUN 2*   CREATININE 0.8   ALKPHOS 119   ALT 9*   AST 25   BILITOT 1.1*       Significant Diagnostics:    CT:  1. Worsening multilobar pneumonia.  2. Bilateral small effusions, right greater than left  3. Interval placement of pigtail drainage catheters within the 2 pelvic fluid  collections with mild amount of fluid remaining in the cul-de-sac collection.  Mild enlargement of a small left lower anterior abdominal fluid collection from 2.1 to 2.3 cm which may represent a developing abscesses well.  4. Cholelithiasis.    Abdominal x-ray:  Slight improvement in air fluid levels and small bowel distention  I have reviewed all pertinent imaging results/findings within the past 24 hours.    Assessment/Plan:     * Diverticulitis of large intestine with abscess without bleeding    IR drains with serous output, flushed drains daily  Small increase in size of anterior fluid collection recommend IR drainage  NPO after midnight  Continue antibiotics  Look into home health for drain care  Tolerating diet  Ambulate  If does not improve with IR drainage and antibiotics may need surgery        Pneumonia due to infectious organism    Pulmonary toileting  Antibiotics            Bernard Colindres MD  General Surgery  [facility]

## 2018-07-15 NOTE — ASSESSMENT & PLAN NOTE
- Continues to have high grade fevers. Max temp of 102.6 overnight. Currently being treated for pneumonia and diverticulitis with abscess. S/p ct guided drainage of abscess with placement of JOSE drain x 2  - ID consulted -- recommended repeat CT chest/abdomen/pelvis due to continued fever  - Blood cultures with NGTD  - Wound culture with no growth    07/14/18  - Max temp last night of 102.3. Multifactorial -- mulitlobar pneumonia and pelvic abscess. Both are currently being treated. Re consulted IR, repeat CT chest/abdomen/pelvis on 07/13/18 showed mild enlargement of a small left lower anterior abdominal fluid collection from 2.1 to 2.3 cm which may represent a developing abscesses. May be able to aspirate  - Modified barium swallow for possible aspiration pneumonia. If unable to get swallow study over the weekend, will have SLP evaluate  - Blood cultures with NGTD  - Aerobic culture from anterior pelvis -- candida glabrata  - Aerobic culture from posterior pelvis -- enterococcus avivia, susceptibilities pending  - ID following  - Continue IV Vancomycin and Zosyn    07/15/18  - Due to (+) wound cultures, Vancomycin d/cd. IV Zyvox and Micafungin added. Zosyn continued  - Wound cultures (+) enterococcus avium, susceptibilities pending and candida glabrata  - IR consulted for aspiration of small collection of fluid to anterior abdomen

## 2018-07-15 NOTE — PROGRESS NOTES
[facility] Hospital Medicine  Progress Note    Patient Name: Lizz Noguera  MRN: [id number]  Patient Class: IP- Inpatient   Admission Date: 7/7/2018  Length of Stay: 8 days  Attending Physician: David Barnes MD  Primary Care Provider: Joseluis Doe MD (Inactive)        Subjective:     Principal Problem:Diverticulitis of large intestine with abscess without bleeding    HPI:  Lizz Noguera is a 57 y.o. female patient with a PMHx of HIV, CVA with right sided residual weakness, HLP, depression, who presented to the ER for suprapubic abd pain which onset suddenly this AM. Associated sxs included constipation. Patient denies any n/v/d, hematochezia, dysuria, hematuria, difficulty urinating, frequency, fever, chills, and all other sxs at this time. Pt denies any history of abdominal surgeries. She still has her appendix. CT scan showed uncomplicated diverticulitis of the sigmoid without abscess. Blood cultures pending. In ER, Temp 103.7, glucose 148, lactic acid 2.5. She is admitted with diverticulitis on IV flagyl and IV cipro.     Hospital Course:  Ms Noguera is a 57 year old female with PMHx HIV, CVA, HLP who presented to complaints of lower abdominal pain. Associated symptoms include constipation. CT scan of the abdomen showed uncomplicated diverticulitis of the sigmoid without abscess. She is currently on Cipro IV and Flagyl IV. She received Senna-docusate and Miralax on yesterday with no BM results. She continues to complain of constipation. Will give mag citrate X 1 and monitor.    7/9/2018- Patient seen and examined today. She is not having abdomen pain and is not constipation. Patient not able to eat regular diet this afternoon. Vital signs stable. Continuing Cipro IV and Flagyl IV.     07/10/18 -- Fever overnight of 101.4. Blood cultures (+) gram negative rods, BACTEROIDES VULGATUS. Susceptibilities pending. Pt currently receiving IV Zosyn and Cipro. Diet advanced, tolerated without  "abdominal pain, N/V. Pt reports, "my stomach is bigger than normal, I can barely see my belly button." Mild distension noted. Will obtain abdominal US to assess further.    07/11/18 -- Due to fever and abdominal distension yesterday obtained repeat CT. CT abdomen/pelvis with contrast showed anterior pelvic abscess measuring 8.5 cm with several foci of free air adjacent to the sigmoid colon in the mesentery and liver likely from perforated diverticulum. General surgery consulted and recommended ct guided abscess drainage to be performed by IR. Antibiotics changed to IV Zosyn only. Repeat blood cultures with NGTD.     07/12/18 -- S/p ct guided drainage of abscess, cultures sent which are currently pending. Has 2 JOSE drains placed by IR. Max temp of 101.2 overnight. Repeat blood cultures NGTD. Pt has been started on clear liquid diet to due repeat abdominal xray results this afternoon -- prominent loops of small bowel are seen throughout the abdomen. May be related to bowel dysfunction secondary to diverticular abscess. General surgery following.     07/13/18 -- Max temp of 102.6 overnight. Discussed case with ID who recommended repeat CT chest/abdomen/pelvis due to continued high grade fevers. Abdominal pain is improving, reporting bowel movements are becoming more formed. Per General surgery, may still require sigmoid resection for diverticulitis however there is also the possibility that she could develop a small-bowel obstruction and this will be monitored    07/14/18 -- Max temp of 102.3 last night. Repeat CT chest/abdomen/pelvis with contrast showed worsening multilobar pneumonia and mild enlargement of a small left lower anterior abdominal fluid collection from 2.1 to 2.3 cm which may represent a developing abscesses. General surgery following. Discussed case with Dr. Colindres -- continues to have fever, could be from pneumonia and/or abscess. Recommended consulting IR and see if the abscess could be aspirated. No " need for surgical procedure at this time. Will get modified barium swallow study and SLP evaluation for possible aspiration.     07/15/18 -- Max temp of 101.3 overnight. Antibiotics changed last night due to wound cultures (+) enterococcus avium (susceptibilities pending) and candida glabrata. Vancomycin d/c. IV Zyvox and Micafungin added by ID. IV Zosyn continued. NPO after midnight -- small increase in size of anterior fluid collection.  IR consulted to drain tomorrow morning    Interval History: Pt seen and examined. Pt feeling better. Pt denies abdominal pain, N/V. Pt reports BMs becoming more formed. Tolerating advanced diet.     Review of Systems   Constitutional: Positive for activity change and fever.   HENT: Negative.    Eyes: Negative.    Respiratory: Negative for apnea, cough, choking, chest tightness, shortness of breath, wheezing and stridor.    Cardiovascular: Negative for chest pain, palpitations and leg swelling.   Gastrointestinal: Positive for abdominal distention, abdominal pain and diarrhea. Negative for constipation, nausea and vomiting.   Endocrine: Negative.    Genitourinary: Negative.    Musculoskeletal: Negative.    Skin: Negative.    Allergic/Immunologic: Negative.    Neurological: Negative for dizziness, tremors, seizures, syncope, facial asymmetry, speech difficulty, weakness, light-headedness, numbness and headaches.   Hematological: Negative.    Psychiatric/Behavioral: Negative.      Objective:     Vital Signs (Most Recent):  Temp: 99.7 °F (37.6 °C) (07/15/18 1214)  Pulse: 87 (07/15/18 1214)  Resp: 18 (07/15/18 1214)  BP: (!) 146/68 (07/15/18 1214)  SpO2: (!) 94 % (07/15/18 1214) Vital Signs (24h Range):  Temp:  [97.9 °F (36.6 °C)-101.3 °F (38.5 °C)] 99.7 °F (37.6 °C)  Pulse:  [] 87  Resp:  [17-19] 18  SpO2:  [94 %-100 %] 94 %  BP: (125-146)/(59-68) 146/68     Weight: 60.6 kg (133 lb 9.6 oz)  Body mass index is 22.23 kg/m².    Intake/Output Summary (Last 24 hours) at 07/15/18  1301  Last data filed at 07/15/18 0542   Gross per 24 hour   Intake             3365 ml   Output               55 ml   Net             3310 ml      Physical Exam   Constitutional: She is oriented to person, place, and time. She appears well-developed. She is cooperative. She is easily aroused. No distress.   HENT:   Head: Normocephalic and atraumatic.   Eyes: EOM are normal.   Neck: Normal range of motion. Neck supple.   Cardiovascular: Normal rate, regular rhythm, normal heart sounds and intact distal pulses.    No murmur heard.  Pulmonary/Chest: Effort normal and breath sounds normal. No respiratory distress. She has no wheezes. She has no rales. She exhibits no tenderness.   Abdominal: Soft. Bowel sounds are normal. She exhibits no distension. There is no tenderness. There is no rigidity, no rebound, no guarding and no CVA tenderness.   Abdominal dressing C/D/I  JOSE drains x 2 -- scant serous fluid noted   Genitourinary:   Genitourinary Comments: Deferred   Musculoskeletal: Normal range of motion.   Neurological: She is alert, oriented to person, place, and time and easily aroused. No sensory deficit.   Skin: Skin is warm and dry. Capillary refill takes less than 2 seconds.   Psychiatric: She has a normal mood and affect. Her behavior is normal. Judgment and thought content normal.   Nursing note and vitals reviewed.      Significant Labs:   CBC:   Recent Labs  Lab 07/14/18  0621 07/15/18  0502   WBC 10.54 12.35   HGB 7.8* 7.6*   HCT 23.4* 22.7*    334     CMP:   Recent Labs  Lab 07/14/18  0621 07/14/18  1517 07/15/18  0502    139 140   K 2.9* 3.2* 3.0*    109 110   CO2 23 21* 23   GLU 85 95 84   BUN 2* 2* 2*   CREATININE 0.8 0.8 0.8   CALCIUM 9.1 9.2 9.2   PROT 6.0  --  6.0   ALBUMIN 2.4*  --  2.2*   BILITOT 1.2*  --  1.1*   ALKPHOS 121  --  119   AST 25  --  25   ALT 10  --  9*   ANIONGAP 7* 9 7*   EGFRNONAA >60 >60 >60     Magnesium:   Recent Labs  Lab 07/14/18  0621 07/15/18  0502   MG 1.8  1.8       Significant Imaging:   Imaging Results          CT Abdomen Pelvis With Contrast (Final result)  Result time 07/07/18 11:30:11    Final result by Arpan Del Castillo MD (07/07/18 11:30:11)                 Impression:      1.  Significant thickening of the mid sigmoid colon associated with diverticula, with surrounding edema.  Associated ascites in the cul-de-sac and around the liver.  Findings are concerning for uncomplicated diverticulitis of the sigmoid colon.  Negative for free air or focal drainable fluid collection.    2. Negative for acute process involving the abdomen or pelvis otherwise.  Normal appendix.  Negative for renal stone disease or hydronephrosis.    3.  Moderate to severe multifactorial spinal canal stenosis at L4/L5 and L5/S1.  Clinical correlation is advised.    4.  Avascular necrosis changes to the left greater than right hip joints.  Clinical correlation is advised.    5.  Cholelithiasis.  Small fat filled umbilical hernia.  Other nonemergent findings as described above.    All CT scans at this facility used dose modulation, iterative reconstruction, and/or weight based dosing when appropriate to reduce radiation dose to as low as reasonably achievable.      Electronically signed by: Arpan Del Castillo MD  Date:    07/07/2018  Time:    11:30             Narrative:    EXAMINATION:  CT ABDOMEN PELVIS WITH CONTRAST    CLINICAL HISTORY:  Lower abdominal pain;    TECHNIQUE:  Axial images through the abdomen and pelvis were obtained with the use of IV contrast.  Sagittal and coronal reconstructions are provided for review.  Oral contrast was not utilized.    COMPARISON:  None    FINDINGS:  No comparison studies are available.    LUNG BASES:   Lung bases are clear.  Negative for pleural or pericardial effusions. The distal esophagus is normal.    ABDOMEN: Gallstones.  The liver, spleen and gallbladder otherwise appear normal.  The pancreas is unremarkable.  Kidneys and adrenal glands are  normal.    Negative for adenopathy noted within the abdomen or pelvis.  Vascular calcifications are present without aneurysmal changes. Portal vein is patent.    The stomach and small bowel appear normal.  There is significant edema within sigmoid colon with associated diverticula.  With moderate surrounding edema.  Mild to moderate ascites in the cul-de-sac and mild ascites around the liver.  The rest of the colon appears normal.  A normal appendix.    PELVIS: The urinary bladder is unremarkable.    The female pelvic organs are normal. There are pelvic phleboliths.    No significant osseous abnormality is identified.  Broad-based disc bulge at L4/L5 and L5/S1 with moderate to severe multifactorial spinal canal stenosis.  There are significant degenerative changes a changes of the femoral heads.  Osteopenia.    Negative for groin adenopathy.    Tiny fat filled umbilical hernia.  The abdominal wall is otherwise intact.                               X-Ray Chest AP Portable (Final result)  Result time 07/07/18 10:18:44    Final result by Arpan Del Castillo MD (07/07/18 10:18:44)                 Impression:      1.  Negative for acute process involving the chest, considering there are lower lung volumes on today's study.    2.  Stable findings as noted above.      Electronically signed by: Arpan Del Castillo MD  Date:    07/07/2018  Time:    10:18             Narrative:    EXAMINATION:  XR CHEST AP PORTABLE    CLINICAL HISTORY:  Sepsis;    COMPARISON:  2 June 2014    FINDINGS:  EKG leads overlie the chest.  Mildly low lung volumes.  The lungs are otherwise clear.  The cardiac silhouette size is normal. The trachea is midline and the mediastinal width is normal. Negative for focal infiltrate, effusion or pneumothorax. Pulmonary vasculature is normal. Negative for osseous abnormalities. Tortuous aorta.  Convex left curvature of the lower thoracic spine.  Stable calcifications within the central portions of both humeri, possibly  bone infarctions.                              Assessment/Plan:      * Diverticulitis of large intestine with abscess without bleeding    - Initial CT scan upon admit showed diverticulitis without abscess. Pt was treated for diverticulitis initially with IV Cipro and Flagyl  - Blood cultures with gram negative bacteremia, BACTEROIDES VULGATUS. Normally susceptible to Flagyl and Zosyn  - Repeat blood cultures with NGTD  - Due to fever (101.4) and abdominal distension yesterday, 07/10/18, obtained CT chest/abdomen/pelvis with contrast which showed anterior pelvic abscess measuring 8.5 cm with several foci of free air adjacent to the sigmoid colon in the mesentery and liver likely from perforated diverticulum  - General surgery consulted and recommended ct guided abscess drainage -- performed by IR -- JOSE drains x 2  - Aerobic culture from anterior pelvis -- candida glabrata  - Aerobic culture from posterior pelvis -- enterococcus avivia, susceptibilities pending  - Continue IV Vancomycin and Zosyn    07/14/18  - Repeat CT chest/abdomen/pelvis with contrast showed mild enlargement of a small left lower anterior abdominal fluid collection from 2.1 to 2.3 cm which may represent a developing abscesses  - Discussed with Dr. Colindres (general surgery) -- due to continued fever, which could be also due to pneumonia, consult IR to see if they may be able to aspirate from that area    07/15/18  - Wound cultures (+) enterococcus avium, susceptibilities pending and candida glabrata  - Due to (+) wound cultures, Vancomycin d/cd. IV Zyvox and Micafungin added by ID. Zosyn continued  - IR consulted for aspiration of small collection of fluid to anterior abdomen. NPO after midnight        Gram-negative bacteremia    - Blood cultures with gram negative bacteremia, BACTEROIDES VULGATUS. Normally susceptible to Zosyn or Flagyl  - Cipro/Flagyl discontinued today due to CT findings. Pt had a total of 4 days of Flagyl before discontinuation    - Repeat blood cultures with NGTD -- appears to have resolved  - ID consulted for recommendations of antibiotics due to bacteremia upon discharge  - Currently on IV Zosyn          Hypertension    - BP stable  - Continue Vasotec  - Will continue to monitor and adjust medications as needed          Fever    - Continues to have high grade fevers. Max temp of 102.6 overnight. Currently being treated for pneumonia and diverticulitis with abscess. S/p ct guided drainage of abscess with placement of JOSE drain x 2  - ID consulted -- recommended repeat CT chest/abdomen/pelvis due to continued fever  - Blood cultures with NGTD  - Wound culture with no growth    07/14/18  - Max temp last night of 102.3. Multifactorial -- mulitlobar pneumonia and pelvic abscess. Both are currently being treated. Re consulted IR, repeat CT chest/abdomen/pelvis on 07/13/18 showed mild enlargement of a small left lower anterior abdominal fluid collection from 2.1 to 2.3 cm which may represent a developing abscesses. May be able to aspirate  - Modified barium swallow for possible aspiration pneumonia. If unable to get swallow study over the weekend, will have SLP evaluate  - Blood cultures with NGTD  - Aerobic culture from anterior pelvis -- candida glabrata  - Aerobic culture from posterior pelvis -- enterococcus avivia, susceptibilities pending  - ID following  - Continue IV Vancomycin and Zosyn    07/15/18  - Due to (+) wound cultures, Vancomycin d/cd. IV Zyvox and Micafungin added. Zosyn continued  - Wound cultures (+) enterococcus avium, susceptibilities pending and candida glabrata  - IR consulted for aspiration of small collection of fluid to anterior abdomen          Pneumonia due to infectious organism    - CT chest/abdomen/pelvis with contrast showed multilobar infiltrates with small right effusion concerning for pneumonia  - CXR showed no infiltrates and CT abdomen/pelvis with contrast negative for infiltrates at lung bases -- both  obtained on admit  - No WBC count. Hx of HIV, will obtain CD 4 count -- 339. Will continue IV Vancomycin and Zosyn  - Repeat blood cultures with NGTD   - Supplemental oxygen prn, keep O2 sats > 92%  - Duonebs prn    07/14/18  - CT chest/abdomen/pelvis showed worsening multilobar pneumonia. Pt currently on IV Vancomycin and Zosyn. Will obtain modified barium swallow for possible aspiration and will consult SLP for evaluation if unable to get swallow study over the weekend    07/15/18   - SLP to evaluate and treat  - Modified barium swallow test pending for suspected aspiration  - Continue IV Zosyn. Blood cultures with NGTD          Hypokalemia    - K+ 3.0  - Magnesium level unremarkable  - KCL 40 mEq PO x 1  - Daily BMP            HIV (human immunodeficiency virus infection)    - Sees Dr. Doe at [facility]  - CD 4 339  - Continue home medications          Tobacco abuse    - Smoking cessation counseling  - Nicotine patch              VTE Risk Mitigation         Ordered     heparin (porcine) injection 5,000 Units  Every 8 hours      07/13/18 1741     IP VTE LOW RISK PATIENT  Once      07/07/18 1357     Place sequential compression device  Until discontinued      07/07/18 1357              ALIS Barton  Department of Hospital Medicine   [facility]

## 2018-07-15 NOTE — ASSESSMENT & PLAN NOTE
- Blood cultures with gram negative bacteremia, BACTEROIDES VULGATUS. Normally susceptible to Zosyn or Flagyl  - Cipro/Flagyl discontinued today due to CT findings. Pt had a total of 4 days of Flagyl before discontinuation   - Repeat blood cultures with NGTD -- appears to have resolved  - ID consulted for recommendations of antibiotics due to bacteremia upon discharge  - Currently on IV Zosyn

## 2018-07-15 NOTE — ASSESSMENT & PLAN NOTE
- Initial CT scan upon admit showed diverticulitis without abscess. Pt was treated for diverticulitis initially with IV Cipro and Flagyl  - Blood cultures with gram negative bacteremia, BACTEROIDES VULGATUS. Normally susceptible to Flagyl and Zosyn  - Repeat blood cultures with NGTD  - Due to fever (101.4) and abdominal distension yesterday, 07/10/18, obtained CT chest/abdomen/pelvis with contrast which showed anterior pelvic abscess measuring 8.5 cm with several foci of free air adjacent to the sigmoid colon in the mesentery and liver likely from perforated diverticulum  - General surgery consulted and recommended ct guided abscess drainage -- performed by IR -- JOSE drains x 2  - Aerobic culture from anterior pelvis -- candida glabrata  - Aerobic culture from posterior pelvis -- enterococcus avivia, susceptibilities pending  - Continue IV Vancomycin and Zosyn    07/14/18  - Repeat CT chest/abdomen/pelvis with contrast showed mild enlargement of a small left lower anterior abdominal fluid collection from 2.1 to 2.3 cm which may represent a developing abscesses  - Discussed with Dr. Colindres (general surgery) -- due to continued fever, which could be also due to pneumonia, consult IR to see if they may be able to aspirate from that area    07/15/18  - Wound cultures (+) enterococcus avium, susceptibilities pending and candida glabrata  - Due to (+) wound cultures, Vancomycin d/cd. IV Zyvox and Micafungin added by ID. Zosyn continued  - IR consulted for aspiration of small collection of fluid to anterior abdomen. NPO after midnight

## 2018-07-15 NOTE — ASSESSMENT & PLAN NOTE
S/p insertion of pigtail catheter -with persistent fever ,   Abdominal cultures- enterococcus , candida Glabrata .  Will add zyvox/micafungin .  Follow final drug susceptibility of enterococus

## 2018-07-15 NOTE — SUBJECTIVE & OBJECTIVE
Interval History: Pt seen and examined. Pt feeling better. Pt denies abdominal pain, N/V. Pt reports BMs becoming more formed. Tolerating advanced diet.     Review of Systems   Constitutional: Positive for activity change and fever.   HENT: Negative.    Eyes: Negative.    Respiratory: Negative for apnea, cough, choking, chest tightness, shortness of breath, wheezing and stridor.    Cardiovascular: Negative for chest pain, palpitations and leg swelling.   Gastrointestinal: Positive for abdominal distention, abdominal pain and diarrhea. Negative for constipation, nausea and vomiting.   Endocrine: Negative.    Genitourinary: Negative.    Musculoskeletal: Negative.    Skin: Negative.    Allergic/Immunologic: Negative.    Neurological: Negative for dizziness, tremors, seizures, syncope, facial asymmetry, speech difficulty, weakness, light-headedness, numbness and headaches.   Hematological: Negative.    Psychiatric/Behavioral: Negative.      Objective:     Vital Signs (Most Recent):  Temp: 99.7 °F (37.6 °C) (07/15/18 1214)  Pulse: 87 (07/15/18 1214)  Resp: 18 (07/15/18 1214)  BP: (!) 146/68 (07/15/18 1214)  SpO2: (!) 94 % (07/15/18 1214) Vital Signs (24h Range):  Temp:  [97.9 °F (36.6 °C)-101.3 °F (38.5 °C)] 99.7 °F (37.6 °C)  Pulse:  [] 87  Resp:  [17-19] 18  SpO2:  [94 %-100 %] 94 %  BP: (125-146)/(59-68) 146/68     Weight: 60.6 kg (133 lb 9.6 oz)  Body mass index is 22.23 kg/m².    Intake/Output Summary (Last 24 hours) at 07/15/18 1301  Last data filed at 07/15/18 0542   Gross per 24 hour   Intake             3365 ml   Output               55 ml   Net             3310 ml      Physical Exam   Constitutional: She is oriented to person, place, and time. She appears well-developed. She is cooperative. She is easily aroused. No distress.   HENT:   Head: Normocephalic and atraumatic.   Eyes: EOM are normal.   Neck: Normal range of motion. Neck supple.   Cardiovascular: Normal rate, regular rhythm, normal heart sounds  and intact distal pulses.    No murmur heard.  Pulmonary/Chest: Effort normal and breath sounds normal. No respiratory distress. She has no wheezes. She has no rales. She exhibits no tenderness.   Abdominal: Soft. Bowel sounds are normal. She exhibits no distension. There is no tenderness. There is no rigidity, no rebound, no guarding and no CVA tenderness.   Abdominal dressing C/D/I  JOSE drains x 2 -- scant serous fluid noted   Genitourinary:   Genitourinary Comments: Deferred   Musculoskeletal: Normal range of motion.   Neurological: She is alert, oriented to person, place, and time and easily aroused. No sensory deficit.   Skin: Skin is warm and dry. Capillary refill takes less than 2 seconds.   Psychiatric: She has a normal mood and affect. Her behavior is normal. Judgment and thought content normal.   Nursing note and vitals reviewed.      Significant Labs:   CBC:   Recent Labs  Lab 07/14/18  0621 07/15/18  0502   WBC 10.54 12.35   HGB 7.8* 7.6*   HCT 23.4* 22.7*    334     CMP:   Recent Labs  Lab 07/14/18  0621 07/14/18  1517 07/15/18  0502    139 140   K 2.9* 3.2* 3.0*    109 110   CO2 23 21* 23   GLU 85 95 84   BUN 2* 2* 2*   CREATININE 0.8 0.8 0.8   CALCIUM 9.1 9.2 9.2   PROT 6.0  --  6.0   ALBUMIN 2.4*  --  2.2*   BILITOT 1.2*  --  1.1*   ALKPHOS 121  --  119   AST 25  --  25   ALT 10  --  9*   ANIONGAP 7* 9 7*   EGFRNONAA >60 >60 >60     Magnesium:   Recent Labs  Lab 07/14/18  0621 07/15/18  0502   MG 1.8 1.8       Significant Imaging:   Imaging Results          CT Abdomen Pelvis With Contrast (Final result)  Result time 07/07/18 11:30:11    Final result by Arpan Del Castillo MD (07/07/18 11:30:11)                 Impression:      1.  Significant thickening of the mid sigmoid colon associated with diverticula, with surrounding edema.  Associated ascites in the cul-de-sac and around the liver.  Findings are concerning for uncomplicated diverticulitis of the sigmoid colon.  Negative for free  air or focal drainable fluid collection.    2. Negative for acute process involving the abdomen or pelvis otherwise.  Normal appendix.  Negative for renal stone disease or hydronephrosis.    3.  Moderate to severe multifactorial spinal canal stenosis at L4/L5 and L5/S1.  Clinical correlation is advised.    4.  Avascular necrosis changes to the left greater than right hip joints.  Clinical correlation is advised.    5.  Cholelithiasis.  Small fat filled umbilical hernia.  Other nonemergent findings as described above.    All CT scans at this facility used dose modulation, iterative reconstruction, and/or weight based dosing when appropriate to reduce radiation dose to as low as reasonably achievable.      Electronically signed by: Arpan Del Castillo MD  Date:    07/07/2018  Time:    11:30             Narrative:    EXAMINATION:  CT ABDOMEN PELVIS WITH CONTRAST    CLINICAL HISTORY:  Lower abdominal pain;    TECHNIQUE:  Axial images through the abdomen and pelvis were obtained with the use of IV contrast.  Sagittal and coronal reconstructions are provided for review.  Oral contrast was not utilized.    COMPARISON:  None    FINDINGS:  No comparison studies are available.    LUNG BASES:   Lung bases are clear.  Negative for pleural or pericardial effusions. The distal esophagus is normal.    ABDOMEN: Gallstones.  The liver, spleen and gallbladder otherwise appear normal.  The pancreas is unremarkable.  Kidneys and adrenal glands are normal.    Negative for adenopathy noted within the abdomen or pelvis.  Vascular calcifications are present without aneurysmal changes. Portal vein is patent.    The stomach and small bowel appear normal.  There is significant edema within sigmoid colon with associated diverticula.  With moderate surrounding edema.  Mild to moderate ascites in the cul-de-sac and mild ascites around the liver.  The rest of the colon appears normal.  A normal appendix.    PELVIS: The urinary bladder is unremarkable.     The female pelvic organs are normal. There are pelvic phleboliths.    No significant osseous abnormality is identified.  Broad-based disc bulge at L4/L5 and L5/S1 with moderate to severe multifactorial spinal canal stenosis.  There are significant degenerative changes a changes of the femoral heads.  Osteopenia.    Negative for groin adenopathy.    Tiny fat filled umbilical hernia.  The abdominal wall is otherwise intact.                               X-Ray Chest AP Portable (Final result)  Result time 07/07/18 10:18:44    Final result by Arpan Del Castillo MD (07/07/18 10:18:44)                 Impression:      1.  Negative for acute process involving the chest, considering there are lower lung volumes on today's study.    2.  Stable findings as noted above.      Electronically signed by: Arpan Del Castillo MD  Date:    07/07/2018  Time:    10:18             Narrative:    EXAMINATION:  XR CHEST AP PORTABLE    CLINICAL HISTORY:  Sepsis;    COMPARISON:  2 June 2014    FINDINGS:  EKG leads overlie the chest.  Mildly low lung volumes.  The lungs are otherwise clear.  The cardiac silhouette size is normal. The trachea is midline and the mediastinal width is normal. Negative for focal infiltrate, effusion or pneumothorax. Pulmonary vasculature is normal. Negative for osseous abnormalities. Tortuous aorta.  Convex left curvature of the lower thoracic spine.  Stable calcifications within the central portions of both humeri, possibly bone infarctions.

## 2018-07-15 NOTE — PLAN OF CARE
Problem: Patient Care Overview  Goal: Plan of Care Review  Outcome: Ongoing (interventions implemented as appropriate)  Fall precautions maintained. Pt free from falls/injuries.  Patient denies any complains of pain at this time.   Antibiotics given as prescribed.  Ambulates and repositions independently.   Plan of care and medications discussed with patient.  Patient verbalized understanding.  Bed locked and low, call bell within reach.  Chart check done. Will continue to monitor.

## 2018-07-15 NOTE — PLAN OF CARE
Problem: SLP Goal  Goal: SLP Goal  LTG:  Pt to tolerate least restrictive diet consistency safely and efficiently.    ST. BSA with full meal with goals to follow.             2.MBSS if indicated      S.T./Swallowing evaluation completed.  Please see eval for full report.

## 2018-07-15 NOTE — PROGRESS NOTES
[facility]  Infectious Disease  Progress Note    Patient Name: Lizz Noguera  MRN: [id number]  Admission Date: 7/7/2018  Length of Stay: 8 days  Attending Physician: David Barnes MD  Primary Care Provider: Joseluis Doe MD (Inactive)    Isolation Status: No active isolations  Assessment/Plan:      * Diverticulitis of large intestine with abscess without bleeding    S/p insertion of pigtail catheter -with persistent fever ,   Abdominal cultures- enterococcus , candida Glabrata .  Will add zyvox/micafungin .  Follow final drug susceptibility of enterococus        Pneumonia due to infectious organism    Will continue zosyn,do swallow evaluation         Gram-negative bacteremia    Isolate is bacteroides -will continue zosyn        HIV (human immunodeficiency virus infection)    Will continue darunavir /truvada .  CD4 COUNT             Anticipated Disposition:     Thank you for your consult. I will follow-up with patient. Please contact us if you have any additional questions.    Noble Neal MD  Infectious Disease  [facility]    Subjective:     Principal Problem:Diverticulitis of large intestine with abscess without bleeding    HPI: 57 year old woman with history of  HIV admitted with diverticulitis .  Latest cd4 count -339.  Ct scan of the abdomen showed -  1.  Multilobar infiltrates with small right effusion concerning for pneumonia    2.    Anterior pelvic abscess measuring 8.5 cm with several foci of free air adjacent to the sigmoid colon in the mesentery and liver likely from perforated diverticulum.    She had interval insertion of pigtail catheter and drainage . She has remained febrile .  Repeat CT scan of the chest ,abdomen showed -1. Worsening multilobar pneumonia.  2. Bilateral small effusions, right greater than left  3. Interval placement of pigtail drainage catheters within the 2 pelvic fluid collections with mild amount of fluid remaining in the cul-de-sac  collection.  Mild enlargement of a small left lower anterior abdominal fluid collection from 2.1 to 2.3 cm which may represent a developing abscesses well.  4. Cholelithiasis.  T max is 102.3  Interval History: 57 year old woman with perforated diverticulitis .  Repeat abdominal CT scan showed - . Worsening multilobar pneumonia.  2. Bilateral small effusions, right greater than left  3. Interval placement of pigtail drainage catheters within the 2 pelvic fluid collections with mild amount of fluid remaining in the cul-de-sac collection.  Mild enlargement of a small left lower anterior abdominal fluid collection from 2.1 to 2.3 cm which may represent a developing abscesses well.  Abdominal cultures- enterococcus avium/yolande glabrata   T max -101.  Review of Systems   Constitutional: Positive for activity change and fever.   HENT: Negative.    Eyes: Negative.    Respiratory: Negative for apnea, cough, choking, chest tightness, shortness of breath, wheezing and stridor.    Cardiovascular: Negative for chest pain, palpitations and leg swelling.   Gastrointestinal: Positive for abdominal distention, abdominal pain and diarrhea. Negative for constipation, nausea and vomiting.   Endocrine: Negative.    Genitourinary: Negative.    Musculoskeletal: Negative.    Skin: Negative.    Allergic/Immunologic: Negative.    Neurological: Negative for dizziness, tremors, seizures, syncope, facial asymmetry, speech difficulty, weakness, light-headedness, numbness and headaches.   Hematological: Negative.    Psychiatric/Behavioral: Negative.      Objective:     Vital Signs (Most Recent):  Temp: 99.1 °F (37.3 °C) (07/15/18 0312)  Pulse: 89 (07/15/18 0312)  Resp: 18 (07/15/18 0312)  BP: (!) 125/59 (07/15/18 0312)  SpO2: 96 % (07/15/18 0312) Vital Signs (24h Range):  Temp:  [98.1 °F (36.7 °C)-101.3 °F (38.5 °C)] 99.1 °F (37.3 °C)  Pulse:  [] 89  Resp:  [17-18] 18  SpO2:  [94 %-100 %] 96 %  BP: (119-144)/(55-67) 125/59     Weight:  60.6 kg (133 lb 9.6 oz)  Body mass index is 22.23 kg/m².    Estimated Creatinine Clearance: 68.1 mL/min (based on SCr of 0.8 mg/dL).    Physical Exam   Constitutional: She is oriented to person, place, and time. She appears well-developed. She is cooperative. She is easily aroused. No distress.   HENT:   Head: Normocephalic and atraumatic.   Eyes: EOM are normal.   Neck: Normal range of motion. Neck supple.   Cardiovascular: Normal rate, regular rhythm, normal heart sounds and intact distal pulses.    No murmur heard.  Pulmonary/Chest: Effort normal and breath sounds normal. No respiratory distress. She has no wheezes. She has no rales. She exhibits no tenderness.   Abdominal: Soft. Bowel sounds are normal. She exhibits no distension. There is tenderness in the right lower quadrant. There is no rigidity, no rebound, no guarding and no CVA tenderness.   Abdominal dressing C/D/I  JOSE drains x 2    Genitourinary:   Genitourinary Comments: Deferred   Musculoskeletal: Normal range of motion.   Neurological: She is alert, oriented to person, place, and time and easily aroused. No sensory deficit.   Skin: Skin is warm and dry. Capillary refill takes less than 2 seconds.   Psychiatric: She has a normal mood and affect. Her behavior is normal. Judgment and thought content normal.   Nursing note and vitals reviewed.      Significant Labs:   Blood Culture:   Recent Labs  Lab 07/07/18  0945 07/07/18  0950 07/10/18  1625   LABBLOO Gram stain noemi bottle: Gram negative rods   Positive results previously called 07/08/2018  21:00  BACTEROIDES VULGATUS Gram stain noemi bottle: Gram negative rods   Results called to and read back by: Leroy Yang LPN  07/08/2018  19:24  BACTEROIDES VULGATUS No Growth to date  No Growth to date  No Growth to date  No Growth to date  No Growth to date  No Growth to date  No Growth to date  No Growth to date  No Growth to date  No Growth to date     BMP:   Recent Labs  Lab 07/14/18  0621  07/14/18  1517   GLU 85 95    139   K 2.9* 3.2*    109   CO2 23 21*   BUN 2* 2*   CREATININE 0.8 0.8   CALCIUM 9.1 9.2   MG 1.8  --      CBC:   Recent Labs  Lab 07/14/18  0621   WBC 10.54   HGB 7.8*   HCT 23.4*        Wound Culture:   Recent Labs  Lab 07/11/18  1640   LABAERO ENTEROCOCCUS  AVIUMFewIdentification and susceptibility pending  AGUEDA GLABRATAMany     All pertinent labs within the past 24 hours have been reviewed.    Significant Imaging: I have reviewed all pertinent imaging results/findings within the past 24 hours.

## 2018-07-15 NOTE — ASSESSMENT & PLAN NOTE
IR drains with serous output, flushed drains daily  Small increase in size of anterior fluid collection recommend IR drainage  NPO after midnight  Continue antibiotics  Look into home health for drain care  Tolerating diet  Ambulate  If does not improve with IR drainage and antibiotics may need surgery

## 2018-07-15 NOTE — SUBJECTIVE & OBJECTIVE
Interval History: 57 year old woman with perforated diverticulitis .  Repeat abdominal CT scan showed - . Worsening multilobar pneumonia.  2. Bilateral small effusions, right greater than left  3. Interval placement of pigtail drainage catheters within the 2 pelvic fluid collections with mild amount of fluid remaining in the cul-de-sac collection.  Mild enlargement of a small left lower anterior abdominal fluid collection from 2.1 to 2.3 cm which may represent a developing abscesses well.  Abdominal cultures- enterococcus avium/yolande glabrata   T max -101.  Review of Systems   Constitutional: Positive for activity change and fever.   HENT: Negative.    Eyes: Negative.    Respiratory: Negative for apnea, cough, choking, chest tightness, shortness of breath, wheezing and stridor.    Cardiovascular: Negative for chest pain, palpitations and leg swelling.   Gastrointestinal: Positive for abdominal distention, abdominal pain and diarrhea. Negative for constipation, nausea and vomiting.   Endocrine: Negative.    Genitourinary: Negative.    Musculoskeletal: Negative.    Skin: Negative.    Allergic/Immunologic: Negative.    Neurological: Negative for dizziness, tremors, seizures, syncope, facial asymmetry, speech difficulty, weakness, light-headedness, numbness and headaches.   Hematological: Negative.    Psychiatric/Behavioral: Negative.      Objective:     Vital Signs (Most Recent):  Temp: 99.1 °F (37.3 °C) (07/15/18 0312)  Pulse: 89 (07/15/18 0312)  Resp: 18 (07/15/18 0312)  BP: (!) 125/59 (07/15/18 0312)  SpO2: 96 % (07/15/18 0312) Vital Signs (24h Range):  Temp:  [98.1 °F (36.7 °C)-101.3 °F (38.5 °C)] 99.1 °F (37.3 °C)  Pulse:  [] 89  Resp:  [17-18] 18  SpO2:  [94 %-100 %] 96 %  BP: (119-144)/(55-67) 125/59     Weight: 60.6 kg (133 lb 9.6 oz)  Body mass index is 22.23 kg/m².    Estimated Creatinine Clearance: 68.1 mL/min (based on SCr of 0.8 mg/dL).    Physical Exam   Constitutional: She is oriented to person,  place, and time. She appears well-developed. She is cooperative. She is easily aroused. No distress.   HENT:   Head: Normocephalic and atraumatic.   Eyes: EOM are normal.   Neck: Normal range of motion. Neck supple.   Cardiovascular: Normal rate, regular rhythm, normal heart sounds and intact distal pulses.    No murmur heard.  Pulmonary/Chest: Effort normal and breath sounds normal. No respiratory distress. She has no wheezes. She has no rales. She exhibits no tenderness.   Abdominal: Soft. Bowel sounds are normal. She exhibits no distension. There is tenderness in the right lower quadrant. There is no rigidity, no rebound, no guarding and no CVA tenderness.   Abdominal dressing C/D/I  JOSE drains x 2    Genitourinary:   Genitourinary Comments: Deferred   Musculoskeletal: Normal range of motion.   Neurological: She is alert, oriented to person, place, and time and easily aroused. No sensory deficit.   Skin: Skin is warm and dry. Capillary refill takes less than 2 seconds.   Psychiatric: She has a normal mood and affect. Her behavior is normal. Judgment and thought content normal.   Nursing note and vitals reviewed.      Significant Labs:   Blood Culture:   Recent Labs  Lab 07/07/18  0945 07/07/18  0950 07/10/18  1625   LABBLOO Gram stain noemi bottle: Gram negative rods   Positive results previously called 07/08/2018  21:00  BACTEROIDES VULGATUS Gram stain noemi bottle: Gram negative rods   Results called to and read back by: Leroy Yang LPN  07/08/2018  19:24  BACTEROIDES VULGATUS No Growth to date  No Growth to date  No Growth to date  No Growth to date  No Growth to date  No Growth to date  No Growth to date  No Growth to date  No Growth to date  No Growth to date     BMP:   Recent Labs  Lab 07/14/18  0621 07/14/18  1517   GLU 85 95    139   K 2.9* 3.2*    109   CO2 23 21*   BUN 2* 2*   CREATININE 0.8 0.8   CALCIUM 9.1 9.2   MG 1.8  --      CBC:   Recent Labs  Lab 07/14/18  0621   WBC 10.54    HGB 7.8*   HCT 23.4*        Wound Culture:   Recent Labs  Lab 07/11/18  1640   LABAERO ENTEROCOCCUS  AVIUMFewIdentification and susceptibility pending  AGUEDA GLABRATAMany     All pertinent labs within the past 24 hours have been reviewed.    Significant Imaging: I have reviewed all pertinent imaging results/findings within the past 24 hours.

## 2018-07-15 NOTE — ASSESSMENT & PLAN NOTE
- CT chest/abdomen/pelvis with contrast showed multilobar infiltrates with small right effusion concerning for pneumonia  - CXR showed no infiltrates and CT abdomen/pelvis with contrast negative for infiltrates at lung bases -- both obtained on admit  - No WBC count. Hx of HIV, will obtain CD 4 count -- 339. Will continue IV Vancomycin and Zosyn  - Repeat blood cultures with NGTD   - Supplemental oxygen prn, keep O2 sats > 92%  - Duonebs prn    07/14/18  - CT chest/abdomen/pelvis showed worsening multilobar pneumonia. Pt currently on IV Vancomycin and Zosyn. Will obtain modified barium swallow for possible aspiration and will consult SLP for evaluation if unable to get swallow study over the weekend    07/15/18   - SLP to evaluate and treat  - Modified barium swallow test pending for suspected aspiration  - Continue IV Zosyn. Blood cultures with NGTD

## 2018-07-16 LAB
ALBUMIN SERPL BCP-MCNC: 2.2 G/DL
ALP SERPL-CCNC: 121 U/L
ALT SERPL W/O P-5'-P-CCNC: 7 U/L
ANION GAP SERPL CALC-SCNC: 6 MMOL/L
ANION GAP SERPL CALC-SCNC: 9 MMOL/L
AST SERPL-CCNC: 18 U/L
BACTERIA BLD CULT: NORMAL
BACTERIA BLD CULT: NORMAL
BACTERIA SPEC AEROBE CULT: NORMAL
BACTERIA SPEC ANAEROBE CULT: NORMAL
BACTERIA SPEC ANAEROBE CULT: NORMAL
BASOPHILS # BLD AUTO: 0.01 K/UL
BASOPHILS NFR BLD: 0.1 %
BILIRUB SERPL-MCNC: 1 MG/DL
BUN SERPL-MCNC: 2 MG/DL
BUN SERPL-MCNC: 2 MG/DL
CALCIUM SERPL-MCNC: 8.8 MG/DL
CALCIUM SERPL-MCNC: 8.9 MG/DL
CHLORIDE SERPL-SCNC: 108 MMOL/L
CHLORIDE SERPL-SCNC: 109 MMOL/L
CO2 SERPL-SCNC: 22 MMOL/L
CO2 SERPL-SCNC: 22 MMOL/L
CREAT SERPL-MCNC: 0.8 MG/DL
CREAT SERPL-MCNC: 0.8 MG/DL
DIFFERENTIAL METHOD: ABNORMAL
EOSINOPHIL # BLD AUTO: 0.1 K/UL
EOSINOPHIL NFR BLD: 1 %
ERYTHROCYTE [DISTWIDTH] IN BLOOD BY AUTOMATED COUNT: 13.6 %
EST. GFR  (AFRICAN AMERICAN): >60 ML/MIN/1.73 M^2
EST. GFR  (AFRICAN AMERICAN): >60 ML/MIN/1.73 M^2
EST. GFR  (NON AFRICAN AMERICAN): >60 ML/MIN/1.73 M^2
EST. GFR  (NON AFRICAN AMERICAN): >60 ML/MIN/1.73 M^2
GLUCOSE SERPL-MCNC: 74 MG/DL
GLUCOSE SERPL-MCNC: 80 MG/DL
HCT VFR BLD AUTO: 21.6 %
HGB BLD-MCNC: 7.1 G/DL
LYMPHOCYTES # BLD AUTO: 3.2 K/UL
LYMPHOCYTES NFR BLD: 26.1 %
MAGNESIUM SERPL-MCNC: 1.9 MG/DL
MCH RBC QN AUTO: 30.6 PG
MCHC RBC AUTO-ENTMCNC: 32.9 G/DL
MCV RBC AUTO: 93 FL
MONOCYTES # BLD AUTO: 1.3 K/UL
MONOCYTES NFR BLD: 10.7 %
NEUTROPHILS # BLD AUTO: 7.5 K/UL
NEUTROPHILS NFR BLD: 63.1 %
PLATELET # BLD AUTO: 395 K/UL
PMV BLD AUTO: 9.5 FL
POTASSIUM SERPL-SCNC: 2.9 MMOL/L
POTASSIUM SERPL-SCNC: 3.4 MMOL/L
PROT SERPL-MCNC: 6 G/DL
RBC # BLD AUTO: 2.32 M/UL
SODIUM SERPL-SCNC: 137 MMOL/L
SODIUM SERPL-SCNC: 139 MMOL/L
VANCOMYCIN TROUGH SERPL-MCNC: 4.4 UG/ML
WBC # BLD AUTO: 12.1 K/UL

## 2018-07-16 PROCEDURE — 25000003 PHARM REV CODE 250: Performed by: NURSE PRACTITIONER

## 2018-07-16 PROCEDURE — 96372 THER/PROPH/DIAG INJ SC/IM: CPT

## 2018-07-16 PROCEDURE — 83735 ASSAY OF MAGNESIUM: CPT

## 2018-07-16 PROCEDURE — 94761 N-INVAS EAR/PLS OXIMETRY MLT: CPT

## 2018-07-16 PROCEDURE — 0W9G3ZZ DRAINAGE OF PERITONEAL CAVITY, PERCUTANEOUS APPROACH: ICD-10-PCS

## 2018-07-16 PROCEDURE — 85025 COMPLETE CBC W/AUTO DIFF WBC: CPT

## 2018-07-16 PROCEDURE — 36415 COLL VENOUS BLD VENIPUNCTURE: CPT

## 2018-07-16 PROCEDURE — 80053 COMPREHEN METABOLIC PANEL: CPT

## 2018-07-16 PROCEDURE — 63600175 PHARM REV CODE 636 W HCPCS: Performed by: INTERNAL MEDICINE

## 2018-07-16 PROCEDURE — 11000001 HC ACUTE MED/SURG PRIVATE ROOM

## 2018-07-16 PROCEDURE — 63600175 PHARM REV CODE 636 W HCPCS: Performed by: RADIOLOGY

## 2018-07-16 PROCEDURE — 25000003 PHARM REV CODE 250: Performed by: INTERNAL MEDICINE

## 2018-07-16 PROCEDURE — 63600175 PHARM REV CODE 636 W HCPCS: Performed by: NURSE PRACTITIONER

## 2018-07-16 PROCEDURE — 80048 BASIC METABOLIC PNL TOTAL CA: CPT

## 2018-07-16 PROCEDURE — 80202 ASSAY OF VANCOMYCIN: CPT

## 2018-07-16 RX ORDER — FENTANYL CITRATE 50 UG/ML
INJECTION, SOLUTION INTRAMUSCULAR; INTRAVENOUS CODE/TRAUMA/SEDATION MEDICATION
Status: COMPLETED | OUTPATIENT
Start: 2018-07-16 | End: 2018-07-16

## 2018-07-16 RX ORDER — POTASSIUM CHLORIDE 20 MEQ/1
60 TABLET, EXTENDED RELEASE ORAL ONCE
Status: COMPLETED | OUTPATIENT
Start: 2018-07-16 | End: 2018-07-16

## 2018-07-16 RX ADMIN — CYPROHEPTADINE HYDROCHLORIDE 4 MG: 4 TABLET ORAL at 02:07

## 2018-07-16 RX ADMIN — PIPERACILLIN SODIUM AND TAZOBACTAM SODIUM 4.5 G: 4; .5 INJECTION, POWDER, LYOPHILIZED, FOR SOLUTION INTRAVENOUS at 11:07

## 2018-07-16 RX ADMIN — HEPARIN SODIUM 5000 UNITS: 5000 INJECTION, SOLUTION INTRAVENOUS; SUBCUTANEOUS at 12:07

## 2018-07-16 RX ADMIN — ROSUVASTATIN CALCIUM 20 MG: 10 TABLET, FILM COATED ORAL at 10:07

## 2018-07-16 RX ADMIN — MICAFUNGIN SODIUM 100 MG: 20 INJECTION, POWDER, LYOPHILIZED, FOR SOLUTION INTRAVENOUS at 07:07

## 2018-07-16 RX ADMIN — BUPROPION HYDROCHLORIDE 150 MG: 150 TABLET, FILM COATED, EXTENDED RELEASE ORAL at 09:07

## 2018-07-16 RX ADMIN — STANDARDIZED SENNA CONCENTRATE AND DOCUSATE SODIUM 2 TABLET: 8.6; 5 TABLET, FILM COATED ORAL at 09:07

## 2018-07-16 RX ADMIN — ENALAPRIL MALEATE 20 MG: 10 TABLET ORAL at 10:07

## 2018-07-16 RX ADMIN — CYPROHEPTADINE HYDROCHLORIDE 4 MG: 4 TABLET ORAL at 10:07

## 2018-07-16 RX ADMIN — BUPROPION HYDROCHLORIDE 150 MG: 150 TABLET, FILM COATED, EXTENDED RELEASE ORAL at 10:07

## 2018-07-16 RX ADMIN — POTASSIUM CHLORIDE 60 MEQ: 1500 TABLET, EXTENDED RELEASE ORAL at 10:07

## 2018-07-16 RX ADMIN — LINEZOLID 600 MG: 600 INJECTION, SOLUTION INTRAVENOUS at 10:07

## 2018-07-16 RX ADMIN — LINEZOLID 600 MG: 600 INJECTION, SOLUTION INTRAVENOUS at 09:07

## 2018-07-16 RX ADMIN — PIPERACILLIN SODIUM AND TAZOBACTAM SODIUM 4.5 G: 4; .5 INJECTION, POWDER, LYOPHILIZED, FOR SOLUTION INTRAVENOUS at 03:07

## 2018-07-16 RX ADMIN — PIPERACILLIN SODIUM AND TAZOBACTAM SODIUM 4.5 G: 4; .5 INJECTION, POWDER, LYOPHILIZED, FOR SOLUTION INTRAVENOUS at 06:07

## 2018-07-16 RX ADMIN — FENTANYL CITRATE 50 MCG: 50 INJECTION, SOLUTION INTRAMUSCULAR; INTRAVENOUS at 10:07

## 2018-07-16 RX ADMIN — CYPROHEPTADINE HYDROCHLORIDE 4 MG: 4 TABLET ORAL at 09:07

## 2018-07-16 RX ADMIN — FERROUS GLUCONATE TAB 324 MG (37.5 MG ELEMENTAL IRON) 324 MG: 324 (37.5 FE) TAB at 10:07

## 2018-07-16 RX ADMIN — HEPARIN SODIUM 5000 UNITS: 5000 INJECTION, SOLUTION INTRAVENOUS; SUBCUTANEOUS at 05:07

## 2018-07-16 NOTE — PT/OT/SLP PROGRESS
Physical Therapy      Patient Name:  Lizz Noguera   MRN:  [id number]    1040 Pt refused tx. PT REPORTED JUST RETURNING FROM PROCEDURE.     Angela Moon, PT,7/16/2018

## 2018-07-16 NOTE — ASSESSMENT & PLAN NOTE
- K+ 2.9. Repleted with KCL 60 mEq PO x 1. Repeat BMP K+ level 3.4  - Magnesium level unremarkable  - Will continue to monitor and replete as needed  - Daily BMP

## 2018-07-16 NOTE — ASSESSMENT & PLAN NOTE
- CT chest/abdomen/pelvis with contrast showed multilobar infiltrates with small right effusion concerning for pneumonia  - CXR showed no infiltrates and CT abdomen/pelvis with contrast negative for infiltrates at lung bases -- both obtained on admit  - No WBC count. Hx of HIV, will obtain CD 4 count -- 339. Will continue IV Vancomycin and Zosyn  - Repeat blood cultures with NGTD   - Supplemental oxygen prn, keep O2 sats > 92%  - Duonebs prn    07/14/18  - CT chest/abdomen/pelvis showed worsening multilobar pneumonia. Pt currently on IV Vancomycin and Zosyn. Will obtain modified barium swallow for possible aspiration and will consult SLP for evaluation if unable to get swallow study over the weekend    07/15/18   - SLP to evaluate and treat  - Modified barium swallow test pending for suspected aspiration  - Continue IV Zosyn. Blood cultures with NGTD    07/16/18  - MBSS ordered for possible aspiration due to worsening mulilobar pnuemonia seen on imaging on 07/13/18. Concerns regarding barium with pt diagnosis of possible perforated diverticulum and abscess.  - SLP evaluated -- no coughing or delayed swallowing noted. Likely not aspirating.  MBSS held at this time  - Course of PO Avelox upon discharge

## 2018-07-16 NOTE — ASSESSMENT & PLAN NOTE
- Initial CT scan upon admit showed diverticulitis without abscess. Pt was treated for diverticulitis initially with IV Cipro and Flagyl  - Blood cultures with gram negative bacteremia, BACTEROIDES VULGATUS. Normally susceptible to Flagyl and Zosyn  - Repeat blood cultures with NGTD  - Due to fever (101.4) and abdominal distension yesterday, 07/10/18, obtained CT chest/abdomen/pelvis with contrast which showed anterior pelvic abscess measuring 8.5 cm with several foci of free air adjacent to the sigmoid colon in the mesentery and liver likely from perforated diverticulum  - General surgery consulted and recommended ct guided abscess drainage -- performed by IR -- JOSE drains x 2  - Aerobic culture from anterior pelvis -- candida glabrata  - Aerobic culture from posterior pelvis -- enterococcus avivia, susceptibilities pending  - Continue IV Vancomycin and Zosyn    07/14/18  - Repeat CT chest/abdomen/pelvis with contrast showed mild enlargement of a small left lower anterior abdominal fluid collection from 2.1 to 2.3 cm which may represent a developing abscesses  - Discussed with Dr. Colindres (general surgery) -- due to continued fever, which could be also due to pneumonia, consult IR to see if they may be able to aspirate from that area    07/15/18  - Wound cultures (+) enterococcus avium, susceptibilities pending and candida glabrata  - Due to (+) wound cultures, Vancomycin d/cd. IV Zyvox and Micafungin added by ID. Zosyn continued  - IR consulted for aspiration of small collection of fluid to anterior abdomen. NPO after midnight    07/16/18  - Wound cultures (+) enterococcus avium and candida glabrata, susceptibilities reviewed. ID recommends upon discharge IV Unasyn 3 gm IV q6hrs and Micafungin 100 mg IV daily, both x 2 weeks. Pt will also need repeat CT post antibiotics  - Will consult PICC team and consult social work for home IV services  - IR re consulted for small collection of fluid to anterior abdomen -- less  than 5 ml of reddish cloudy fluid removed today

## 2018-07-16 NOTE — ASSESSMENT & PLAN NOTE
- Sees Dr. Doe at [facility] outpatient  - CD 4 339  - Continue home medications

## 2018-07-16 NOTE — ASSESSMENT & PLAN NOTE
- Continues to have high grade fevers. Max temp of 102.6 overnight. Currently being treated for pneumonia and diverticulitis with abscess. S/p ct guided drainage of abscess with placement of JOSE drain x 2  - ID consulted -- recommended repeat CT chest/abdomen/pelvis due to continued fever  - Blood cultures with NGTD  - Wound culture with no growth    07/14/18  - Max temp last night of 102.3. Multifactorial -- mulitlobar pneumonia and pelvic abscess. Both are currently being treated. Re consulted IR, repeat CT chest/abdomen/pelvis on 07/13/18 showed mild enlargement of a small left lower anterior abdominal fluid collection from 2.1 to 2.3 cm which may represent a developing abscesses. May be able to aspirate  - Modified barium swallow for possible aspiration pneumonia. If unable to get swallow study over the weekend, will have SLP evaluate  - Blood cultures with NGTD  - Aerobic culture from anterior pelvis -- candida glabrata  - Aerobic culture from posterior pelvis -- enterococcus avivia, susceptibilities pending  - ID following  - Continue IV Vancomycin and Zosyn    07/15/18  - Due to (+) wound cultures, Vancomycin d/cd. IV Zyvox and Micafungin added. Zosyn continued  - Wound cultures (+) enterococcus avium, susceptibilities pending and candida glabrata  - IR consulted for aspiration of small collection of fluid to anterior abdomen    07/16/18  - (+) wound cultures -- enterococcus avium and candida glabrata, susceptibilities reviewed  - Fever improving s/p change of antibiotics. Max temp overnight 100.1. Will continue to monitor  - Continue IV Zosyn, Zyvox, and Micafungin per ID recommendations

## 2018-07-16 NOTE — OP NOTE
Sterile technique was performed in the anterior abdomen, lidocaine was used as a local anesthetic.  Less than 5 ml of reddish cloudy fluid removed.  Pt tolerated the procedure well without immediate complications.  Please see radiologist report for details. F/u with PCP and/or ordering physician.

## 2018-07-16 NOTE — PROGRESS NOTES
[facility] Hospital Medicine  Progress Note    Patient Name: Lizz Noguera  MRN: [id number]  Patient Class: IP- Inpatient   Admission Date: 7/7/2018  Length of Stay: 9 days  Attending Physician: David Barnes MD  Primary Care Provider: Joseluis oDe MD (Inactive)        Subjective:     Principal Problem:Diverticulitis of large intestine with abscess without bleeding    HPI:  Lizz Noguera is a 57 y.o. female patient with a PMHx of HIV, CVA with right sided residual weakness, HLP, depression, who presented to the ER for suprapubic abd pain which onset suddenly this AM. Associated sxs included constipation. Patient denies any n/v/d, hematochezia, dysuria, hematuria, difficulty urinating, frequency, fever, chills, and all other sxs at this time. Pt denies any history of abdominal surgeries. She still has her appendix. CT scan showed uncomplicated diverticulitis of the sigmoid without abscess. Blood cultures pending. In ER, Temp 103.7, glucose 148, lactic acid 2.5. She is admitted with diverticulitis on IV flagyl and IV cipro.     Hospital Course:  Ms Noguera is a 57 year old female with PMHx HIV, CVA, HLP who presented to complaints of lower abdominal pain. Associated symptoms include constipation. CT scan of the abdomen showed uncomplicated diverticulitis of the sigmoid without abscess. She is currently on Cipro IV and Flagyl IV. She received Senna-docusate and Miralax on yesterday with no BM results. She continues to complain of constipation. Will give mag citrate X 1 and monitor.    7/9/2018- Patient seen and examined today. She is not having abdomen pain and is not constipation. Patient not able to eat regular diet this afternoon. Vital signs stable. Continuing Cipro IV and Flagyl IV.     07/10/18 -- Fever overnight of 101.4. Blood cultures (+) gram negative rods, BACTEROIDES VULGATUS. Susceptibilities pending. Pt currently receiving IV Zosyn and Cipro. Diet advanced, tolerated without  "abdominal pain, N/V. Pt reports, "my stomach is bigger than normal, I can barely see my belly button." Mild distension noted. Will obtain abdominal US to assess further.    07/11/18 -- Due to fever and abdominal distension yesterday obtained repeat CT. CT abdomen/pelvis with contrast showed anterior pelvic abscess measuring 8.5 cm with several foci of free air adjacent to the sigmoid colon in the mesentery and liver likely from perforated diverticulum. General surgery consulted and recommended ct guided abscess drainage to be performed by IR. Antibiotics changed to IV Zosyn only. Repeat blood cultures with NGTD.     07/12/18 -- S/p ct guided drainage of abscess, cultures sent which are currently pending. Has 2 JOSE drains placed by IR. Max temp of 101.2 overnight. Repeat blood cultures NGTD. Pt has been started on clear liquid diet to due repeat abdominal xray results this afternoon -- prominent loops of small bowel are seen throughout the abdomen. May be related to bowel dysfunction secondary to diverticular abscess. General surgery following.     07/13/18 -- Max temp of 102.6 overnight. Discussed case with ID who recommended repeat CT chest/abdomen/pelvis due to continued high grade fevers. Abdominal pain is improving, reporting bowel movements are becoming more formed. Per General surgery, may still require sigmoid resection for diverticulitis however there is also the possibility that she could develop a small-bowel obstruction and this will be monitored    07/14/18 -- Max temp of 102.3 last night. Repeat CT chest/abdomen/pelvis with contrast showed worsening multilobar pneumonia and mild enlargement of a small left lower anterior abdominal fluid collection from 2.1 to 2.3 cm which may represent a developing abscesses. General surgery following. Discussed case with Dr. Colindres -- continues to have fever, could be from pneumonia and/or abscess. Recommended consulting IR and see if the abscess could be aspirated. No " need for surgical procedure at this time. Will get modified barium swallow study and SLP evaluation for possible aspiration.     07/15/18 -- Max temp of 101.3 overnight. Antibiotics changed last night due to wound cultures (+) enterococcus avium (susceptibilities pending) and candida glabrata. Vancomycin d/c. IV Zyvox and Micafungin added by ID. IV Zosyn continued. NPO after midnight -- small increase in size of anterior fluid collection.  IR consulted to drain tomorrow morning    07/16/18 -- Max temp of 100.1 overnight. High grade temps overnight appear to be improving after changes in antibiotics. Hgb 7.1 on AM labs, asymptomatic. Will continue to monitor and transfuse as needed. MBSS ordered for possible aspiration due to worsening mulilobar pnuemonia seen on imaging on 07/13/18. Concerns regarding barium with pt diagnosis of possible perforated diverticulum and abscess.SLP evaluated -- no coughing or delayed swallowing noted. Likely not aspirating.  MBSS held. IR consulted for small increase in size of anterior fluid collection -- less than 5 ml of reddish cloudy fluid removed. Wound cultures grew candida glabrata and enterococcus avium. Susceptibilities reviewed. Discussed case with ID  -- recommended Unasyn 3 gm IV every 6 hours and Micafungin 100 mg IV daily, both for 2 weeks then repeat CT post antibiotics. Pt will need PICC placed. Will place consult to social work for home IV services.       Review of Systems   Constitutional: Positive for activity change and fever.   HENT: Negative.    Eyes: Negative.    Respiratory: Negative for apnea, cough, choking, chest tightness, shortness of breath, wheezing and stridor.    Cardiovascular: Negative for chest pain, palpitations and leg swelling.   Gastrointestinal: Positive for abdominal distention, abdominal pain and diarrhea. Negative for constipation, nausea and vomiting.   Endocrine: Negative.    Genitourinary: Negative.    Musculoskeletal: Negative.    Skin:  Negative.    Allergic/Immunologic: Negative.    Neurological: Negative for dizziness, tremors, seizures, syncope, facial asymmetry, speech difficulty, weakness, light-headedness, numbness and headaches.   Hematological: Negative.    Psychiatric/Behavioral: Negative.      Objective:     Vital Signs (Most Recent):  Temp: 99.1 °F (37.3 °C) (07/16/18 1622)  Pulse: 89 (07/16/18 1622)  Resp: 17 (07/16/18 1622)  BP: (!) 144/65 (07/16/18 1622)  SpO2: 96 % (07/16/18 1622) Vital Signs (24h Range):  Temp:  [98.8 °F (37.1 °C)-100.1 °F (37.8 °C)] 99.1 °F (37.3 °C)  Pulse:  [79-91] 89  Resp:  [15-26] 17  SpO2:  [94 %-98 %] 96 %  BP: (126-151)/(49-69) 144/65     Weight: 63.1 kg (139 lb 1.8 oz)  Body mass index is 23.15 kg/m².    Intake/Output Summary (Last 24 hours) at 07/16/18 1715  Last data filed at 07/16/18 1301   Gross per 24 hour   Intake          4331.67 ml   Output               35 ml   Net          4296.67 ml      Physical Exam   Constitutional: She is oriented to person, place, and time. She appears well-developed. She is cooperative. She is easily aroused. No distress.   HENT:   Head: Normocephalic and atraumatic.   Eyes: EOM are normal.   Neck: Normal range of motion. Neck supple.   Cardiovascular: Normal rate, regular rhythm, normal heart sounds and intact distal pulses.    No murmur heard.  Pulmonary/Chest: Effort normal and breath sounds normal. No respiratory distress. She has no wheezes. She has no rales. She exhibits no tenderness.   Abdominal: Soft. Bowel sounds are normal. She exhibits no distension. There is no tenderness. There is no rigidity, no rebound, no guarding and no CVA tenderness.   Abdominal dressing C/D/I  JOSE drains x 2 -- scant serous fluid noted   Genitourinary:   Genitourinary Comments: Deferred   Musculoskeletal: Normal range of motion.   Neurological: She is alert, oriented to person, place, and time and easily aroused. No sensory deficit.   Skin: Skin is warm and dry. Capillary refill takes  less than 2 seconds.   Psychiatric: She has a normal mood and affect. Her behavior is normal. Judgment and thought content normal.   Nursing note and vitals reviewed.      Significant Labs:   CBC:   Recent Labs  Lab 07/15/18  0502 07/16/18 0418   WBC 12.35 12.10   HGB 7.6* 7.1*   HCT 22.7* 21.6*    395*     CMP:   Recent Labs  Lab 07/15/18  0502 07/16/18 0418 07/16/18  1244    139 137   K 3.0* 2.9* 3.4*    108 109   CO2 23 22* 22*   GLU 84 74 80   BUN 2* 2* 2*   CREATININE 0.8 0.8 0.8   CALCIUM 9.2 8.9 8.8   PROT 6.0 6.0  --    ALBUMIN 2.2* 2.2*  --    BILITOT 1.1* 1.0  --    ALKPHOS 119 121  --    AST 25 18  --    ALT 9* 7*  --    ANIONGAP 7* 9 6*   EGFRNONAA >60 >60 >60     Magnesium:   Recent Labs  Lab 07/15/18  0502 07/16/18 0418   MG 1.8 1.9       Significant Imaging:   Imaging Results          CT Abdomen Pelvis With Contrast (Final result)  Result time 07/07/18 11:30:11    Final result by Arpan Del Castillo MD (07/07/18 11:30:11)                 Impression:      1.  Significant thickening of the mid sigmoid colon associated with diverticula, with surrounding edema.  Associated ascites in the cul-de-sac and around the liver.  Findings are concerning for uncomplicated diverticulitis of the sigmoid colon.  Negative for free air or focal drainable fluid collection.    2. Negative for acute process involving the abdomen or pelvis otherwise.  Normal appendix.  Negative for renal stone disease or hydronephrosis.    3.  Moderate to severe multifactorial spinal canal stenosis at L4/L5 and L5/S1.  Clinical correlation is advised.    4.  Avascular necrosis changes to the left greater than right hip joints.  Clinical correlation is advised.    5.  Cholelithiasis.  Small fat filled umbilical hernia.  Other nonemergent findings as described above.    All CT scans at this facility used dose modulation, iterative reconstruction, and/or weight based dosing when appropriate to reduce radiation dose to as low  as reasonably achievable.      Electronically signed by: Arpan Del Castillo MD  Date:    07/07/2018  Time:    11:30             Narrative:    EXAMINATION:  CT ABDOMEN PELVIS WITH CONTRAST    CLINICAL HISTORY:  Lower abdominal pain;    TECHNIQUE:  Axial images through the abdomen and pelvis were obtained with the use of IV contrast.  Sagittal and coronal reconstructions are provided for review.  Oral contrast was not utilized.    COMPARISON:  None    FINDINGS:  No comparison studies are available.    LUNG BASES:   Lung bases are clear.  Negative for pleural or pericardial effusions. The distal esophagus is normal.    ABDOMEN: Gallstones.  The liver, spleen and gallbladder otherwise appear normal.  The pancreas is unremarkable.  Kidneys and adrenal glands are normal.    Negative for adenopathy noted within the abdomen or pelvis.  Vascular calcifications are present without aneurysmal changes. Portal vein is patent.    The stomach and small bowel appear normal.  There is significant edema within sigmoid colon with associated diverticula.  With moderate surrounding edema.  Mild to moderate ascites in the cul-de-sac and mild ascites around the liver.  The rest of the colon appears normal.  A normal appendix.    PELVIS: The urinary bladder is unremarkable.    The female pelvic organs are normal. There are pelvic phleboliths.    No significant osseous abnormality is identified.  Broad-based disc bulge at L4/L5 and L5/S1 with moderate to severe multifactorial spinal canal stenosis.  There are significant degenerative changes a changes of the femoral heads.  Osteopenia.    Negative for groin adenopathy.    Tiny fat filled umbilical hernia.  The abdominal wall is otherwise intact.                               X-Ray Chest AP Portable (Final result)  Result time 07/07/18 10:18:44    Final result by Arapn Del Castillo MD (07/07/18 10:18:44)                 Impression:      1.  Negative for acute process involving the chest, considering  there are lower lung volumes on today's study.    2.  Stable findings as noted above.      Electronically signed by: Arpan Del Castillo MD  Date:    07/07/2018  Time:    10:18             Narrative:    EXAMINATION:  XR CHEST AP PORTABLE    CLINICAL HISTORY:  Sepsis;    COMPARISON:  2 June 2014    FINDINGS:  EKG leads overlie the chest.  Mildly low lung volumes.  The lungs are otherwise clear.  The cardiac silhouette size is normal. The trachea is midline and the mediastinal width is normal. Negative for focal infiltrate, effusion or pneumothorax. Pulmonary vasculature is normal. Negative for osseous abnormalities. Tortuous aorta.  Convex left curvature of the lower thoracic spine.  Stable calcifications within the central portions of both humeri, possibly bone infarctions.                              Assessment/Plan:      * Diverticulitis of large intestine with abscess without bleeding    - Initial CT scan upon admit showed diverticulitis without abscess. Pt was treated for diverticulitis initially with IV Cipro and Flagyl  - Blood cultures with gram negative bacteremia, BACTEROIDES VULGATUS. Normally susceptible to Flagyl and Zosyn  - Repeat blood cultures with NGTD  - Due to fever (101.4) and abdominal distension yesterday, 07/10/18, obtained CT chest/abdomen/pelvis with contrast which showed anterior pelvic abscess measuring 8.5 cm with several foci of free air adjacent to the sigmoid colon in the mesentery and liver likely from perforated diverticulum  - General surgery consulted and recommended ct guided abscess drainage -- performed by IR -- JOSE drains x 2  - Aerobic culture from anterior pelvis -- candida glabrata  - Aerobic culture from posterior pelvis -- enterococcus avivia, susceptibilities pending  - Continue IV Vancomycin and Zosyn    07/14/18  - Repeat CT chest/abdomen/pelvis with contrast showed mild enlargement of a small left lower anterior abdominal fluid collection from 2.1 to 2.3 cm which may  represent a developing abscesses  - Discussed with Dr. Colindres (general surgery) -- due to continued fever, which could be also due to pneumonia, consult IR to see if they may be able to aspirate from that area    07/15/18  - Wound cultures (+) enterococcus avium, susceptibilities pending and candida glabrata  - Due to (+) wound cultures, Vancomycin d/cd. IV Zyvox and Micafungin added by ID. Zosyn continued  - IR consulted for aspiration of small collection of fluid to anterior abdomen. NPO after midnight    07/16/18  - Wound cultures (+) enterococcus avium and candida glabrata, susceptibilities reviewed. ID recommends upon discharge IV Unasyn 3 gm IV q6hrs and Micafungin 100 mg IV daily, both x 2 weeks. Pt will also need repeat CT post antibiotics  - Will consult PICC team and consult social work for home IV services  - IR re consulted for small collection of fluid to anterior abdomen -- less than 5 ml of reddish cloudy fluid removed today        Gram-negative bacteremia    - Blood cultures with gram negative bacteremia, BACTEROIDES VULGATUS. Normally susceptible to Zosyn or Flagyl  - Cipro/Flagyl discontinued today due to CT findings. Pt had a total of 4 days of Flagyl before discontinuation   - Repeat blood cultures with NGTD -- appears to have resolved  - ID consulted for recommendations of antibiotics due to bacteremia upon discharge  - Currently on IV Zosyn          Hypertension    - BP stable  - Continue Vasotec  - Will continue to monitor and adjust medications as needed          Fever    - Continues to have high grade fevers. Max temp of 102.6 overnight. Currently being treated for pneumonia and diverticulitis with abscess. S/p ct guided drainage of abscess with placement of JOSE drain x 2  - ID consulted -- recommended repeat CT chest/abdomen/pelvis due to continued fever  - Blood cultures with NGTD  - Wound culture with no growth    07/14/18  - Max temp last night of 102.3. Multifactorial -- mulitlobar  pneumonia and pelvic abscess. Both are currently being treated. Re consulted IR, repeat CT chest/abdomen/pelvis on 07/13/18 showed mild enlargement of a small left lower anterior abdominal fluid collection from 2.1 to 2.3 cm which may represent a developing abscesses. May be able to aspirate  - Modified barium swallow for possible aspiration pneumonia. If unable to get swallow study over the weekend, will have SLP evaluate  - Blood cultures with NGTD  - Aerobic culture from anterior pelvis -- candida glabrata  - Aerobic culture from posterior pelvis -- enterococcus avivia, susceptibilities pending  - ID following  - Continue IV Vancomycin and Zosyn    07/15/18  - Due to (+) wound cultures, Vancomycin d/cd. IV Zyvox and Micafungin added. Zosyn continued  - Wound cultures (+) enterococcus avium, susceptibilities pending and candida glabrata  - IR consulted for aspiration of small collection of fluid to anterior abdomen    07/16/18  - (+) wound cultures -- enterococcus avium and candida glabrata, susceptibilities reviewed  - Fever improving s/p change of antibiotics. Max temp overnight 100.1. Will continue to monitor  - Continue IV Zosyn, Zyvox, and Micafungin per ID recommendations        Pneumonia due to infectious organism    - CT chest/abdomen/pelvis with contrast showed multilobar infiltrates with small right effusion concerning for pneumonia  - CXR showed no infiltrates and CT abdomen/pelvis with contrast negative for infiltrates at lung bases -- both obtained on admit  - No WBC count. Hx of HIV, will obtain CD 4 count -- 339. Will continue IV Vancomycin and Zosyn  - Repeat blood cultures with NGTD   - Supplemental oxygen prn, keep O2 sats > 92%  - Duonebs prn    07/14/18  - CT chest/abdomen/pelvis showed worsening multilobar pneumonia. Pt currently on IV Vancomycin and Zosyn. Will obtain modified barium swallow for possible aspiration and will consult SLP for evaluation if unable to get swallow study over the  weekend    07/15/18   - SLP to evaluate and treat  - Modified barium swallow test pending for suspected aspiration  - Continue IV Zosyn. Blood cultures with NGTD    07/16/18  - MBSS ordered for possible aspiration due to worsening mulilobar pnuemonia seen on imaging on 07/13/18. Concerns regarding barium with pt diagnosis of possible perforated diverticulum and abscess.  - SLP evaluated -- no coughing or delayed swallowing noted. Likely not aspirating.  MBSS held at this time  - Course of PO Avelox upon discharge          Hypokalemia    - K+ 2.9. Repleted with KCL 60 mEq PO x 1. Repeat BMP K+ level 3.4  - Magnesium level unremarkable  - Will continue to monitor and replete as needed  - Daily BMP            HIV (human immunodeficiency virus infection)    - Sees Dr. Doe at [facility] outpatient  - CD 4 339  - Continue home medications          Tobacco abuse    - Smoking cessation counseling  - Nicotine patch              VTE Risk Mitigation         Ordered     heparin (porcine) injection 5,000 Units  Every 8 hours      07/13/18 1741     IP VTE LOW RISK PATIENT  Once      07/07/18 1357     Place sequential compression device  Until discontinued      07/07/18 1357              ALIS Barton  Department of Hospital Medicine   [facility]

## 2018-07-16 NOTE — PLAN OF CARE
Problem: Patient Care Overview  Goal: Plan of Care Review  Outcome: Ongoing (interventions implemented as appropriate)  Patient is on room air and tolerating well. No distress noted.

## 2018-07-16 NOTE — SEDATION DOCUMENTATION
Procedure complete. Less than 5ml serosanguineous drainage removed from peritoneal space. Pt tolerated well, vss. Band-Aid placed to healthy site. Report called to OSBALDO Cárdenas. Pt transferred self to [facility] and tranported back to room 551.

## 2018-07-16 NOTE — CONSULTS
Inpatient consult to Interventional Radiology  Consult performed by: SHAYY TRINIDAD  Consult ordered by: ASHLEY DAI  Reason for consult: Abdominal abscess  Assessment/Recommendations: Chart reviewed and procedure has been ordered.  Will perform asap.  Thank you for the consult.

## 2018-07-16 NOTE — PLAN OF CARE
07/16/18 1544   Medicare Message   Important Message from Medicare regarding Discharge Appeal Rights Given to patient/caregiver;Explained to patient/caregiver;Signed/date by patient/caregiver   Date IMM was signed 07/16/18   Time IMM was signed [id number]

## 2018-07-16 NOTE — SEDATION DOCUMENTATION
Pt lying on ct table with bilateral arms above head. Pt vss; cardiac monitoring in place. Pt verbalized understanding and all questions answered

## 2018-07-16 NOTE — PLAN OF CARE
Problem: Patient Care Overview  Goal: Plan of Care Review  Outcome: Ongoing (interventions implemented as appropriate)  Fall precautions maintained, pt free from injuries/fall.  Repositions and ambulates w/ stand by assist.  Denies pain at this time.  IR aspiration done today, pt tolerated well.  IVF and abx as prescribed.  Bed alarm on.  POC and meds discussed with pt, pt verbalized understanding.  Side rails x 2, bed locked and low, phone and call light w/in reach.  Chart check done. Will cont to monitor.

## 2018-07-16 NOTE — SUBJECTIVE & OBJECTIVE
Review of Systems   Constitutional: Positive for activity change and fever.   HENT: Negative.    Eyes: Negative.    Respiratory: Negative for apnea, cough, choking, chest tightness, shortness of breath, wheezing and stridor.    Cardiovascular: Negative for chest pain, palpitations and leg swelling.   Gastrointestinal: Positive for abdominal distention, abdominal pain and diarrhea. Negative for constipation, nausea and vomiting.   Endocrine: Negative.    Genitourinary: Negative.    Musculoskeletal: Negative.    Skin: Negative.    Allergic/Immunologic: Negative.    Neurological: Negative for dizziness, tremors, seizures, syncope, facial asymmetry, speech difficulty, weakness, light-headedness, numbness and headaches.   Hematological: Negative.    Psychiatric/Behavioral: Negative.      Objective:     Vital Signs (Most Recent):  Temp: 99.1 °F (37.3 °C) (07/16/18 1622)  Pulse: 89 (07/16/18 1622)  Resp: 17 (07/16/18 1622)  BP: (!) 144/65 (07/16/18 1622)  SpO2: 96 % (07/16/18 1622) Vital Signs (24h Range):  Temp:  [98.8 °F (37.1 °C)-100.1 °F (37.8 °C)] 99.1 °F (37.3 °C)  Pulse:  [79-91] 89  Resp:  [15-26] 17  SpO2:  [94 %-98 %] 96 %  BP: (126-151)/(49-69) 144/65     Weight: 63.1 kg (139 lb 1.8 oz)  Body mass index is 23.15 kg/m².    Intake/Output Summary (Last 24 hours) at 07/16/18 1715  Last data filed at 07/16/18 1301   Gross per 24 hour   Intake          4331.67 ml   Output               35 ml   Net          4296.67 ml      Physical Exam   Constitutional: She is oriented to person, place, and time. She appears well-developed. She is cooperative. She is easily aroused. No distress.   HENT:   Head: Normocephalic and atraumatic.   Eyes: EOM are normal.   Neck: Normal range of motion. Neck supple.   Cardiovascular: Normal rate, regular rhythm, normal heart sounds and intact distal pulses.    No murmur heard.  Pulmonary/Chest: Effort normal and breath sounds normal. No respiratory distress. She has no wheezes. She has no  rales. She exhibits no tenderness.   Abdominal: Soft. Bowel sounds are normal. She exhibits no distension. There is no tenderness. There is no rigidity, no rebound, no guarding and no CVA tenderness.   Abdominal dressing C/D/I  JOSE drains x 2 -- scant serous fluid noted   Genitourinary:   Genitourinary Comments: Deferred   Musculoskeletal: Normal range of motion.   Neurological: She is alert, oriented to person, place, and time and easily aroused. No sensory deficit.   Skin: Skin is warm and dry. Capillary refill takes less than 2 seconds.   Psychiatric: She has a normal mood and affect. Her behavior is normal. Judgment and thought content normal.   Nursing note and vitals reviewed.      Significant Labs:   CBC:   Recent Labs  Lab 07/15/18  0502 07/16/18 0418   WBC 12.35 12.10   HGB 7.6* 7.1*   HCT 22.7* 21.6*    395*     CMP:   Recent Labs  Lab 07/15/18  0502 07/16/18 0418 07/16/18  1244    139 137   K 3.0* 2.9* 3.4*    108 109   CO2 23 22* 22*   GLU 84 74 80   BUN 2* 2* 2*   CREATININE 0.8 0.8 0.8   CALCIUM 9.2 8.9 8.8   PROT 6.0 6.0  --    ALBUMIN 2.2* 2.2*  --    BILITOT 1.1* 1.0  --    ALKPHOS 119 121  --    AST 25 18  --    ALT 9* 7*  --    ANIONGAP 7* 9 6*   EGFRNONAA >60 >60 >60     Magnesium:   Recent Labs  Lab 07/15/18  0502 07/16/18  0418   MG 1.8 1.9       Significant Imaging:   Imaging Results          CT Abdomen Pelvis With Contrast (Final result)  Result time 07/07/18 11:30:11    Final result by Arpan Del Castillo MD (07/07/18 11:30:11)                 Impression:      1.  Significant thickening of the mid sigmoid colon associated with diverticula, with surrounding edema.  Associated ascites in the cul-de-sac and around the liver.  Findings are concerning for uncomplicated diverticulitis of the sigmoid colon.  Negative for free air or focal drainable fluid collection.    2. Negative for acute process involving the abdomen or pelvis otherwise.  Normal appendix.  Negative for renal  stone disease or hydronephrosis.    3.  Moderate to severe multifactorial spinal canal stenosis at L4/L5 and L5/S1.  Clinical correlation is advised.    4.  Avascular necrosis changes to the left greater than right hip joints.  Clinical correlation is advised.    5.  Cholelithiasis.  Small fat filled umbilical hernia.  Other nonemergent findings as described above.    All CT scans at this facility used dose modulation, iterative reconstruction, and/or weight based dosing when appropriate to reduce radiation dose to as low as reasonably achievable.      Electronically signed by: Arpan Del Castillo MD  Date:    07/07/2018  Time:    11:30             Narrative:    EXAMINATION:  CT ABDOMEN PELVIS WITH CONTRAST    CLINICAL HISTORY:  Lower abdominal pain;    TECHNIQUE:  Axial images through the abdomen and pelvis were obtained with the use of IV contrast.  Sagittal and coronal reconstructions are provided for review.  Oral contrast was not utilized.    COMPARISON:  None    FINDINGS:  No comparison studies are available.    LUNG BASES:   Lung bases are clear.  Negative for pleural or pericardial effusions. The distal esophagus is normal.    ABDOMEN: Gallstones.  The liver, spleen and gallbladder otherwise appear normal.  The pancreas is unremarkable.  Kidneys and adrenal glands are normal.    Negative for adenopathy noted within the abdomen or pelvis.  Vascular calcifications are present without aneurysmal changes. Portal vein is patent.    The stomach and small bowel appear normal.  There is significant edema within sigmoid colon with associated diverticula.  With moderate surrounding edema.  Mild to moderate ascites in the cul-de-sac and mild ascites around the liver.  The rest of the colon appears normal.  A normal appendix.    PELVIS: The urinary bladder is unremarkable.    The female pelvic organs are normal. There are pelvic phleboliths.    No significant osseous abnormality is identified.  Broad-based disc bulge at L4/L5  and L5/S1 with moderate to severe multifactorial spinal canal stenosis.  There are significant degenerative changes a changes of the femoral heads.  Osteopenia.    Negative for groin adenopathy.    Tiny fat filled umbilical hernia.  The abdominal wall is otherwise intact.                               X-Ray Chest AP Portable (Final result)  Result time 07/07/18 10:18:44    Final result by Arpan Del Castillo MD (07/07/18 10:18:44)                 Impression:      1.  Negative for acute process involving the chest, considering there are lower lung volumes on today's study.    2.  Stable findings as noted above.      Electronically signed by: Arpan Del Castillo MD  Date:    07/07/2018  Time:    10:18             Narrative:    EXAMINATION:  XR CHEST AP PORTABLE    CLINICAL HISTORY:  Sepsis;    COMPARISON:  2 June 2014    FINDINGS:  EKG leads overlie the chest.  Mildly low lung volumes.  The lungs are otherwise clear.  The cardiac silhouette size is normal. The trachea is midline and the mediastinal width is normal. Negative for focal infiltrate, effusion or pneumothorax. Pulmonary vasculature is normal. Negative for osseous abnormalities. Tortuous aorta.  Convex left curvature of the lower thoracic spine.  Stable calcifications within the central portions of both humeri, possibly bone infarctions.

## 2018-07-16 NOTE — PLAN OF CARE
Problem: Patient Care Overview  Goal: Plan of Care Review  Outcome: Ongoing (interventions implemented as appropriate)  Flushed JOSE drains during shift. VSS. Pt NPO for procedure.  Fall precautions in place. Call light and personal items within reach. Educated patient on side effects of medication administered. Pt verbalized understanding. 24 hour order check done.  Will continue to monitor.

## 2018-07-17 VITALS
HEIGHT: 65 IN | BODY MASS INDEX: 22.11 KG/M2 | SYSTOLIC BLOOD PRESSURE: 146 MMHG | TEMPERATURE: 98 F | RESPIRATION RATE: 17 BRPM | OXYGEN SATURATION: 98 % | HEART RATE: 83 BPM | WEIGHT: 132.69 LBS | DIASTOLIC BLOOD PRESSURE: 67 MMHG

## 2018-07-17 LAB
ALBUMIN SERPL BCP-MCNC: 2.2 G/DL
ALP SERPL-CCNC: 118 U/L
ALT SERPL W/O P-5'-P-CCNC: 7 U/L
ANION GAP SERPL CALC-SCNC: 7 MMOL/L
AST SERPL-CCNC: 23 U/L
BASOPHILS # BLD AUTO: 0.02 K/UL
BASOPHILS NFR BLD: 0.2 %
BILIRUB SERPL-MCNC: 0.9 MG/DL
BUN SERPL-MCNC: 3 MG/DL
CALCIUM SERPL-MCNC: 8.8 MG/DL
CHLORIDE SERPL-SCNC: 110 MMOL/L
CO2 SERPL-SCNC: 22 MMOL/L
CREAT SERPL-MCNC: 0.8 MG/DL
DIFFERENTIAL METHOD: ABNORMAL
EOSINOPHIL # BLD AUTO: 0.1 K/UL
EOSINOPHIL NFR BLD: 1.3 %
ERYTHROCYTE [DISTWIDTH] IN BLOOD BY AUTOMATED COUNT: 13.9 %
EST. GFR  (AFRICAN AMERICAN): >60 ML/MIN/1.73 M^2
EST. GFR  (NON AFRICAN AMERICAN): >60 ML/MIN/1.73 M^2
GLUCOSE SERPL-MCNC: 79 MG/DL
HCT VFR BLD AUTO: 22.3 %
HGB BLD-MCNC: 7.1 G/DL
LYMPHOCYTES # BLD AUTO: 2.8 K/UL
LYMPHOCYTES NFR BLD: 29.9 %
MAGNESIUM SERPL-MCNC: 1.9 MG/DL
MCH RBC QN AUTO: 30.2 PG
MCHC RBC AUTO-ENTMCNC: 31.8 G/DL
MCV RBC AUTO: 95 FL
MONOCYTES # BLD AUTO: 0.8 K/UL
MONOCYTES NFR BLD: 8.5 %
NEUTROPHILS # BLD AUTO: 5.7 K/UL
NEUTROPHILS NFR BLD: 60.1 %
PLATELET # BLD AUTO: 447 K/UL
PMV BLD AUTO: 9.3 FL
POTASSIUM SERPL-SCNC: 3.4 MMOL/L
PROT SERPL-MCNC: 6.1 G/DL
RBC # BLD AUTO: 2.35 M/UL
SODIUM SERPL-SCNC: 139 MMOL/L
WBC # BLD AUTO: 9.48 K/UL

## 2018-07-17 PROCEDURE — 25000003 PHARM REV CODE 250: Performed by: NURSE PRACTITIONER

## 2018-07-17 PROCEDURE — 63600175 PHARM REV CODE 636 W HCPCS: Performed by: NURSE PRACTITIONER

## 2018-07-17 PROCEDURE — 80053 COMPREHEN METABOLIC PANEL: CPT

## 2018-07-17 PROCEDURE — 97802 MEDICAL NUTRITION INDIV IN: CPT

## 2018-07-17 PROCEDURE — 83735 ASSAY OF MAGNESIUM: CPT

## 2018-07-17 PROCEDURE — 36415 COLL VENOUS BLD VENIPUNCTURE: CPT

## 2018-07-17 PROCEDURE — 63600175 PHARM REV CODE 636 W HCPCS: Performed by: INTERNAL MEDICINE

## 2018-07-17 PROCEDURE — 25000003 PHARM REV CODE 250: Performed by: EMERGENCY MEDICINE

## 2018-07-17 PROCEDURE — 85025 COMPLETE CBC W/AUTO DIFF WBC: CPT

## 2018-07-17 PROCEDURE — 99231 SBSQ HOSP IP/OBS SF/LOW 25: CPT | Mod: ,,, | Performed by: PHYSICIAN ASSISTANT

## 2018-07-17 PROCEDURE — 02HV33Z INSERTION OF INFUSION DEVICE INTO SUPERIOR VENA CAVA, PERCUTANEOUS APPROACH: ICD-10-PCS

## 2018-07-17 PROCEDURE — 25000003 PHARM REV CODE 250: Performed by: INTERNAL MEDICINE

## 2018-07-17 RX ORDER — AMPICILLIN AND SULBACTAM 2; 1 G/1; G/1
3 INJECTION, POWDER, FOR SOLUTION INTRAMUSCULAR; INTRAVENOUS EVERY 6 HOURS
Qty: 168 G | Refills: 0
Start: 2018-07-17 | End: 2018-07-17 | Stop reason: HOSPADM

## 2018-07-17 RX ORDER — HYDROCODONE BITARTRATE AND ACETAMINOPHEN 7.5; 325 MG/1; MG/1
1 TABLET ORAL EVERY 6 HOURS PRN
Qty: 12 TABLET | Refills: 0 | Status: SHIPPED | OUTPATIENT
Start: 2018-07-17 | End: 2018-10-11

## 2018-07-17 RX ORDER — AMPICILLIN AND SULBACTAM 100; 50 MG/ML; MG/ML
3 INJECTION, POWDER, FOR SOLUTION INTRAVENOUS EVERY 6 HOURS
Qty: 168 G | Refills: 0
Start: 2018-07-17 | End: 2018-07-17 | Stop reason: HOSPADM

## 2018-07-17 RX ORDER — AMOXICILLIN AND CLAVULANATE POTASSIUM 875; 125 MG/1; MG/1
1 TABLET, FILM COATED ORAL EVERY 12 HOURS
Qty: 42 TABLET | Refills: 0 | Status: SHIPPED | OUTPATIENT
Start: 2018-07-17 | End: 2018-08-07

## 2018-07-17 RX ORDER — LEVOFLOXACIN 750 MG/1
750 TABLET ORAL DAILY
Qty: 7 TABLET | Refills: 0 | Status: SHIPPED | OUTPATIENT
Start: 2018-07-17 | End: 2018-07-24

## 2018-07-17 RX ADMIN — SODIUM CHLORIDE: 0.9 INJECTION, SOLUTION INTRAVENOUS at 10:07

## 2018-07-17 RX ADMIN — LINEZOLID 600 MG: 600 INJECTION, SOLUTION INTRAVENOUS at 10:07

## 2018-07-17 RX ADMIN — MICAFUNGIN SODIUM 100 MG: 20 INJECTION, POWDER, LYOPHILIZED, FOR SOLUTION INTRAVENOUS at 07:07

## 2018-07-17 RX ADMIN — SODIUM CHLORIDE 3 G: 9 INJECTION, SOLUTION INTRAVENOUS at 01:07

## 2018-07-17 RX ADMIN — ROSUVASTATIN CALCIUM 20 MG: 10 TABLET, FILM COATED ORAL at 10:07

## 2018-07-17 RX ADMIN — PIPERACILLIN SODIUM AND TAZOBACTAM SODIUM 4.5 G: 4; .5 INJECTION, POWDER, LYOPHILIZED, FOR SOLUTION INTRAVENOUS at 02:07

## 2018-07-17 RX ADMIN — CYPROHEPTADINE HYDROCHLORIDE 4 MG: 4 TABLET ORAL at 02:07

## 2018-07-17 RX ADMIN — HEPARIN SODIUM 5000 UNITS: 5000 INJECTION, SOLUTION INTRAVENOUS; SUBCUTANEOUS at 10:07

## 2018-07-17 RX ADMIN — HEPARIN SODIUM 5000 UNITS: 5000 INJECTION, SOLUTION INTRAVENOUS; SUBCUTANEOUS at 01:07

## 2018-07-17 RX ADMIN — BUPROPION HYDROCHLORIDE 150 MG: 150 TABLET, FILM COATED, EXTENDED RELEASE ORAL at 10:07

## 2018-07-17 RX ADMIN — ENALAPRIL MALEATE 20 MG: 10 TABLET ORAL at 10:07

## 2018-07-17 RX ADMIN — PIPERACILLIN SODIUM AND TAZOBACTAM SODIUM 4.5 G: 4; .5 INJECTION, POWDER, LYOPHILIZED, FOR SOLUTION INTRAVENOUS at 10:07

## 2018-07-17 RX ADMIN — CYPROHEPTADINE HYDROCHLORIDE 4 MG: 4 TABLET ORAL at 10:07

## 2018-07-17 RX ADMIN — FERROUS GLUCONATE TAB 324 MG (37.5 MG ELEMENTAL IRON) 324 MG: 324 (37.5 FE) TAB at 10:07

## 2018-07-17 NOTE — PROGRESS NOTES
"  [facility] -   Adult Nutrition  Progress Note    SUMMARY     Recommendations    Recommendation/Intervention: 1. Add cardiac restriction to current diet order. 2. Add boost plus TID. 3. Will continue to encourage PO intake.   Goals: PO intake > 75 % by next RD visit  Nutrition Goal Status: new  Communication of RD Recs:  (POC, sticky note)    Reason for Assessment    Reason for Assessment: length of stay  Dx:  1. Diverticulitis large intestine w/o perforation or abscess w/o bleeding    2. Abdominal pain    3. HIV (human immunodeficiency virus infection)    4. Sepsis, due to unspecified organism    5. Lactic acidosis    6. Tobacco abuse    7. Diverticulitis of large intestine with abscess without bleeding      Past Medical History:   Diagnosis Date    Anemia     Cholelithiasis     CVA (cerebral vascular accident)     Diverticulitis     Glaucoma     HIV disease     HLD (hyperlipidemia)     HTN (hypertension)        General Information Comments: Presented to the ER x suprapubic abdominal pain. Pt currently on Regular diet. Pt reports that diet is well tolerated (no N/V/D). Pt reports PO intake ~ 50 % today. Pt agreed to try oral supplements.   Nutrition Discharge Planning: Cardiac diet     Nutrition Risk Screen    Nutrition Risk Screen: no indicators present    Nutrition/Diet History    Food Preferences: no Jew/cultural food preferences noted    Anthropometrics    Temp: 98.6 °F (37 °C)  Height Method: Stated  Height: 5' 5" (165.1 cm)  Height (inches): 65 in  Weight Method: Bed Scale  Weight: 60.2 kg (132 lb 11.5 oz)  Weight (lb): 132.72 lb  Ideal Body Weight (IBW), Female: 125 lb  % Ideal Body Weight, Female (lb): 106.18 lb  BMI (Calculated): 22.1  BMI Grade: 18.5-24.9 - normal       Lab/Procedures/Meds    Pertinent Labs Reviewed: reviewed  BMP  Lab Results   Component Value Date     07/17/2018    K 3.4 (L) 07/17/2018     07/17/2018    CO2 22 (L) 07/17/2018    BUN 3 (L) " 07/17/2018    CREATININE 0.8 07/17/2018    CALCIUM 8.8 07/17/2018    ANIONGAP 7 (L) 07/17/2018    ESTGFRAFRICA >60 07/17/2018    EGFRNONAA >60 07/17/2018     Lab Results   Component Value Date    CALCIUM 8.8 07/17/2018     Lab Results   Component Value Date    ALBUMIN 2.2 (L) 07/17/2018       Pertinent Medications Reviewed: reviewed    Physical Findings/Assessment    Overall Physical Appearance:  (normal weight)  Oral/Mouth Cavity: tooth/teeth missing  Skin:  (closed/suction drain abdomen)    Estimated/Assessed Needs    Weight Used For Calorie Calculations: 60.2 kg (132 lb 11.5 oz)  Energy Calorie Requirements (kcal): 1524  Energy Need Method: [place]-St Jeor (x1.3)  Protein Requirements: 72g   Weight Used For Protein Calculations: 60.2 kg (132 lb 11.5 oz)     Fluid Need Method: RDA Method (or per MD)  RDA Method (mL): 1524         Nutrition Prescription Ordered    Current Diet Order: Regular diet    Evaluation of Received Nutrient/Fluid Intake          Intake/Output Summary (Last 24 hours) at 07/17/18 1256  Last data filed at 07/17/18 1206   Gross per 24 hour   Intake             4460 ml   Output               36 ml   Net             4424 ml       % Intake of Estimated Energy Needs: 50 - 75 %  % Meal Intake: 50 - 75 %    Nutrition Risk      1xweekly    Assessment and Plan    Nutrition Problem  Inadequate energy intake     Related to (etiology):   Decreased appetite     Signs and Symptoms (as evidenced by):   PO intake ~ 50 %     Interventions/Recommendations (treatment strategy):  See above     Nutrition Diagnosis Status:   New        Monitor and Evaluation    Food and Nutrient Intake: energy intake, food and beverage intake  Food and Nutrient Adminstration: diet order  Anthropometric Measurements: weight  Biochemical Data, Medical Tests and Procedures: electrolyte and renal panel, glucose/endocrine profile  Nutrition-Focused Physical Findings: overall appearance     Nutrition Follow-Up    RD Follow-up?: Yes  (1xweekly)

## 2018-07-17 NOTE — PLAN OF CARE
CM received a consult for home IV antibiotics.  CM spoke to patient regarding plan.  Patient does not have a preference.  Choice form completed and witnessed.  CM made referral via right care to [facility]

## 2018-07-17 NOTE — PLAN OF CARE
Problem: Patient Care Overview  Goal: Plan of Care Review  Outcome: Ongoing (interventions implemented as appropriate)  Recommendations     Recommendation/Intervention: 1. Add cardiac restriction to current diet order. 2. Add boost plus TID. 3. Will continue to encourage PO intake.   Goals: PO intake > 75 % by next RD visit  Nutrition Goal Status: new  Communication of RD Recs:  (POC, sticky note)

## 2018-07-17 NOTE — PT/OT/SLP PROGRESS
Physical Therapy      Patient Name:  Lizz Noguera   MRN:  [id number]    09:20 PT NOT PRESENT IN RM . PT OUT OF RM FOR PICC LINE. P.T. TO TX NEXT VISIT.     Angela Moon, PT,7/17/2018

## 2018-07-17 NOTE — ASSESSMENT & PLAN NOTE
S/p IR drainage.   Drains are clear. Afebrile.   Ok for discharge. Needs home health for drain care including flushing the drain daily with 10cc saline. Measure and record drain output.   Follow up within 1 week with Gen Surg and needs CT before follow up.

## 2018-07-17 NOTE — PLAN OF CARE
PICC line dressing clean, dry, and intact. Both lumen flushes w/o difficulty, blood return noted on both. JOSE drain dressing clean, dry, and intact. Both JOSE draining serous fluid, flushed 10 cc NS. Denies pain. Adequate for dc.

## 2018-07-17 NOTE — SUBJECTIVE & OBJECTIVE
Interval History: no complaints. Pain improved and well controlled. Afebrile.     Medications:  Continuous Infusions:   sodium chloride 0.9% 100 mL/hr at 07/17/18 1010     Scheduled Meds:   buPROPion  150 mg Oral BID    cyproheptadine  4 mg Oral TID    darunavir-cobicistat  1 tablet Oral Daily    enalapril  20 mg Oral Daily    ferrous gluconate  324 mg Oral Daily    heparin (porcine)  5,000 Units Subcutaneous Q8H    linezolid 600mg/300ml  600 mg Intravenous Q12H    micafungin (MYCAMINE) IVPB  100 mg Intravenous Q24H    piperacillin-tazobactam (ZOSYN) IVPB  4.5 g Intravenous Q8H    rosuvastatin  20 mg Oral Daily    senna-docusate 8.6-50 mg  2 tablet Oral QHS     PRN Meds:acetaminophen, albuterol-ipratropium, dextrose 50%, dextrose 50%, glucagon (human recombinant), glucose, glucose, HYDROcodone-acetaminophen, ibuprofen, insulin aspart U-100, morphine, ondansetron, sodium chloride 0.9%     Review of patient's allergies indicates:   Allergen Reactions    Bactrim [sulfamethoxazole-trimethoprim] Rash     Objective:     Vital Signs (Most Recent):  Temp: 98.6 °F (37 °C) (07/17/18 1126)  Pulse: 83 (07/17/18 1126)  Resp: 18 (07/17/18 1126)  BP: 137/64 (07/17/18 1126)  SpO2: 97 % (07/17/18 1126) Vital Signs (24h Range):  Temp:  [98.3 °F (36.8 °C)-99.5 °F (37.5 °C)] 98.6 °F (37 °C)  Pulse:  [77-89] 83  Resp:  [16-18] 18  SpO2:  [94 %-98 %] 97 %  BP: (137-149)/(63-67) 137/64     Weight: 60.2 kg (132 lb 11.5 oz)  Body mass index is 22.09 kg/m².    Intake/Output - Last 3 Shifts       07/15 0700 - 07/16 0659 07/16 0700 - 07/17 0659 07/17 0700 - 07/18 0659    P.O. 620 240 240    I.V. (mL/kg) 2505 (39.7) 2293.3 (38.1) 340 (5.6)    IV Piggyback 900 1100 100    Total Intake(mL/kg) 4025 (63.8) 3633.3 (60.4) 680 (11.3)    Drains 65 36     Total Output 65 36      Net +3960 +3597.3 +680           Urine Occurrence 4 x 5 x 2 x    Stool Occurrence 0 x 1 x           Physical Exam    Significant Labs:  CBC:   Recent Labs  Lab  07/17/18  0521   WBC 9.48   RBC 2.35*   HGB 7.1*   HCT 22.3*   *   MCV 95   MCH 30.2   MCHC 31.8*     CMP:   Recent Labs  Lab 07/17/18  0521   GLU 79   CALCIUM 8.8   ALBUMIN 2.2*   PROT 6.1      K 3.4*   CO2 22*      BUN 3*   CREATININE 0.8   ALKPHOS 118   ALT 7*   AST 23   BILITOT 0.9       Significant Diagnostics:  I have reviewed all pertinent imaging results/findings within the past 24 hours.

## 2018-07-17 NOTE — NURSING
"PICC line instructions given to pt and SO. Teach back method used. Pt and SO verbalized understanding. States "I can't get this wet, I can't lift heavy stuff with it." Pt and SO also verbalized "This med (ref to anti fungal ivpb), I take this once a day." JOSE drain care x2 discussed, teach back method done, pt emptied JOSE drain and measured it. Demonstrated understanding. Discharge instructions, medications, and appointments reviewed with patient and SO. Both verbalized understanding, teach back method used. Questions answered. Prescription given. No further needs.     "

## 2018-07-17 NOTE — PROGRESS NOTES
[facility] -   General Surgery  Progress Note    Subjective:     History of Present Illness:  Lizz Noguera is a 57 y.o. female patient with a PMHx of HIV, CVA with right sided residual weakness, HLP, depression. She initially presented to the ER for suprapubic abd pain and was found to have acute uncomplicated diverticulitis. She was admitted and treated with IV antibiotics. She initially improved, however yesterday she had a fever of 101 and increasing abdominal tenderness and distention. A CT was repeated and is concerning for perforated diverticulitis with abscess. General Surgery was consulted.    Post-Op Info:  * No surgery found *         Interval History: no complaints. Pain improved and well controlled. Afebrile.     Medications:  Continuous Infusions:   sodium chloride 0.9% 100 mL/hr at 07/17/18 1010     Scheduled Meds:   buPROPion  150 mg Oral BID    cyproheptadine  4 mg Oral TID    darunavir-cobicistat  1 tablet Oral Daily    enalapril  20 mg Oral Daily    ferrous gluconate  324 mg Oral Daily    heparin (porcine)  5,000 Units Subcutaneous Q8H    linezolid 600mg/300ml  600 mg Intravenous Q12H    micafungin (MYCAMINE) IVPB  100 mg Intravenous Q24H    piperacillin-tazobactam (ZOSYN) IVPB  4.5 g Intravenous Q8H    rosuvastatin  20 mg Oral Daily    senna-docusate 8.6-50 mg  2 tablet Oral QHS     PRN Meds:acetaminophen, albuterol-ipratropium, dextrose 50%, dextrose 50%, glucagon (human recombinant), glucose, glucose, HYDROcodone-acetaminophen, ibuprofen, insulin aspart U-100, morphine, ondansetron, sodium chloride 0.9%     Review of patient's allergies indicates:   Allergen Reactions    Bactrim [sulfamethoxazole-trimethoprim] Rash     Objective:     Vital Signs (Most Recent):  Temp: 98.6 °F (37 °C) (07/17/18 1126)  Pulse: 83 (07/17/18 1126)  Resp: 18 (07/17/18 1126)  BP: 137/64 (07/17/18 1126)  SpO2: 97 % (07/17/18 1126) Vital Signs (24h Range):  Temp:  [98.3 °F (36.8 °C)-99.5 °F  (37.5 °C)] 98.6 °F (37 °C)  Pulse:  [77-89] 83  Resp:  [16-18] 18  SpO2:  [94 %-98 %] 97 %  BP: (137-149)/(63-67) 137/64     Weight: 60.2 kg (132 lb 11.5 oz)  Body mass index is 22.09 kg/m².    Intake/Output - Last 3 Shifts       07/15 0700 - 07/16 0659 07/16 0700 - 07/17 0659 07/17 0700 - 07/18 0659    P.O. 620 240 240    I.V. (mL/kg) 2505 (39.7) 2293.3 (38.1) 340 (5.6)    IV Piggyback 900 1100 100    Total Intake(mL/kg) 4025 (63.8) 3633.3 (60.4) 680 (11.3)    Drains 65 36     Total Output 65 36      Net +3960 +3597.3 +680           Urine Occurrence 4 x 5 x 2 x    Stool Occurrence 0 x 1 x           Physical Exam    Significant Labs:  CBC:   Recent Labs  Lab 07/17/18  0521   WBC 9.48   RBC 2.35*   HGB 7.1*   HCT 22.3*   *   MCV 95   MCH 30.2   MCHC 31.8*     CMP:   Recent Labs  Lab 07/17/18  0521   GLU 79   CALCIUM 8.8   ALBUMIN 2.2*   PROT 6.1      K 3.4*   CO2 22*      BUN 3*   CREATININE 0.8   ALKPHOS 118   ALT 7*   AST 23   BILITOT 0.9       Significant Diagnostics:  I have reviewed all pertinent imaging results/findings within the past 24 hours.    Assessment/Plan:     * Diverticulitis of large intestine with abscess without bleeding    S/p IR drainage.   Drains are clear. Afebrile.   Ok for discharge. Needs home health for drain care including flushing the drain daily with 10cc saline. Measure and record drain output.   Follow up within 1 week with Gen Surg and needs CT before follow up.         Pneumonia due to infectious organism    Pulmonary toileting  Antibiotics            Ramona Robbins PA-C  General Surgery  [facility] -

## 2018-07-17 NOTE — PLAN OF CARE
Copay for antibiotics will be $210.00.  Patient does not have a credit card or checking account to secure the monthly payments.  Patient is able to pay 80.00 today.  REINA communicated with REINA Crawley director.  [facility] will do letter of agreement for the remaining balance of $130.00.  REINA sent message to Kala with [employer] to provide letter of agreement for signatures.  REINA updated GRACIELA Solo

## 2018-07-17 NOTE — DISCHARGE SUMMARY
"[facility] Hospital Medicine  Discharge Summary      Patient Name: Lizz Noguera  MRN: [id number]  Admission Date: 7/7/2018  Hospital Length of Stay: 10 days  Discharge Date and Time:  07/17/2018 4:46 PM  Attending Physician: David Barnes MD   Discharging Provider: ALIS Barton  Primary Care Provider: Joseluis Doe MD (Inactive)      HPI:   Lizz Noguera is a 57 y.o. female patient with a PMHx of HIV, CVA with right sided residual weakness, HLP, depression, who presented to the ER for suprapubic abd pain which onset suddenly this AM. Associated sxs included constipation. Patient denies any n/v/d, hematochezia, dysuria, hematuria, difficulty urinating, frequency, fever, chills, and all other sxs at this time. Pt denies any history of abdominal surgeries. She still has her appendix. CT scan showed uncomplicated diverticulitis of the sigmoid without abscess. Blood cultures pending. In ER, Temp 103.7, glucose 148, lactic acid 2.5. She is admitted with diverticulitis on IV flagyl and IV cipro.     * No surgery found *      Hospital Course:   Lizz Noguera is a 57 year old female with PMHx HIV, CVA, HLP who presented to complaints of lower abdominal pain. Associated symptoms include constipation. CT scan of the abdomen showed uncomplicated diverticulitis of the sigmoid without abscess. She is currently on Cipro IV and Flagyl IV. She received Senna-docusate and Miralax on yesterday with no BM results. She continues to complain of constipation. Will give mag citrate X 1 and monitor. Fever overnight of 101.4 on 07/10/18. Blood cultures (+) gram negative rods, BACTEROIDES VULGATUS. Susceptibilities pending. Pt currently receiving IV Zosyn and Cipro. Diet advanced, tolerated without abdominal pain, N/V. Pt reports, "my stomach is bigger than normal, I can barely see my belly button." Mild distension noted. Due to fever and abdominal distension obtained repeat CT. CT abdomen/pelvis with contrast " showed anterior pelvic abscess measuring 8.5 cm with several foci of free air adjacent to the sigmoid colon in the mesentery and liver likely from perforated diverticulum. General surgery consulted and recommended ct guided abscess drainage to be performed by IR. Antibiotics changed to IV Zosyn only. Repeat blood cultures with NGTD. S/p ct guided drainage of abscess, cultures sent which are currently pending. Has 2 JOSE drains placed by IR. Max temp of 101.2 overnight. Repeat blood cultures NGTD. Discussed case with ID who recommended repeat CT chest/abdomen/pelvis due to continued high grade fevers. Abdominal pain is improving, reporting bowel movements are becoming more formed. Per General surgery, may still require sigmoid resection for diverticulitis however there is also the possibility that she could develop a small-bowel obstruction and this will be monitored. Repeat CT chest/abdomen/pelvis with contrast showed worsening multilobar pneumonia and mild enlargement of a small left lower anterior abdominal fluid collection from 2.1 to 2.3 cm which may represent a developing abscesses. Discussed case with Dr. Colindres -- continues to have fever, could be from pneumonia and/or abscess. Recommended consulting IR and see if the abscess could be aspirated. No need for surgical procedure at this time. SLP evaluation for possible aspiration. Antibiotics changed due to wound cultures (+) enterococcus avium (susceptibilities pending) and candida glabrata. Vancomycin d/c. IV Zyvox and Micafungin added by ID. IV Zosyn continued. NPO after midnight -- small increase in size of anterior fluid collection. High grade temps appear to be improving after changes in antibiotics. MBSS ordered for possible aspiration due to worsening mulilobar pnuemonia seen on imaging on 07/13/18. Concerns regarding barium with pt diagnosis of possible perforated diverticulum and abscess. SLP evaluated -- no coughing or delayed swallowing noted. Likely  not aspirating.  MBSS discontinued. IR consulted for small increase in size of anterior fluid collection -- less than 5 ml of reddish cloudy fluid removed. Wound cultures grew candida glabrata and enterococcus avium. Susceptibilities reviewed. Discussed case with ID  -- recommended Unasyn 3 gm IV every 6 hours and Micafungin 100 mg IV daily, both for 2 weeks upon discharge then repeat CT post antibiotics. PICC line placed for IV antibiotic therapy. Pt has been afebrile > 48 hrs. VSS. Pt has been set up with [facility]  for PICC /care and JOSE drain care. [facility] partners for antibiotic therapy. Pt unable to follow teachings for Unasyn QID. Discussed with Dr. Neal about learning curve, recommended Augmentin 875/125 PO BID x 21 days. Pt will continue Micafungin 100 mg IV daily x 2 weeks. Pt will follow up with Dr. Colindres (general surgery) on 07/23/18 at 9:40 AM for hospital follow up. Pt will need repeat CT abdomen/pelvis prior to clinic appt. Pt informed and verbalized understanding. Pt will also follow up with Dr. Neal (ID) within 1-2 weeks after discharge for hospital follow up. This patient was seen and examined on the date of discharge and determined suitable for discharge.      Consults:   Consults         Status Ordering Provider     Inpatient consult to General Surgery  Once     Provider:  Za Rivera PA-C    Completed ASHLEY DAI     Inpatient consult to Hospitalist  Once     Provider:  (Not yet assigned)    Acknowledged MICHAEL BEAUCHAMP     Inpatient consult to Infectious Diseases  Once     Provider:  Noble Neal MD    Acknowledged ASHLEY DAI     Inpatient consult to Interventional Radiology  Once     Provider:  Alec Martin MD    Completed ZA RIVERA     Inpatient consult to Interventional Radiology  Once     Provider:  Alec Martin MD    Completed ASHLEY DAI     [facility]  Once     Provider:  (Not yet assigned)     Acknowledged ASHLEY DAI     Inpatient consult to Social Work  Once     Provider:  (Not yet assigned)    Completed ZA RIVERA     Inpatient consult to Social Work  Once     Provider:  (Not yet assigned)    Completed ASHLEY DAI     IP consult to case management  Once     Provider:  (Not yet assigned)    Completed DONTE JASON     Pharmacy to dose Vancomycin consult  Once     Provider:  (Not yet assigned)    Acknowledged ASHLEY DAI          Final Active Diagnoses:    Diagnosis Date Noted POA    PRINCIPAL PROBLEM:  Diverticulitis of large intestine with abscess without bleeding [K57.20] 07/07/2018 Yes    Gram-negative bacteremia [R78.81] 07/10/2018 Yes    Fever [R50.9] 07/13/2018 Unknown    Hypertension [I10] 07/13/2018 Yes    Pneumonia due to infectious organism [J18.9] 07/11/2018 Unknown    Hypokalemia [E87.6] 07/10/2018 Yes    Tobacco abuse [Z72.0] 07/07/2018 Yes    HIV (human immunodeficiency virus infection) [B20] 07/07/2018 Yes      Problems Resolved During this Admission:    Diagnosis Date Noted Date Resolved POA    Perforated diverticulum [K57.80] 07/11/2018 07/15/2018 No    Lactic acidosis [E87.2] 07/07/2018 07/10/2018 Yes    Slow transit constipation [K59.01] 07/07/2018 07/10/2018 Yes       Discharged Condition: stable    Disposition: Home-Health Care [facility]    Follow Up:  Follow-up Information     [facility].    Specialty:  Home Health Services  Contact information:  [address], [address]  [place] [address]  [phone]             Bernard Colindres MD On 7/23/2018.    Specialties:  General Surgery, Bariatrics  Contact information:  [address] [place] [address]  [phone]             [facility].    Specialties:  Pharmacist, DME Provider, IV Infusion  Why:  IV antibiotics  Contact information:  [address]  [address]  [place] [address]  [phone]             Joseluis Doe MD.  Schedule an appointment as soon as possible for a visit in 3 days.    Why:  hospital follow up   Contact information:  [address]  [place] [address] [phone]             Noble Neal MD. Schedule an appointment as soon as possible for a visit in 1 week.    Specialty:  Infectious Diseases  Why:  hospital follow up  Contact information:  [address] [facility] [address]  [phone]                 Patient Instructions:     CT Abdomen Pelvis With Contrast   Standing Status: Future  Standing Exp. Date: 07/17/19   Order Specific Question Answer Comments   Is the patient allergic to iodine or contrast? Has a steroid / antihistamine prep been administered? No    Is the patient on ANY Metformin drug such as Glucophage/Glucovance?           Should be off drug 48 hours after contrast. Check renal function before restart. No    History of Kidney Disease - including: decreased kidney function, dialysis, kidney transplay, single kidney, kidney cancer, kidney surgery? None    Does the patient have high blood pressure requiring medical treatment? Yes    Diabetes? No    May the Radiologist modify the order per protocol to meet the clinical needs of the patient? Yes    Oral/Rectal Contrast instructions: Routine Oral Contrast    Special CT ABD Protocol Request? Routine      Ambulatory referral to Outpatient Case Management   Referral Priority: Routine Referral Type: Consultation   Referral Reason: Specialty Services Required    Number of Visits Requested: 1      Ambulatory referral to Home Health   Referral Priority: Routine Referral Type: Home Health   Referral Reason: Specialty Services Required    Requested Specialty: Home Health Services    Number of Visits Requested: 1      Notify your health care provider if you experience any of the following:  increased confusion or weakness     Notify your health care provider if you experience any of the following:  persistent dizziness,  light-headedness, or visual disturbances     Notify your health care provider if you experience any of the following:  difficulty breathing or increased cough     Notify your health care provider if you experience any of the following:  severe uncontrolled pain     Notify your health care provider if you experience any of the following:  redness, tenderness, or signs of infection (pain, swelling, redness, odor or green/yellow discharge around incision site)     Notify your health care provider if you experience any of the following:  temperature >100.4     Activity as tolerated         Significant Diagnostic Studies: Labs:   CMP   Recent Labs  Lab 07/16/18  0418 07/16/18  1244 07/17/18  0521    137 139   K 2.9* 3.4* 3.4*    109 110   CO2 22* 22* 22*   GLU 74 80 79   BUN 2* 2* 3*   CREATININE 0.8 0.8 0.8   CALCIUM 8.9 8.8 8.8   PROT 6.0  --  6.1   ALBUMIN 2.2*  --  2.2*   BILITOT 1.0  --  0.9   ALKPHOS 121  --  118   AST 18  --  23   ALT 7*  --  7*   ANIONGAP 9 6* 7*   ESTGFRAFRICA >60 >60 >60   EGFRNONAA >60 >60 >60    and CBC   Recent Labs  Lab 07/16/18  0418 07/17/18  0521   WBC 12.10 9.48   HGB 7.1* 7.1*   HCT 21.6* 22.3*   * 447*     Microbiology:   Blood Culture   Lab Results   Component Value Date    LABBLOO No growth after 5 days. 07/10/2018    LABBLOO No growth after 5 days. 07/10/2018    and Wound Culture: positive for enterococcus avium and candida glabrata  Radiology:   Imaging Results          CT Abdomen Pelvis With Contrast (Final result)  Result time 07/07/18 11:30:11    Final result by Arpan Del Castillo MD (07/07/18 11:30:11)                 Impression:      1.  Significant thickening of the mid sigmoid colon associated with diverticula, with surrounding edema.  Associated ascites in the cul-de-sac and around the liver.  Findings are concerning for uncomplicated diverticulitis of the sigmoid colon.  Negative for free air or focal drainable fluid collection.    2. Negative for acute  process involving the abdomen or pelvis otherwise.  Normal appendix.  Negative for renal stone disease or hydronephrosis.    3.  Moderate to severe multifactorial spinal canal stenosis at L4/L5 and L5/S1.  Clinical correlation is advised.    4.  Avascular necrosis changes to the left greater than right hip joints.  Clinical correlation is advised.    5.  Cholelithiasis.  Small fat filled umbilical hernia.  Other nonemergent findings as described above.    All CT scans at this facility used dose modulation, iterative reconstruction, and/or weight based dosing when appropriate to reduce radiation dose to as low as reasonably achievable.      Electronically signed by: Arpan Del Castillo MD  Date:    07/07/2018  Time:    11:30             Narrative:    EXAMINATION:  CT ABDOMEN PELVIS WITH CONTRAST    CLINICAL HISTORY:  Lower abdominal pain;    TECHNIQUE:  Axial images through the abdomen and pelvis were obtained with the use of IV contrast.  Sagittal and coronal reconstructions are provided for review.  Oral contrast was not utilized.    COMPARISON:  None    FINDINGS:  No comparison studies are available.    LUNG BASES:   Lung bases are clear.  Negative for pleural or pericardial effusions. The distal esophagus is normal.    ABDOMEN: Gallstones.  The liver, spleen and gallbladder otherwise appear normal.  The pancreas is unremarkable.  Kidneys and adrenal glands are normal.    Negative for adenopathy noted within the abdomen or pelvis.  Vascular calcifications are present without aneurysmal changes. Portal vein is patent.    The stomach and small bowel appear normal.  There is significant edema within sigmoid colon with associated diverticula.  With moderate surrounding edema.  Mild to moderate ascites in the cul-de-sac and mild ascites around the liver.  The rest of the colon appears normal.  A normal appendix.    PELVIS: The urinary bladder is unremarkable.    The female pelvic organs are normal. There are pelvic  phleboliths.    No significant osseous abnormality is identified.  Broad-based disc bulge at L4/L5 and L5/S1 with moderate to severe multifactorial spinal canal stenosis.  There are significant degenerative changes a changes of the femoral heads.  Osteopenia.    Negative for groin adenopathy.    Tiny fat filled umbilical hernia.  The abdominal wall is otherwise intact.                               X-Ray Chest AP Portable (Final result)  Result time 07/07/18 10:18:44    Final result by Arpan Del Castillo MD (07/07/18 10:18:44)                 Impression:      1.  Negative for acute process involving the chest, considering there are lower lung volumes on today's study.    2.  Stable findings as noted above.      Electronically signed by: Arpan Del Castillo MD  Date:    07/07/2018  Time:    10:18             Narrative:    EXAMINATION:  XR CHEST AP PORTABLE    CLINICAL HISTORY:  Sepsis;    COMPARISON:  2 June 2014    FINDINGS:  EKG leads overlie the chest.  Mildly low lung volumes.  The lungs are otherwise clear.  The cardiac silhouette size is normal. The trachea is midline and the mediastinal width is normal. Negative for focal infiltrate, effusion or pneumothorax. Pulmonary vasculature is normal. Negative for osseous abnormalities. Tortuous aorta.  Convex left curvature of the lower thoracic spine.  Stable calcifications within the central portions of both humeri, possibly bone infarctions.                                Pending Diagnostic Studies:     None         Medications:  Reconciled Home Medications:      Medication List      START taking these medications    ampicillin-sulbactam 15 gram injection  Commonly known as:  UNASYN  Inject 3 g into the vein every 6 (six) hours. for 14 days     HYDROcodone-acetaminophen 7.5-325 mg per tablet  Commonly known as:  NORCO  Take 1 tablet by mouth every 6 (six) hours as needed for Pain.     levoFLOXacin 750 MG tablet  Commonly known as:  LEVAQUIN  Take 1 tablet (750 mg total) by  mouth once daily. for 7 days     MICAFUNGIN 100 MG/100 ML NS (READY TO MIX SYSTEM)  Inject 100 mLs (100 mg total) into the vein once daily. for 14 days        CONTINUE taking these medications    buPROPion 150 MG TBSR 12 hr tablet  Commonly known as:  WELLBUTRIN SR  Take 150 mg by mouth 2 (two) times daily.     cyproheptadine 4 mg tablet  Commonly known as:  PERIACTIN  Take 4 mg by mouth 3 (three) times daily.     darunavir-cobicistat 800-150 mg-mg Tab  Take 1 tablet by mouth once daily.     emtricitabine-tenofovir 200-300 mg 200-300 mg Tab  Commonly known as:  TRUVADA  Take 1 tablet by mouth once daily.     enalapril 20 MG tablet  Commonly known as:  VASOTEC  Take 20 mg by mouth.     ferrous gluconate 324 MG tablet  Commonly known as:  FERGON  Take 324 mg by mouth once daily.     multivitamin per tablet  Commonly known as:  THERAGRAN  Take by mouth.     rosuvastatin 20 MG tablet  Commonly known as:  CRESTOR  Take 20 mg by mouth once daily.            Indwelling Lines/Drains at time of discharge:   Lines/Drains/Airways     Peripherally Inserted Central Catheter Line                 PICC Double Lumen 07/17/18 0930 right brachial less than 1 day          Drain                 Closed/Suction Drain 07/11/18 1600 Anterior Abdomen Bulb 8.5 Fr. 6 days         Closed/Suction Drain 07/11/18 1600 Posterior Back Bulb 8.5 Fr. 6 days                Time spent on the discharge of patient: 35 minutes  Patient was seen and examined on the date of discharge and determined to be suitable for discharge.         ALIS Barton  Department of Hospital Medicine  [facility]

## 2018-07-17 NOTE — OP NOTE
The RUE was sterilely prepped and draped.  Lidocaine was used as a local anesthetic.  Using u/s guidance a 5 Hungarian 35 cm picc was placed into the brachial vein into the SVC/right atrium junction.  Placement was verified using X Ray.  PICC was secured in place with attachment device and is ready to use.  Pt tolerated proc well without immediate complications.

## 2018-07-17 NOTE — PLAN OF CARE
Problem: Patient Care Overview  Goal: Plan of Care Review  Outcome: Ongoing (interventions implemented as appropriate)  POC reviewed, including indications and possible side effects of administered medications. Patient verbalized understanding and teach back. No adverse reactions noted. Patient c/o back pain. Administered medications per order. JOSE drain care provided. VSS. Afebrile during shift. Patient remains free of injuries and falls during shift. Will continue to monitor.     24 hour chart check complete.

## 2018-07-18 ENCOUNTER — OUTPATIENT CASE MANAGEMENT (OUTPATIENT)
Dept: ADMINISTRATIVE | Facility: OTHER | Age: 58
End: 2018-07-18

## 2018-07-18 NOTE — PLAN OF CARE
Jul23 Hospital Follow Up with Bernard Colindres MD   Monday Jul 23, 2018 9:40 AM  [facility]   [address] [address]   [phone]         07/18/18 0751   Final Note   Assessment Type Final Discharge Note   Discharge Disposition [facility]   Hospital Follow Up  Appt(s) scheduled? Yes   Right Care Referral Info   Post Acute Recommendation Home-care   Facility Name [facility] [place]

## 2018-07-18 NOTE — NURSING
"Pt and spouse aware that CT has to be done prior to appt with Dr. Colindres. Verbalized "i can't eat or drink anything the night before, after 12 midnight"  "

## 2018-07-18 NOTE — PROGRESS NOTES
Thank you for the referral.  Patient has been assigned to Letty Julian LMSW for low risk screening for Outpatient Case Management.     Reason for referral: Diverticulitis large intestine w/o perforation or abscess w/o bleeding  HIV (human immunodeficiency virus infection)  Sepsis, due to unspecified organism  Tobacco abuse    Please contact [facility] at [phone] with any questions.    Cheri Norris    OPCM

## 2018-07-18 NOTE — PROGRESS NOTES
This LMSW  contacted patient regarding referral. Patient reports she and spouse resides together. Patient reports family checks on her everyday. Patient reports she is able to complete ADL's without assistance. Patient reports HH in the home. Patient denied needing assistance with food, shelter, medication or medical. Patient reports spouse provides transportation to and from [facility]. Patient denied information on Advance Care Planning.  Patient aware of hospital follow up appointment for 07/23/2018 . Patient stated there were no needs at this time.

## 2018-07-23 ENCOUNTER — LAB VISIT (OUTPATIENT)
Dept: LAB | Facility: HOSPITAL | Age: 58
End: 2018-07-23
Attending: INTERNAL MEDICINE
Payer: MEDICARE

## 2018-07-23 ENCOUNTER — OFFICE VISIT (OUTPATIENT)
Dept: SURGERY | Facility: CLINIC | Age: 58
End: 2018-07-23
Payer: MEDICARE

## 2018-07-23 VITALS
HEIGHT: 65 IN | WEIGHT: 124.75 LBS | BODY MASS INDEX: 20.79 KG/M2 | RESPIRATION RATE: 16 BRPM | HEART RATE: 93 BPM | DIASTOLIC BLOOD PRESSURE: 72 MMHG | SYSTOLIC BLOOD PRESSURE: 129 MMHG | TEMPERATURE: 98 F

## 2018-07-23 DIAGNOSIS — K57.20 DIVERTICULITIS OF LARGE INTESTINE WITH ABSCESS WITHOUT BLEEDING: ICD-10-CM

## 2018-07-23 DIAGNOSIS — B20 HIV (HUMAN IMMUNODEFICIENCY VIRUS INFECTION): Primary | ICD-10-CM

## 2018-07-23 DIAGNOSIS — R78.81 BACTEREMIA: Primary | ICD-10-CM

## 2018-07-23 PROBLEM — K57.40: Status: RESOLVED | Noted: 2018-07-11 | Resolved: 2018-07-23

## 2018-07-23 LAB
ANION GAP SERPL CALC-SCNC: 12 MMOL/L
BASOPHILS # BLD AUTO: 0 K/UL
BASOPHILS NFR BLD: 0 %
BUN SERPL-MCNC: 5 MG/DL
CALCIUM SERPL-MCNC: 9.6 MG/DL
CHLORIDE SERPL-SCNC: 106 MMOL/L
CO2 SERPL-SCNC: 22 MMOL/L
CREAT SERPL-MCNC: 0.8 MG/DL
CRP SERPL-MCNC: 13.6 MG/L
DIFFERENTIAL METHOD: ABNORMAL
EOSINOPHIL # BLD AUTO: 0.1 K/UL
EOSINOPHIL NFR BLD: 1.4 %
ERYTHROCYTE [DISTWIDTH] IN BLOOD BY AUTOMATED COUNT: 16.1 %
ERYTHROCYTE [SEDIMENTATION RATE] IN BLOOD BY WESTERGREN METHOD: 128 MM/HR
EST. GFR  (AFRICAN AMERICAN): >60 ML/MIN/1.73 M^2
EST. GFR  (NON AFRICAN AMERICAN): >60 ML/MIN/1.73 M^2
GLUCOSE SERPL-MCNC: 99 MG/DL
HCT VFR BLD AUTO: 29.6 %
HGB BLD-MCNC: 9.5 G/DL
LYMPHOCYTES # BLD AUTO: 2.2 K/UL
LYMPHOCYTES NFR BLD: 38.5 %
MCH RBC QN AUTO: 30.9 PG
MCHC RBC AUTO-ENTMCNC: 32.1 G/DL
MCV RBC AUTO: 96 FL
MONOCYTES # BLD AUTO: 0.6 K/UL
MONOCYTES NFR BLD: 10.1 %
NEUTROPHILS # BLD AUTO: 2.8 K/UL
NEUTROPHILS NFR BLD: 50.2 %
PLATELET # BLD AUTO: 585 K/UL
PMV BLD AUTO: 8.8 FL
POTASSIUM SERPL-SCNC: 4 MMOL/L
RBC # BLD AUTO: 3.07 M/UL
SODIUM SERPL-SCNC: 140 MMOL/L
WBC # BLD AUTO: 5.64 K/UL

## 2018-07-23 PROCEDURE — 99213 OFFICE O/P EST LOW 20 MIN: CPT | Mod: S$PBB,,, | Performed by: SURGERY

## 2018-07-23 PROCEDURE — 99213 OFFICE O/P EST LOW 20 MIN: CPT | Mod: PBBFAC | Performed by: SURGERY

## 2018-07-23 PROCEDURE — 85651 RBC SED RATE NONAUTOMATED: CPT

## 2018-07-23 PROCEDURE — 99999 PR PBB SHADOW E&M-EST. PATIENT-LVL III: CPT | Mod: PBBFAC,,, | Performed by: SURGERY

## 2018-07-23 PROCEDURE — 85025 COMPLETE CBC W/AUTO DIFF WBC: CPT

## 2018-07-23 PROCEDURE — 80048 BASIC METABOLIC PNL TOTAL CA: CPT

## 2018-07-23 PROCEDURE — 86140 C-REACTIVE PROTEIN: CPT

## 2018-07-23 NOTE — PROGRESS NOTES
Subjective:       Patient ID: Lizz Noguera is a 58 y.o. female.    Diverticulitis requiring CT drainage    Chief Complaint: Hospital Follow Up    She was admitted to the hospital with uncomplicated diverticulitis by the medicine service.  A repeat CT scan Showed abscess formation.  Surgery was called and she underwent CT-guided drainage followed by repeat aspiration.    Patient follows up now.    She is discharged on Augmentin and Levaquin.  She still has her PICC line in place.    While hospitalized he was also being treated for a pneumonia.    She denies any abdominal pain. She does not have any significant shortness of breath.    She reports that she has stop smoking cigarettes.    She has never had a colonoscopy      Review of Systems   Constitutional: Negative for appetite change, chills, fatigue, fever and unexpected weight change.   HENT: Negative for hearing loss and rhinorrhea.    Eyes: Negative for visual disturbance.   Respiratory: Negative for apnea, cough, shortness of breath and wheezing.    Cardiovascular: Negative for chest pain and palpitations.   Gastrointestinal: Negative for abdominal distention, abdominal pain, blood in stool, constipation, diarrhea, nausea and vomiting.        Mild discomfort from the drain   Genitourinary: Negative for dysuria, frequency and urgency.   Musculoskeletal: Negative for arthralgias and neck pain.   Skin: Negative for rash.   Neurological: Negative for seizures, weakness, numbness and headaches.   Hematological: Negative for adenopathy. Does not bruise/bleed easily.   Psychiatric/Behavioral: Negative for hallucinations. The patient is not nervous/anxious.        Objective:      Physical Exam   Constitutional: She appears well-nourished.   Cardiovascular: Normal rate, regular rhythm and normal heart sounds.    Abdominal: Soft.   There are 2 drains in place. They flushed freely.  There is essentially minimal clear affluent   Musculoskeletal:   Right upper arm PICC  line   Skin: Skin is warm and dry.   Vitals reviewed.      Assessment:      diverticulitis with abscess treated by CT-guided drainage  Pneumonia  HIV  In need of colon cancer screening  Plan:       Repeat CT scan with oral and IV contrast.  Follow-up Thursday if no significant fluid collections drain removal.  Continue antibiotics till completed.  Remove the PICC line after the CT scan as it is not being used and can be used for IV contrast.    Patient will need to follow up with her primary care doctor/HIV physician.    At subsequent visits we will discuss the need for colonoscopy and surgical or dietary treatment for diverticulitis

## 2018-07-23 NOTE — PATIENT INSTRUCTIONS
We will get a CT scan of your abdomen and pelvis to make sure the abscesses of gone away and have you follow-up with us on Thursday to remove the drains.    With regard to the diverticulitis you probably need a colonoscopy in 6-8 weeks and then at that point we can discuss whether we should do surgery to prevent this from happening again are moderate to you with medical treatment with his essentially a high-fiber diet.    We will further discuss this in the upcoming visits

## 2018-07-24 ENCOUNTER — HOSPITAL ENCOUNTER (OUTPATIENT)
Dept: RADIOLOGY | Facility: HOSPITAL | Age: 58
Discharge: HOME | End: 2018-07-24
Attending: PHYSICIAN ASSISTANT
Payer: MEDICARE

## 2018-07-24 DIAGNOSIS — K57.32 DIVERTICULITIS LARGE INTESTINE W/O PERFORATION OR ABSCESS W/O BLEEDING: ICD-10-CM

## 2018-07-24 PROCEDURE — 74177 CT ABD & PELVIS W/CONTRAST: CPT | Mod: TC,PO

## 2018-07-24 PROCEDURE — 25500020 PHARM REV CODE 255: Mod: PO | Performed by: PHYSICIAN ASSISTANT

## 2018-07-24 PROCEDURE — 74177 CT ABD & PELVIS W/CONTRAST: CPT | Mod: 26,,, | Performed by: RADIOLOGY

## 2018-07-24 RX ADMIN — IOHEXOL 75 ML: 350 INJECTION, SOLUTION INTRAVENOUS at 11:07

## 2018-07-24 RX ADMIN — IOHEXOL 30 ML: 350 INJECTION, SOLUTION INTRAVENOUS at 09:07

## 2018-07-24 NOTE — PHYSICIAN QUERY
PT Name: Lizz Noguera  MR #: [id number]    Physician Query Form -Systemic Infectious Process Clarification     CDS/: Kandace JONES, RN, CCDS               Contact information: [email or web address]  This form is a permanent document in the medical record.     Query Date: July 24, 2018     By submitting this query, we are merely seeking further clarification of documentation. Please utilize your independent clinical judgment when addressing the question(s) below.    The Medical record contains the following:     Indicators   Supporting Clinical Findings   Location in Medical Record    x HR RR BP Temp     RR 20   BP 95/58   Temp 99.6--> 103.7    ED Vital signs 7/7/18 @ 0922     ED Vital signs @ 0922-->1348    x Lactic Acid             Procalcitonin  Problems Resolved During this Admission:     Lactic acidosis      Discharge Summary 7/17/18    WBC   Bands      CRP      x Culture(s)  Gram negative rods     BACTEROIDES VULGATUS  Blood Culture 7/7/18    AMS, Confusion, LOC, etc.      Organ Dysfunction / Failure      x Bacteremia or Sepsis / Septic  Gram-negative bacteremia    Discharge Summary 7/17/18    x Known or Suspected Source of Infection documented  PRINCIPAL PROBLEM:  Diverticulitis of large intestine with abscess without bleeding     Pneumonia due to infectious organism   Discharge Summary 7/17/18    (Failed) Outpatient Treatment      Medication      Treatment      Other       Provider, please specify diagnosis or diagnoses associated with above clinical findings.    [ x ] Sepsis    [  ] Other Infectious Disease (please specify): _________________________________    [  ] Other: __________________________________    [  ] Clinically Undetermined    Please document in your progress notes daily for the duration of treatment until resolved and include in your discharge summary.

## 2018-07-26 ENCOUNTER — OFFICE VISIT (OUTPATIENT)
Dept: SURGERY | Facility: CLINIC | Age: 58
End: 2018-07-26
Payer: MEDICARE

## 2018-07-26 ENCOUNTER — TELEPHONE (OUTPATIENT)
Dept: ADMINISTRATIVE | Facility: CLINIC | Age: 58
End: 2018-07-26

## 2018-07-26 VITALS
BODY MASS INDEX: 20.64 KG/M2 | WEIGHT: 123.88 LBS | SYSTOLIC BLOOD PRESSURE: 113 MMHG | HEART RATE: 88 BPM | HEIGHT: 65 IN | DIASTOLIC BLOOD PRESSURE: 68 MMHG

## 2018-07-26 DIAGNOSIS — K57.20 DIVERTICULITIS OF LARGE INTESTINE WITH ABSCESS WITHOUT BLEEDING: Primary | ICD-10-CM

## 2018-07-26 PROBLEM — E87.6 HYPOKALEMIA: Status: RESOLVED | Noted: 2018-07-10 | Resolved: 2018-07-26

## 2018-07-26 PROBLEM — R50.9 FEVER: Status: RESOLVED | Noted: 2018-07-13 | Resolved: 2018-07-26

## 2018-07-26 PROCEDURE — 99213 OFFICE O/P EST LOW 20 MIN: CPT | Mod: PBBFAC,PO | Performed by: SURGERY

## 2018-07-26 PROCEDURE — 99999 PR PBB SHADOW E&M-EST. PATIENT-LVL III: CPT | Mod: PBBFAC,,, | Performed by: SURGERY

## 2018-07-26 PROCEDURE — 99212 OFFICE O/P EST SF 10 MIN: CPT | Mod: S$PBB,,, | Performed by: SURGERY

## 2018-07-26 NOTE — PATIENT INSTRUCTIONS
You can remove the Band-Aids from where the drain sites were tomorrow.  Please cover these areas with antibiotic ointment and a Band-Aid for 5 days and then stop.    Once you have completed your antibiotics he will need to have the PICC line removed from her right arm.    We will see you back in 2 weeks.    Please restrain from smoking.    Please call for any increasing abdominal pain, fever or nausea and vomiting

## 2018-07-26 NOTE — PROGRESS NOTES
Subjective:       Patient ID: Lizz Noguera is a 58 y.o. female.    Diverticulitis with abscess    Chief Complaint: Results    Patient presented the hospital with uncomplicated diverticulitis.  Her condition deteriorated.  CT scan was done that showed several abscesses.  She underwent CT-guided drainage. She underwent an aspiration another smaller fluid collection.  She is currently on antibiotics as listed in her medications.    A repeat CT scan today showed there was near complete resolution with minimal residual fluid the area of the abscesses.  She denies any abdominal pain or fever      Review of Systems   Constitutional: Negative for chills and fever.   HENT: Negative.    Respiratory: Negative.    Cardiovascular: Negative.    Gastrointestinal: Negative.         There are 2 pigtail drains.  These were removed without difficulty with the drains intact       Objective:      Physical Exam   Constitutional: No distress.   Thin, smells of tobacco   HENT:   Head: Normocephalic and atraumatic.   Neck: Neck supple.   Cardiovascular: Normal rate.    Pulmonary/Chest: Effort normal and breath sounds normal.   Abdominal: Soft. Bowel sounds are normal. She exhibits no distension. There is no tenderness.       Two drains in place which were removed.  The drains were intact   Musculoskeletal:   PICC line right arm with no arm swelling   Skin: Skin is warm and dry.   Vitals reviewed.      CT scan reviewed    Reading Physician Reading Date Result Priority   Ralph Carr MD 7/24/2018    Narrative     EXAMINATION:  CT ABDOMEN PELVIS WITH CONTRAST    CLINICAL HISTORY:  known diverticular abscess s/p IR drains; Diverticulitis of large intestine without perforation or abscess without bleeding    TECHNIQUE:  Low dose axial images, sagittal and coronal reformations were obtained from the lung bases to the pubic symphysis following the IV administration of 75 mL of Omnipaque 350 and the oral administration of 30 mL of Omnipaque  350.    COMPARISON:  Exam is compared to CT from 11 days prior    FINDINGS:  Limited imaging through the inferior thorax demonstrates resolution of previously seen bilateral pulmonary infiltrates and pleural effusions.  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  No destructive osseous lesion is evident.    No focal hepatic lesion.  Main portal vein is patent.  There is cholelithiasis.  The spleen and pancreas demonstrate a normal appearance.  Adrenal glands are unremarkable.  Kidneys enhance symmetrically.  There is no hydronephrosis.    Aortic atherosclerosis is noted.  IVC is within normal limits.    Stomach and small bowel are normal without evidence of bowel obstruction.  No evidence to suggest appendicitis.  There is colonic diverticulosis with mild wall thickening which has improved since 07/13/2018.  No free intra-abdominal air.    There is a tiny abscess seen to the left of midline within the anterior lower abdomen.  For example see image 56 of series 2.  This currently measures 1 cm transversely by 0.9 cm in AP dimension.  It is remeasured by me on the previous exam at approximately 2.3 x 2.4 cm.  Again seen is a pigtail drainage catheter within the anterior abdomen with interval decrease in size of the abscess which was present on the previous exam.  An additional pigtail drainage catheter is seen in the rectouterine pouch to the right of midline with marked improvement in the previously seen abscess in this region.  No new abscess is identified.  There is now only mild residual fat stranding and inflammatory changes in this region.  There is also interval improvement in previously present ileus.      Impression       Redemonstration of 2 pigtail drainage catheters with near complete resolution of the abscesses.  An additional tiny abscess within the left lower quadrant has decreased in size as described above.  No new drainable fluid collections are seen.  Interval resolution of  bilateral lower lobe infiltrates and pleural effusions.  Other stable findings as described above      Electronically signed by: Ralph Carr MD  Date: 07/24/2018       Assessment:      diverticulitis with abscesses.  Abscesses have resolved with CT-guided drainage.  The drains were removed today  Plan:       Continue antibiotics until completed   .  Resolution of the 2 Abscesses  with CT-guided drainage  Follow up with surgery in 2 weeks.

## 2018-07-26 NOTE — PROGRESS NOTES
Home Health with [facility] ([place]) - Dr. David Barnes. Patient received SN, PT, OT services.

## 2018-07-27 NOTE — PHYSICIAN QUERY
PT Name: Lizz Noguera  MR #: [id number]    Physician Query Form - Cause and Effect Relationship Clarification      CDS/: Kandace JONES, RN, CCDS              Contact information: [email or web address]    This form is a permanent document in the medical record.     Query Date: July 27, 2018    By submitting this query, we are merely seeking further clarification of documentation. Please utilize your independent clinical judgment when addressing the question(s) below.    The Medical record contains the following:  Supporting Clinical Findings   Location in record      [x] Sepsis                                                                                    Physician Query filed 7/26/18                                                                     Gram-negative bacteremia                                                                                          Gram negative rods   BACTEROIDES VULGATUS                                       Discharge Summary 7/17/18     Blood Culture 7/7/18         Provider, please clarify if there is any correlation between _____Sepsis____ and ___Gram negative rods               BACTEROIDES VULGATUS ___.           Are the conditions:     [x  ] Due to or associated with each other     [  ] Unrelated to each other     [  ] Other (Please Specify): _________________________     [  ] Clinically Undetermined

## 2018-07-30 ENCOUNTER — LAB VISIT (OUTPATIENT)
Dept: LAB | Facility: HOSPITAL | Age: 58
End: 2018-07-30
Attending: SURGERY
Payer: MEDICARE

## 2018-07-30 DIAGNOSIS — R78.81 BACTEREMIA: Primary | ICD-10-CM

## 2018-07-30 LAB
ANION GAP SERPL CALC-SCNC: 12 MMOL/L
BASOPHILS # BLD AUTO: 0.01 K/UL
BASOPHILS NFR BLD: 0.2 %
BUN SERPL-MCNC: 6 MG/DL
CALCIUM SERPL-MCNC: 9.9 MG/DL
CHLORIDE SERPL-SCNC: 101 MMOL/L
CO2 SERPL-SCNC: 21 MMOL/L
CREAT SERPL-MCNC: 0.9 MG/DL
CRP SERPL-MCNC: 37.4 MG/L
DIFFERENTIAL METHOD: ABNORMAL
EOSINOPHIL # BLD AUTO: 0.2 K/UL
EOSINOPHIL NFR BLD: 3.4 %
ERYTHROCYTE [DISTWIDTH] IN BLOOD BY AUTOMATED COUNT: 14.7 %
ERYTHROCYTE [SEDIMENTATION RATE] IN BLOOD BY WESTERGREN METHOD: 121 MM/HR
EST. GFR  (AFRICAN AMERICAN): >60 ML/MIN/1.73 M^2
EST. GFR  (NON AFRICAN AMERICAN): >60 ML/MIN/1.73 M^2
GLUCOSE SERPL-MCNC: 129 MG/DL
HCT VFR BLD AUTO: 33.4 %
HGB BLD-MCNC: 10.8 G/DL
LYMPHOCYTES # BLD AUTO: 2.2 K/UL
LYMPHOCYTES NFR BLD: 34.3 %
MCH RBC QN AUTO: 30.5 PG
MCHC RBC AUTO-ENTMCNC: 32.3 G/DL
MCV RBC AUTO: 94 FL
MONOCYTES # BLD AUTO: 0.8 K/UL
MONOCYTES NFR BLD: 11.8 %
NEUTROPHILS # BLD AUTO: 3.2 K/UL
NEUTROPHILS NFR BLD: 50.3 %
PLATELET # BLD AUTO: 290 K/UL
PMV BLD AUTO: 10.1 FL
POTASSIUM SERPL-SCNC: 4.3 MMOL/L
RBC # BLD AUTO: 3.54 M/UL
SODIUM SERPL-SCNC: 134 MMOL/L
WBC # BLD AUTO: 6.38 K/UL

## 2018-07-30 PROCEDURE — 86140 C-REACTIVE PROTEIN: CPT

## 2018-07-30 PROCEDURE — 85651 RBC SED RATE NONAUTOMATED: CPT

## 2018-07-30 PROCEDURE — 85025 COMPLETE CBC W/AUTO DIFF WBC: CPT

## 2018-07-30 PROCEDURE — 80048 BASIC METABOLIC PNL TOTAL CA: CPT

## 2018-08-09 ENCOUNTER — OFFICE VISIT (OUTPATIENT)
Dept: SURGERY | Facility: CLINIC | Age: 58
End: 2018-08-09
Payer: MEDICARE

## 2018-08-09 ENCOUNTER — LAB VISIT (OUTPATIENT)
Dept: LAB | Facility: HOSPITAL | Age: 58
End: 2018-08-09
Attending: SURGERY
Payer: MEDICARE

## 2018-08-09 VITALS
BODY MASS INDEX: 20.47 KG/M2 | HEART RATE: 113 BPM | TEMPERATURE: 99 F | SYSTOLIC BLOOD PRESSURE: 117 MMHG | DIASTOLIC BLOOD PRESSURE: 64 MMHG | WEIGHT: 123 LBS

## 2018-08-09 DIAGNOSIS — R30.9 PAIN PASSING URINE: ICD-10-CM

## 2018-08-09 DIAGNOSIS — K57.80 DIVERTICULAR DISEASE OF INTESTINE WITH PERFORATION AND ABSCESS: ICD-10-CM

## 2018-08-09 DIAGNOSIS — R30.0 DYSURIA: Primary | ICD-10-CM

## 2018-08-09 DIAGNOSIS — Z12.11 COLON CANCER SCREENING: ICD-10-CM

## 2018-08-09 DIAGNOSIS — R30.0 DYSURIA: ICD-10-CM

## 2018-08-09 PROBLEM — Z21 HIV (HUMAN IMMUNODEFICIENCY VIRUS INFECTION): Status: RESOLVED | Noted: 2018-07-07 | Resolved: 2018-08-09

## 2018-08-09 PROBLEM — R78.81 GRAM-NEGATIVE BACTEREMIA: Status: RESOLVED | Noted: 2018-07-10 | Resolved: 2018-08-09

## 2018-08-09 PROBLEM — K57.20 DIVERTICULITIS OF LARGE INTESTINE WITH ABSCESS WITHOUT BLEEDING: Status: RESOLVED | Noted: 2018-07-07 | Resolved: 2018-08-09

## 2018-08-09 PROBLEM — B20 HIV (HUMAN IMMUNODEFICIENCY VIRUS INFECTION): Status: RESOLVED | Noted: 2018-07-07 | Resolved: 2018-08-09

## 2018-08-09 PROBLEM — J18.9 PNEUMONIA DUE TO INFECTIOUS ORGANISM: Status: RESOLVED | Noted: 2018-07-11 | Resolved: 2018-08-09

## 2018-08-09 LAB
BACTERIA #/AREA URNS HPF: ABNORMAL /HPF
BILIRUB UR QL STRIP: ABNORMAL
BILIRUB UR QL STRIP: ABNORMAL
CAOX CRY URNS QL MICRO: ABNORMAL
CLARITY UR: ABNORMAL
CLARITY UR: ABNORMAL
COLOR UR: ABNORMAL
COLOR UR: ABNORMAL
GLUCOSE UR QL STRIP: ABNORMAL
GLUCOSE UR QL STRIP: ABNORMAL
HGB UR QL STRIP: ABNORMAL
HGB UR QL STRIP: ABNORMAL
HYALINE CASTS #/AREA URNS LPF: 2 /LPF
KETONES UR QL STRIP: ABNORMAL
KETONES UR QL STRIP: ABNORMAL
LEUKOCYTE ESTERASE UR QL STRIP: ABNORMAL
LEUKOCYTE ESTERASE UR QL STRIP: ABNORMAL
MICROSCOPIC COMMENT: ABNORMAL
NITRITE UR QL STRIP: ABNORMAL
NITRITE UR QL STRIP: ABNORMAL
PH UR STRIP: ABNORMAL [PH] (ref 5–8)
PH UR STRIP: ABNORMAL [PH] (ref 5–8)
PROT UR QL STRIP: ABNORMAL
PROT UR QL STRIP: ABNORMAL
RBC #/AREA URNS HPF: 50 /HPF (ref 0–4)
SP GR UR STRIP: ABNORMAL (ref 1–1.03)
SP GR UR STRIP: ABNORMAL (ref 1–1.03)
SQUAMOUS #/AREA URNS HPF: 25 /HPF
URN SPEC COLLECT METH UR: ABNORMAL
URN SPEC COLLECT METH UR: ABNORMAL
WBC #/AREA URNS HPF: 10 /HPF (ref 0–5)

## 2018-08-09 PROCEDURE — 99213 OFFICE O/P EST LOW 20 MIN: CPT | Mod: S$PBB,,, | Performed by: SURGERY

## 2018-08-09 PROCEDURE — 81000 URINALYSIS NONAUTO W/SCOPE: CPT | Mod: PO

## 2018-08-09 PROCEDURE — 99999 PR PBB SHADOW E&M-EST. PATIENT-LVL III: CPT | Mod: PBBFAC,,, | Performed by: SURGERY

## 2018-08-09 PROCEDURE — 99213 OFFICE O/P EST LOW 20 MIN: CPT | Mod: PBBFAC,PO | Performed by: SURGERY

## 2018-08-09 NOTE — PATIENT INSTRUCTIONS
We will check a urinalysis and if there are signs of infection I will sent antibiotics to your pharmacy.      We will ask you to see the gastroenterologist to discuss colon cancer screening    With regards to diverticulitis we have 2 options.  The 1st is to just watch you and see if you developed diverticulitis again in the future.  You at a small increased risk for this to happen again compared to the average person.    The other option is to do elective surgery and removed you're sigmoid colon and put things back together.  The risks of that include infection, bleeding, injury to the ureter, anastomotic stricture or leak..  We will see you back after the see the gastroenterologist to further discuss this

## 2018-08-09 NOTE — PROGRESS NOTES
Patient ID: Lizz Noguera is a 58 y.o. female.    Diverticulitis with perforation and abscess    Chief Complaint: Post-op Evaluation      HPI:  Patient presented the hospital with what appeared to be diverticulitis limited to the colon.  For condition slightly worsened.  Abscesses were noted.  She underwent CT-guided drainage.    Patient presents now with the only complaint of dysuria.  She states that she has a burning sensation with urination.  She denies any pneumoturia        PATIENT STATES THAT SHE SAW HER PRIMARY CARE DOCTOR TODAY AND THAT HE TREATED HER URINARY TRACT INFECTION.  SHE ALSO STATED THAT SHE HAD A COLONOSCOPY A YEAR AGO BUT SHE DOES NOT KNOW WITH THE RESULTS ARE     Review of Systems   Constitutional: Negative.    HENT: Negative.    Eyes: Negative.    Respiratory: Negative.    Gastrointestinal: Negative.    Endocrine: Negative.    Genitourinary: Positive for dysuria.   Musculoskeletal: Negative.    Neurological: Negative.    Hematological: Negative.    Psychiatric/Behavioral: Negative.        Current Outpatient Prescriptions   Medication Sig Dispense Refill    buPROPion (WELLBUTRIN SR) 150 MG TBSR 12 hr tablet Take 150 mg by mouth 2 (two) times daily.      cyproheptadine (PERIACTIN) 4 mg tablet Take 4 mg by mouth 3 (three) times daily.      darunavir-cobicistat 800-150 mg-mg Tab Take 1 tablet by mouth once daily.      emtricitabine-tenofovir 200-300 mg (TRUVADA) 200-300 mg Tab Take 1 tablet by mouth once daily.      enalapril (VASOTEC) 20 MG tablet Take 20 mg by mouth.      ferrous gluconate (FERGON) 324 MG tablet Take 324 mg by mouth once daily.      HYDROcodone-acetaminophen (NORCO) 7.5-325 mg per tablet Take 1 tablet by mouth every 6 (six) hours as needed for Pain. 12 tablet 0    multivitamin (THERAGRAN) per tablet Take by mouth.      rosuvastatin (CRESTOR) 20 MG tablet Take 20 mg by mouth once daily.       No current facility-administered medications for this visit.        Review of  patient's allergies indicates:   Allergen Reactions    Bactrim [sulfamethoxazole-trimethoprim] Rash       Past Medical History:   Diagnosis Date    Anemia     Cholelithiasis     CVA (cerebral vascular accident)     Diverticulitis     Glaucoma     HIV disease     HLD (hyperlipidemia)     HTN (hypertension)        Past Surgical History:   Procedure Laterality Date    NONE         Family History   Problem Relation Age of Onset    Stroke Mother        Social History     Social History    Marital status: Legally      Spouse name: N/A    Number of children: N/A    Years of education: N/A     Occupational History    Not on file.     Social History Main Topics    Smoking status: Current Every Day Smoker     Packs/day: 1.00     Years: 46.00     Types: Cigarettes    Smokeless tobacco: Never Used    Alcohol use No    Drug use: No    Sexual activity: Yes     Partners: Male     Other Topics Concern    Not on file     Social History Narrative    Lives with : Scar Michael, surrogate decision maker, HOME [phone]       Vitals:    08/09/18 0921   BP: 117/64   Pulse: (!) 113   Temp: 98.6 °F (37 °C)       Physical Exam   Constitutional: She is oriented to person, place, and time. She appears well-nourished. No distress.   Neck: Neck supple.   Cardiovascular: Normal rate, regular rhythm and normal heart sounds.    Pulmonary/Chest: Effort normal and breath sounds normal.   Abdominal: Soft. Bowel sounds are normal. She exhibits no distension. There is no tenderness.   The drain sites have healed well   Neurological: She is alert and oriented to person, place, and time.   Skin: Skin is warm. Capillary refill takes less than 2 seconds.   Psychiatric: She has a normal mood and affect. Her behavior is normal. Judgment and thought content normal.   Vitals reviewed.      Assessment & Plan:    diverticulitis with abscess   The patient is post CT guided drainage with resolution.  At this point she  has the option of observation for additional episodes of diverticulitis or sigmoid resection.  This was discussed with her.  She at this point is unsure of how she likes to proceed    Dysuria   Check a urinalysis    Colon cancer screening   GI consultation to discuss the need for colonoscopy    Should the patient desire surgery for diverticulitis we would need to obtain information from her HIV doctor regarding the status of her CD4 count.  She would also need to be tobacco free    URINALYSIS SHOWED SOME WHITE CELLS AND BACTERIA.  PATIENT SAW HER PRIMARY CARE DOCTOR AND THEY PLAN ON TREATING HER URINARY TRACT INFECTION

## 2018-08-22 ENCOUNTER — INITIAL CONSULT (OUTPATIENT)
Dept: GASTROENTEROLOGY | Facility: CLINIC | Age: 58
End: 2018-08-22
Payer: MEDICARE

## 2018-08-22 VITALS
HEART RATE: 60 BPM | HEIGHT: 65 IN | WEIGHT: 118.19 LBS | DIASTOLIC BLOOD PRESSURE: 70 MMHG | SYSTOLIC BLOOD PRESSURE: 110 MMHG | BODY MASS INDEX: 19.69 KG/M2

## 2018-08-22 DIAGNOSIS — R68.81 EARLY SATIETY: ICD-10-CM

## 2018-08-22 DIAGNOSIS — R11.2 NAUSEA AND VOMITING, INTRACTABILITY OF VOMITING NOT SPECIFIED, UNSPECIFIED VOMITING TYPE: Primary | ICD-10-CM

## 2018-08-22 DIAGNOSIS — B20 HIV (HUMAN IMMUNODEFICIENCY VIRUS INFECTION): ICD-10-CM

## 2018-08-22 DIAGNOSIS — K57.92 DIVERTICULITIS: ICD-10-CM

## 2018-08-22 DIAGNOSIS — K62.5 BRBPR (BRIGHT RED BLOOD PER RECTUM): ICD-10-CM

## 2018-08-22 PROCEDURE — 99999 PR PBB SHADOW E&M-EST. PATIENT-LVL III: CPT | Mod: PBBFAC,,, | Performed by: NURSE PRACTITIONER

## 2018-08-22 PROCEDURE — 99204 OFFICE O/P NEW MOD 45 MIN: CPT | Mod: S$PBB,,, | Performed by: NURSE PRACTITIONER

## 2018-08-22 PROCEDURE — 99213 OFFICE O/P EST LOW 20 MIN: CPT | Mod: PBBFAC,PO | Performed by: NURSE PRACTITIONER

## 2018-08-22 NOTE — LETTER
August 23, 2018      Qasim Gleason MD  [address] [place] [address]           [facility]  [address] [place] [address]  Phone: [phone]  Fax: [phone]          Patient: Lizz Noguera   MR Number: [id number]   YOB: 1960   Date of Visit: 8/22/2018       Dear Dr. Qasim Gleason:    Thank you for referring Lizz Noguera to me for evaluation. Attached you will find relevant portions of my assessment and plan of care.    If you have questions, please do not hesitate to call me. I look forward to following Lizz Noguera along with you.    Sincerely,    Ann Marei Flores, [facility]    Enclosure  CC:  No Recipients    If you would like to receive this communication electronically, please contact [email or web address] or [phone] to request more information on Listen Edition Link access.    For providers and/or their staff who would like to refer a patient to [facility], please contact us through our one-stop-shop provider referral line, [facility], at [phone].    If you feel you have received this communication in error or would no longer like to receive these types of communications, please e-mail [email or web address]

## 2018-08-23 NOTE — PROGRESS NOTES
Clinic Consult:  [facility] Consultation Note    Reason for Consult:  The primary encounter diagnosis was Nausea and vomiting, intractability of vomiting not specified, unspecified vomiting type. Diagnoses of Early satiety, Diverticulitis, BRBPR (bright red blood per rectum), and HIV (human immunodeficiency virus infection) were also pertinent to this visit.    PCP: Joseluis Doe (Inactive)   [address] / [place] [address]    HPI:  This is a 58 y.o. female here for evaluation of the above  Pt was referred by General surgery for further discussion of recent diverticulitis  Pt states that she was recently discharged from the hospital.  Prior to her admission, she has was severe lower abdominal cramping with fever and constipation.  Ct scan showed diverticulitis of the sigmoid without abscess.  She was started on ABX therapy.  She began having worsening abdominal pain and distension.  A repeat CT was completed and showed anterior pelvic abscess measuring 8.5 cm with several foci of free air adjacent to the sigmoid colon in the mesentery and liver likely from perforated diverticulum.  Gen Surg was consulted and CT guided draining of the abscess was completed by IR.  2 JOSE drains were placed. Pt was discharged and has been followed by Gen surg.    Drains were removed.  Today, pt states that she has been feeling well without any continued abdominal pain.  No constipation.  No fever.  She has had some intermittent BRBPR when wiping.  No hematochezia.  Reports a hx of hemorrhoids.   She does not recall any previous colonoscopy.   She admits that she has had some recent worsening of nausea and vomiting with early satiety.  She denies any known exacerbating factors.  These symptoms were present prior to her hospitalization, worse after discharge.   No previous EGD  PNH includes HIV which is managed by [facility]    Medical History:   Past Medical History:   Diagnosis Date    Anemia      "Cholelithiasis     CVA (cerebral vascular accident)     Diverticulitis     Glaucoma     HIV disease     HLD (hyperlipidemia)     HTN (hypertension)        Surgical History:  Past Surgical History:   Procedure Laterality Date    NONE         Family History:   Family History   Problem Relation Age of Onset    Stroke Mother        Social History:   Social History     Tobacco Use    Smoking status: Current Every Day Smoker     Packs/day: 1.00     Years: 46.00     Pack years: 46.00     Types: Cigarettes    Smokeless tobacco: Never Used   Substance Use Topics    Alcohol use: No    Drug use: No       Allergies: Reviewed    Home Medications:   Current Outpatient Medications on File Prior to Visit   Medication Sig Dispense Refill    buPROPion (WELLBUTRIN SR) 150 MG TBSR 12 hr tablet Take 150 mg by mouth 2 (two) times daily.      cyproheptadine (PERIACTIN) 4 mg tablet Take 4 mg by mouth 3 (three) times daily.      darunavir-cobicistat 800-150 mg-mg Tab Take 1 tablet by mouth once daily.      emtricitabine-tenofovir 200-300 mg (TRUVADA) 200-300 mg Tab Take 1 tablet by mouth once daily.      ferrous gluconate (FERGON) 324 MG tablet Take 324 mg by mouth once daily.      multivitamin (THERAGRAN) per tablet Take by mouth.      rosuvastatin (CRESTOR) 20 MG tablet Take 20 mg by mouth once daily.      enalapril (VASOTEC) 20 MG tablet Take 20 mg by mouth.      HYDROcodone-acetaminophen (NORCO) 7.5-325 mg per tablet Take 1 tablet by mouth every 6 (six) hours as needed for Pain. 12 tablet 0     No current facility-administered medications on file prior to visit.        Physical Exam:  Vital Signs:  /70   Pulse 60   Ht 5' 5" (1.651 m)   Wt 53.6 kg (118 lb 2.7 oz)   BMI 19.66 kg/m²   Body mass index is 19.66 kg/m².  Physical Exam    Labs: Pertinent labs reviewed.    Endoscopy:      CRC Screening:     Assessment:  1. Nausea and vomiting, intractability of vomiting not specified, unspecified vomiting type  "   2. Early satiety    3. Diverticulitis    4. BRBPR (bright red blood per rectum)    5. HIV (human immunodeficiency virus infection)         Recommendations:  - Discussed diagnosis and prognosis of diverticulitis.  No current symptoms  - Will plan for colonoscopy with miralax prep  - WIll also plan for EGD given her N/V and early satiety  - Continue F/U with Gen Surg and ID as previous planned  Nausea and vomiting, intractability of vomiting not specified, unspecified vomiting type  -     Case request GI: ESOPHAGOGASTRODUODENOSCOPY (EGD), COLONOSCOPY    Early satiety  -     Case request GI: ESOPHAGOGASTRODUODENOSCOPY (EGD), COLONOSCOPY    Diverticulitis  -     Case request GI: ESOPHAGOGASTRODUODENOSCOPY (EGD), COLONOSCOPY    BRBPR (bright red blood per rectum)  -     Case request GI: ESOPHAGOGASTRODUODENOSCOPY (EGD), COLONOSCOPY    HIV (human immunodeficiency virus infection)        Follow up to be determined by results of procedure.        Thank you so much for allowing me to participate in the care of KATHYA Cano

## 2018-10-04 ENCOUNTER — DOCUMENTATION ONLY (OUTPATIENT)
Dept: ENDOSCOPY | Facility: HOSPITAL | Age: 58
End: 2018-10-04

## 2018-10-04 NOTE — PROGRESS NOTES
Spoke with pt and advised provider will not be available on 10/16/18. Pt stated she needs to be rescheduled on a Thursday. At this time there aren't any openings jamie Thursday for a provider appropriate to case orders. I advised pt our office will call back to reschedule appt once the schedule has been updated. Pt stated understanding. Appt canceled.

## 2018-10-08 ENCOUNTER — TELEPHONE (OUTPATIENT)
Dept: ENDOSCOPY | Facility: HOSPITAL | Age: 58
End: 2018-10-08

## 2018-10-08 NOTE — TELEPHONE ENCOUNTER
----- Message from Luisana Jansen LPN sent at 10/4/2018  9:06 AM CDT -----  Regarding: Thursday appt  Schedule pt on a Thursday with gi provider.

## 2018-10-11 ENCOUNTER — OFFICE VISIT (OUTPATIENT)
Dept: SURGERY | Facility: CLINIC | Age: 58
End: 2018-10-11
Payer: MEDICARE

## 2018-10-11 VITALS
HEART RATE: 75 BPM | SYSTOLIC BLOOD PRESSURE: 101 MMHG | DIASTOLIC BLOOD PRESSURE: 55 MMHG | WEIGHT: 123.69 LBS | BODY MASS INDEX: 20.58 KG/M2 | TEMPERATURE: 98 F

## 2018-10-11 DIAGNOSIS — B20 HIV (HUMAN IMMUNODEFICIENCY VIRUS INFECTION): ICD-10-CM

## 2018-10-11 DIAGNOSIS — K57.20 DIVERTICULITIS OF LARGE INTESTINE WITH ABSCESS WITHOUT BLEEDING: Primary | ICD-10-CM

## 2018-10-11 DIAGNOSIS — Z72.0 TOBACCO ABUSE: ICD-10-CM

## 2018-10-11 PROCEDURE — 99213 OFFICE O/P EST LOW 20 MIN: CPT | Mod: PBBFAC,PO | Performed by: SURGERY

## 2018-10-11 PROCEDURE — 99999 PR PBB SHADOW E&M-EST. PATIENT-LVL III: CPT | Mod: PBBFAC,,, | Performed by: SURGERY

## 2018-10-11 PROCEDURE — 99213 OFFICE O/P EST LOW 20 MIN: CPT | Mod: S$PBB,,, | Performed by: SURGERY

## 2018-10-11 NOTE — PROGRESS NOTES
Subjective:       Patient ID: Lizz Noguera is a 58 y.o. female.    Diverticulitis with abscess    Chief Complaint: Post-op Evaluation    Patient had an episode of diverticulitis with abscess.  This was treated with antibiotics and percutaneous drainage.  The patient is now pain free.  She has been evaluated by Gastroenterology for an upper and lower endoscopy but these procedures have not yet been performed.    The patient continues to smoke.  She states she is seeing her primary care doctor and is supposed to  her prescription for this.      Review of Systems   Constitutional: Negative.    HENT: Negative.    Respiratory: Positive for shortness of breath (With exertion).    Gastrointestinal: Negative.    Genitourinary: Negative.        Objective:      Physical Exam   Constitutional: She is oriented to person, place, and time. No distress.   Thin   Neck: Normal range of motion. Neck supple.   Cardiovascular: Normal rate, regular rhythm and normal heart sounds.   Pulmonary/Chest: Effort normal.   Slightly decreased airflow   Abdominal: Soft. Bowel sounds are normal. She exhibits no distension. There is no tenderness.   Musculoskeletal: She exhibits no edema.   Neurological: She is alert and oriented to person, place, and time.   Skin: Skin is warm and dry. Capillary refill takes less than 2 seconds.   Psychiatric: She has a normal mood and affect. Her behavior is normal. Judgment and thought content normal.   Vitals reviewed.        Assessment:      diverticulitis the abscess treated by CT-guided drainag  Tobacco abuse  HIV positive  Hypertension     Plan:  Colonoscopy an EGD is planned for October 16th.    With regards to her diverticulitis there are 2 options.    1.  Watchful waiting.  Should she have another attack and diverticulitis then surgery would be recommended, however before any surgery we need to know her CD4 count and she needs to stop smoking.    2.  Elective sigmoid resection with primary  anastomosis. This was discussed with the patient. The risks and benefits as well as the complications were reviewed.    Before any surgery the patient will need to be off tobacco for at least 6 weeks.    I have asked her to consider the above to options and follow up with us should she desire to proceed with surgery

## 2018-10-11 NOTE — PATIENT INSTRUCTIONS
Regarding the diverticulitis there are 2 options    One.  Watch you and see how you do.  If you have another episode of diverticulitis I would then recommend elective surgery once the infection has resolved    Two.  Remove her sigmoid colon to prevent this from happening again.  For this option you would need to stop smoking for at least 6 weeks and we need to know you're CD4 count    The before any of the above you need to undergo the colonoscopy and upper endoscopy is planned by the gastroenterologist, the if they find anything that is an indication for surgery they will send you back to a      Think about what you would want to do regarding the diverticulitis and follow up with us if you desire surgery.     Please follow-up with your primary care doctor regarding medications to help you stop smoking    You need to make sure your eating a high-fiber diet      Eating a High-Fiber Diet  Fiber is what gives strength and structure to plants. Most grains, beans, vegetables, and fruits contain fiber. Foods rich in fiber are often low in calories and fat, and they fill you up more. They may also reduce your risks for certain health problems. To find out the amount of fiber in canned, packaged, or frozen foods, read the Nutrition Facts label. It tells you how much fiber is in a serving.    Types of fiber and their benefits  There are two types of fiber: insoluble and soluble. They both aid digestion and help you maintain a healthy weight.  · Insoluble fiber. This is found in whole grains, cereals, certain fruits and vegetables such as apple skin, corn, and carrots. Insoluble fiber may prevent constipation and reduce the risk for certain types of cancer.  · Soluble fiber. This type of fiber is in oats, beans, and certain fruits and vegetables such as strawberries and peas. Soluble fiber can reduce cholesterol, which may help lower the risk for heart disease. It also helps control blood sugar levels.  Look for high-fiber  foods  Try these foods to add fiber to your diet:  · Whole-grain breads and cereals. Try to eat 6 to 8 ounces a day. Include wheat and oat bran cereals, whole-wheat muffins or toast, and corn tortillas in your meals.  · Fruits. Try to eat 2 cups a day. Apples, oranges, strawberries, pears, and bananas are good sources. (Note: Fruit juice is low in fiber.)  · Vegetables. Try to eat at least 2.5 cups a day. Add asparagus, carrots, broccoli, peas, and corn to your meals.  · Beans. One cup of cooked lentils gives you over 15 grams of fiber. Try navy beans, lentils, and chickpeas.  · Seeds. A small handful of seeds gives you about 3 grams of fiber. Try sunflower seeds.  Keep track of your fiber  Keep track of how much fiber you eat. Start by reading food labels. Then eat a variety of foods high in fiber. As you begin to eat more fiber, ask your healthcare provider how much water you should be drinking to keep your digestive system working smoothly.  You should aim for a certain amount of fiber in your diet each day. If you are a woman, that amount is between 25 and 28 grams per day. Men should aim for 30 to 33 grams per day. After age 50, your daily fiber needs drop to 22 grams for women and 28 grams for men.  Before you reach for the fiber supplements, think about this. Fiber is found naturally in healthy whole foods. It gives you that feeling of fullness after you eat. Taking fiber supplements or eating fiber-enriched foods will not give you this full feeling.  Your fiber intake is a good measure for the quality of your overall diet. If you are missing out on your daily amount of fiber, you may be lacking other important nutrients as well.  Date Last Reviewed: 5/11/2015  © 3911-3433 [employer]. [address] [place] [address], [place] [address]. All rights reserved. This information is not intended as a substitute for professional medical care. Always follow your healthcare professional's  instructions.        High-Fiber Diet  Fiber is in fruits, vegetables, cereals, and grains. Fiber passes through your body undigested. A high-fiber diet helps food move through your intestinal tract. The added bulk is helpful in preventing constipation. In people with diverticulosis, fiber helps clean out the pouches along the colon wall. It also prevents new pouches from forming. A high-fiber diet reduces the risk of colon cancer. It also lowers blood cholesterol and prevents high blood sugar in people with diabetes.    The fiber-rich foods listed below should be part of your diet. If you are not used to high-fiber foods, start with 1 or 2 foods from this list. Every 3 to 4 days add a new one to your diet. Do this until you are eating 4 high-fiber foods per day. This should give you 20 to 35 grams of fiber a day. It is also important to drink a lot of water when you are on this diet. You should have 6 to 8 glasses of water a day. Water makes the fiber swell and increases the benefit.  Foods high in dietary fiber  The following foods are high in dietary fiber:  · Breads. Breads made with 100% whole-wheat flour; ashlyn, wheat, or rye crackers; whole-grain tortillas, bran muffins.  · Cereals. Whole-grain and bran cereals with bran (shredded wheat, wheat flakes, raisin bran, corn bran); oatmeal, rolled oats, granola, and brown rice.  · Fruits. Fresh fruits and their edible skins (pears, prunes, raisins, berries, apples, and apricots); bananas, citrus fruit, mangoes, pineapple; and prune juice.  · Nuts. Any nuts and seeds.  · Vegetables. Best served raw or lightly cooked. All types, especially: green peas, celery, eggplant, potatoes, spinach, broccoli, [place] sprouts, winter squash, carrots, cauliflower, soybeans, lentils, and fresh and dried beans of all kinds.  · Other. Popcorn, any spices.  Date Last Reviewed: 8/1/2016  © 1968-1466 [employer]. [address] [place] [address], [place] [address]. All rights  reserved. This information is not intended as a substitute for professional medical care. Always follow your healthcare professional's instructions.        Discharge Instructions: Eating a High-Fiber Diet  Your health care provider has prescribed a high-fiber diet for you. Fiber is what gives strength and structure to plants. Most grains, beans, vegetables, and fruits contain fiber. Foods rich in fiber are often low in calories and fat, but they fill you up more. These foods may also reduce the risk of certain health problems.  There are two types of fiber:  · Insoluble fiber. This is found in whole grains, cereals, and certain fruits and vegetables (such as apple skins, corn, and beans). Insoluble fiber is made up mainly of plant cell walls. It may prevent constipation and reduce the risk of certain types of cancer.  · Soluble fiber. This type of fiber is found in oats, beans, nuts, and certain fruits and vegetables (such as strawberries and peas). Soluble fiber turns to gel in the digestive system, slowing the movement of the digestive tract. It helps control blood sugar levels and can reduce cholesterol, which may help lower the risk of heart disease. Soluble fiber can also help control appetite.     Home care  · Know how much fiber you need a day. The recommended daily amount of fiber is 25 grams for women and 38 grams for men. After age 50, daily fiber needs drop to 21 grams for women and 30 grams for men.  · Ask your doctor about a fiber supplement. (Always take fiber supplements with a large glass of water.)  · Keep track of how much fiber you eat.  · Eat a variety of foods high in fiber.  · Learn to read and understand food labels.  · Ask your healthcare provider how much water you should be drinking.  · Look for these high-fiber foods:  ¨ Whole-grain breads and cereals  § 6 ounces a day give you about 18 grams of fiber (1 ounce is equal to 1 slice of bread, 1 cup of dry cereal, or 1/2 cup of cooked  rice).  § Include wheat and oat bran cereals, whole-wheat muffins or toast, and corn tortillas in your meals.  ¨ Fruits   § 2 cups a day give you about 8 grams of fiber.  § Apples, oranges, strawberries, pears, and bananas are good sources.  § Fruit juice does not contain as much fiber as the fruit it was made from.  ¨ Vegetables  § 2½ cups a day give you about 11 grams of fiber. Add asparagus, carrots, broccoli, peas, and corn to your meals.  ¨ Legumes  § 1/4 cup a day (in place of meat) gives you about 4 grams of fiber. Try navy beans, lentils, chickpeas, and soybeans.  ¨ Seeds   § A small handful of seeds gives you about 3 grams of fiber. Try sunflower seeds.  Follow-up  Make a follow-up appointment with a nutritionist as directed by our staff.  Date Last Reviewed: 6/1/2015  © 6108-2415 [employer] [address] [place] [address], [place] [address]. All rights reserved. This information is not intended as a substitute for professional medical care. Always follow your healthcare professional's instructions.

## 2019-08-14 NOTE — HPI
Lizz Noguera is a 57 y.o. female patient with a PMHx of HIV, CVA with right sided residual weakness, HLP, depression. She initially presented to the ER for suprapubic abd pain and was found to have acute uncomplicated diverticulitis. She was admitted and treated with IV antibiotics. She initially improved, however yesterday she had a fever of 101 and increasing abdominal tenderness and distention. A CT was repeated and is concerning for perforated diverticulitis with abscess. General Surgery was consulted.   Subjective:      Kip Artis is a 64 y.o. male who presents with Diabetes      HPI:    Patient presents today for follow-up of his chronic medical problems and for a DM-RN visit.    Diabetes mellitus with stage 3 chronic kidney disease  We added the Jardiance on his last visit because the diabetes was still not controlled although it improved with hemoglobin A1c down from 11.7-8.8.  He denies any side effects from the Jardiance.  He continues to take all the other medications which are Trulicity 1.5 mg, Metformin 1000 mg BID, and Actos 45 mg for diabetes mellitus type 2 without any problems or side effects.  His sugars have been running between 130-160 fasting in the morning. He occasionally checks his levels after getting home before dinner and they are below 120. Blood work was completed prior to this visit. He has also lost 6 pounds since his last visit.      Stage 3 chronic kidney disease  We have been following the patient for this chronic issue. He has been stable and avoiding nephrotoxic agents.     Essential Hypertension  He takes Losartan-HCTZ 100-25 mg for his hypertension without any side effects. Blood pressure in the office today is elevated at 150/70. Upon recheck, it is acceptable at 130/80.     Dyslipidemia  He has been taking the maximum Simvastatin dosage of 80 mg as prescribed for his dyslipidemia without myalgias or other side effects. Blood work was done before this visit.      Past medical history, past surgical history, family history reviewed-no changes    Social history reviewed-no changes    Allergies reviewed-no changes    Medications reviewed-no changes        ROS:  As per the HPI as shown above, the rest are negative.       Objective:     /70 (BP Location: Left arm, Patient Position: Sitting, BP Cuff Size: Adult)   Pulse 83   Temp 36.6 °C (97.8 °F) (Temporal)   Ht 1.829 m (6')   Wt 121.5 kg (267 lb 13.7 oz)   SpO2 93%   BMI 36.33 kg/m²     Physical Exam    Examined alert,  awake, oriented, not in distress    Neck-supple, no lymphadenopathy, no thyromegaly  Lungs-clear to auscultation, no rales, no wheezes  Heart-regular rate and rhythm, no murmur  Extremities-no edema, clubbing, cyanosis       Labs:  Hospital Outpatient Visit on 08/12/2019   Component Date Value Ref Range Status   • Glycohemoglobin 08/12/2019 7.3* 0.0 - 5.6 % Final    Comment: Increased risk for diabetes:  5.7 -6.4%  Diabetes:  >6.4%  Glycemic control for adults with diabetes:  <7.0%  The above interpretations are per ADA guidelines.  Diagnosis  of diabetes mellitus on the basis of elevated Hemoglobin A1c  should be confirmed by repeating the Hb A1c test.     • Est Avg Glucose 08/12/2019 163  mg/dL Final    Comment: The eAG calculation is based on the A1c-Derived Daily Glucose  (ADAG) study.  See the ADA's website for additional information.     • Sodium 08/12/2019 138  135 - 145 mmol/L Final   • Potassium 08/12/2019 4.8  3.6 - 5.5 mmol/L Final   • Chloride 08/12/2019 103  96 - 112 mmol/L Final   • Co2 08/12/2019 29  20 - 33 mmol/L Final   • Anion Gap 08/12/2019 6.0  0.0 - 11.9 Final   • Glucose 08/12/2019 152* 65 - 99 mg/dL Final   • Bun 08/12/2019 17  8 - 22 mg/dL Final   • Creatinine 08/12/2019 1.39  0.50 - 1.40 mg/dL Final   • Calcium 08/12/2019 9.6  8.5 - 10.5 mg/dL Final   • AST(SGOT) 08/12/2019 15  12 - 45 U/L Final   • ALT(SGPT) 08/12/2019 13  2 - 50 U/L Final   • Alkaline Phosphatase 08/12/2019 56  30 - 99 U/L Final   • Total Bilirubin 08/12/2019 0.5  0.1 - 1.5 mg/dL Final   • Albumin 08/12/2019 4.1  3.2 - 4.9 g/dL Final   • Total Protein 08/12/2019 6.9  6.0 - 8.2 g/dL Final   • Globulin 08/12/2019 2.8  1.9 - 3.5 g/dL Final   • A-G Ratio 08/12/2019 1.5  g/dL Final   • Cholesterol,Tot 08/12/2019 140  100 - 199 mg/dL Final   • Triglycerides 08/12/2019 103  0 - 149 mg/dL Final   • HDL 08/12/2019 45  >=40 mg/dL Final   • LDL 08/12/2019 74  <100 mg/dL Final   • Fasting Status 08/12/2019 Fasting   Final   • GFR If   08/12/2019 >60  >60 mL/min/1.73 m 2 Final   • GFR If Non  08/12/2019 51* >60 mL/min/1.73 m 2 Final        Assessment/Plan:     1. Diabetes mellitus with stage 3 chronic kidney disease (HCC)  Patient was started on Jardiance on his last visit. His A1C has again significantly decreased from 8.8 to 7.3. The goal for him is 8.0 or lower because of his CKD. We will continue him on his current dosages of Jardiance, Trulicity, Actos and Metformin. Labs have been ordered for the next follow up visit.  Patient was also seen by our certified diabetes educator Adelaide Willingham RN.  - VITAMIN D,25 HYDROXY; Future  - Hepatitis B Vaccine Adult IM  - Lipid Profile; Future  - Comp Metabolic Panel; Future  - HEMOGLOBIN A1C; Future  - Empagliflozin (JARDIANCE) 10 MG Tab; Take 1 Tab by mouth every day.  Dispense: 90 Tab; Refill: 1    2. CKD (chronic kidney disease), stage III (HCC)  His kidney function is improved from GFR of 48 to 51. Continue to avoid nephrotoxic agents and follow-up.  Advised proper hydration.  Labs have been ordered for the next follow up visit.   - VITAMIN D,25 HYDROXY; Future  - Hepatitis B Vaccine Adult IM  - Lipid Profile; Future  - Comp Metabolic Panel; Future  - HEMOGLOBIN A1C; Future    3. Essential hypertension  His blood pressure was initially elevated at 150/70, but it improved to within goal of 130/80 upon recheck. We will continue his current Losartan/HCTZ dosage at this time.  He will contact me if blood pressure runs high 140/90 or higher consistently.  - VITAMIN D,25 HYDROXY; Future  - Hepatitis B Vaccine Adult IM  - Lipid Profile; Future  - Comp Metabolic Panel; Future  - HEMOGLOBIN A1C; Future    4. Dyslipidemia  Lipid panel values were all within normal limits.Stable on current Simvastatin dosage. I have advised the patient to avoid fatty foods. Labs have been ordered for the next follow up visit.   - VITAMIN D,25 HYDROXY; Future  - Hepatitis B Vaccine Adult  IM  - Lipid Profile; Future  - Comp Metabolic Panel; Future  - HEMOGLOBIN A1C; Future    5. Need for hepatitis B vaccination  He is at risk for hepatitis B infection because of his diabetes.  We discussed starting vaccination.  First dose was given today.  He will return in 1 month for the second dose.          Uri RAUSCH (Scribe), am scribing for, and in the presence of, Rika Driscoll MD     Electronically signed by: Uri Posey (Scribe), 8/14/2019    IRika MD personally performed the services described in this documentation, as scribed by Uri Posey in my presence, and it is both accurate and complete.

## 2021-04-18 ENCOUNTER — HOSPITAL ENCOUNTER (EMERGENCY)
Facility: HOSPITAL | Age: 61
Discharge: HOME | End: 2021-04-18
Attending: EMERGENCY MEDICINE
Payer: MEDICARE

## 2021-04-18 VITALS
BODY MASS INDEX: 23.95 KG/M2 | HEART RATE: 75 BPM | OXYGEN SATURATION: 100 % | WEIGHT: 143.75 LBS | SYSTOLIC BLOOD PRESSURE: 100 MMHG | DIASTOLIC BLOOD PRESSURE: 59 MMHG | RESPIRATION RATE: 18 BRPM | HEIGHT: 65 IN | TEMPERATURE: 99 F

## 2021-04-18 DIAGNOSIS — K52.9 COLITIS: Primary | ICD-10-CM

## 2021-04-18 LAB
ALBUMIN SERPL BCP-MCNC: 3.6 G/DL (ref 3.5–5.2)
ALP SERPL-CCNC: 115 U/L (ref 55–135)
ALT SERPL W/O P-5'-P-CCNC: 9 U/L (ref 10–44)
ANION GAP SERPL CALC-SCNC: 12 MMOL/L (ref 8–16)
AST SERPL-CCNC: 19 U/L (ref 10–40)
BASOPHILS # BLD AUTO: 0.02 K/UL (ref 0–0.2)
BASOPHILS NFR BLD: 0.2 % (ref 0–1.9)
BILIRUB SERPL-MCNC: 0.6 MG/DL (ref 0.1–1)
BILIRUB UR QL STRIP: NEGATIVE
BUN SERPL-MCNC: 9 MG/DL (ref 8–23)
CALCIUM SERPL-MCNC: 9.7 MG/DL (ref 8.7–10.5)
CHLORIDE SERPL-SCNC: 104 MMOL/L (ref 95–110)
CLARITY UR: CLEAR
CO2 SERPL-SCNC: 20 MMOL/L (ref 23–29)
COLOR UR: YELLOW
CREAT SERPL-MCNC: 0.9 MG/DL (ref 0.5–1.4)
DIFFERENTIAL METHOD: ABNORMAL
EOSINOPHIL # BLD AUTO: 0.1 K/UL (ref 0–0.5)
EOSINOPHIL NFR BLD: 1 % (ref 0–8)
ERYTHROCYTE [DISTWIDTH] IN BLOOD BY AUTOMATED COUNT: 11.9 % (ref 11.5–14.5)
EST. GFR  (AFRICAN AMERICAN): >60 ML/MIN/1.73 M^2
EST. GFR  (NON AFRICAN AMERICAN): >60 ML/MIN/1.73 M^2
GLUCOSE SERPL-MCNC: 98 MG/DL (ref 70–110)
GLUCOSE UR QL STRIP: NEGATIVE
HCT VFR BLD AUTO: 29.5 % (ref 37–48.5)
HGB BLD-MCNC: 9.4 G/DL (ref 12–16)
HGB UR QL STRIP: ABNORMAL
IMM GRANULOCYTES # BLD AUTO: 0.04 K/UL (ref 0–0.04)
IMM GRANULOCYTES NFR BLD AUTO: 0.4 % (ref 0–0.5)
KETONES UR QL STRIP: NEGATIVE
LEUKOCYTE ESTERASE UR QL STRIP: ABNORMAL
LIPASE SERPL-CCNC: 17 U/L (ref 4–60)
LYMPHOCYTES # BLD AUTO: 3.2 K/UL (ref 1–4.8)
LYMPHOCYTES NFR BLD: 35.5 % (ref 18–48)
MCH RBC QN AUTO: 30.6 PG (ref 27–31)
MCHC RBC AUTO-ENTMCNC: 31.9 G/DL (ref 32–36)
MCV RBC AUTO: 96 FL (ref 82–98)
MICROSCOPIC COMMENT: NORMAL
MONOCYTES # BLD AUTO: 0.9 K/UL (ref 0.3–1)
MONOCYTES NFR BLD: 10.1 % (ref 4–15)
NEUTROPHILS # BLD AUTO: 4.8 K/UL (ref 1.8–7.7)
NEUTROPHILS NFR BLD: 52.8 % (ref 38–73)
NITRITE UR QL STRIP: NEGATIVE
NRBC BLD-RTO: 0 /100 WBC
PH UR STRIP: 6 [PH] (ref 5–8)
PLATELET # BLD AUTO: 223 K/UL (ref 150–450)
PMV BLD AUTO: 10.1 FL (ref 9.2–12.9)
POTASSIUM SERPL-SCNC: 4.1 MMOL/L (ref 3.5–5.1)
PROT SERPL-MCNC: 8.1 G/DL (ref 6–8.4)
PROT UR QL STRIP: NEGATIVE
RBC # BLD AUTO: 3.07 M/UL (ref 4–5.4)
RBC #/AREA URNS HPF: 0 /HPF (ref 0–4)
SODIUM SERPL-SCNC: 136 MMOL/L (ref 136–145)
SP GR UR STRIP: 1.01 (ref 1–1.03)
URN SPEC COLLECT METH UR: ABNORMAL
UROBILINOGEN UR STRIP-ACNC: NEGATIVE EU/DL
WBC # BLD AUTO: 9.01 K/UL (ref 3.9–12.7)
WBC #/AREA URNS HPF: 3 /HPF (ref 0–5)

## 2021-04-18 PROCEDURE — 63600175 PHARM REV CODE 636 W HCPCS: Performed by: EMERGENCY MEDICINE

## 2021-04-18 PROCEDURE — 81000 URINALYSIS NONAUTO W/SCOPE: CPT | Performed by: EMERGENCY MEDICINE

## 2021-04-18 PROCEDURE — 96374 THER/PROPH/DIAG INJ IV PUSH: CPT

## 2021-04-18 PROCEDURE — 85025 COMPLETE CBC W/AUTO DIFF WBC: CPT | Performed by: EMERGENCY MEDICINE

## 2021-04-18 PROCEDURE — 83690 ASSAY OF LIPASE: CPT | Performed by: EMERGENCY MEDICINE

## 2021-04-18 PROCEDURE — 87040 BLOOD CULTURE FOR BACTERIA: CPT | Mod: 59 | Performed by: EMERGENCY MEDICINE

## 2021-04-18 PROCEDURE — 99284 EMERGENCY DEPT VISIT MOD MDM: CPT | Mod: 25

## 2021-04-18 PROCEDURE — 25000003 PHARM REV CODE 250: Performed by: EMERGENCY MEDICINE

## 2021-04-18 PROCEDURE — 80053 COMPREHEN METABOLIC PANEL: CPT | Performed by: EMERGENCY MEDICINE

## 2021-04-18 PROCEDURE — 96361 HYDRATE IV INFUSION ADD-ON: CPT

## 2021-04-18 PROCEDURE — 87186 SC STD MICRODIL/AGAR DIL: CPT | Mod: 59 | Performed by: EMERGENCY MEDICINE

## 2021-04-18 PROCEDURE — 87077 CULTURE AEROBIC IDENTIFY: CPT | Performed by: EMERGENCY MEDICINE

## 2021-04-18 RX ORDER — KETOROLAC TROMETHAMINE 30 MG/ML
15 INJECTION, SOLUTION INTRAMUSCULAR; INTRAVENOUS
Status: COMPLETED | OUTPATIENT
Start: 2021-04-18 | End: 2021-04-18

## 2021-04-18 RX ORDER — METRONIDAZOLE 500 MG/1
500 TABLET ORAL
Status: COMPLETED | OUTPATIENT
Start: 2021-04-18 | End: 2021-04-18

## 2021-04-18 RX ORDER — ONDANSETRON 4 MG/1
4 TABLET, FILM COATED ORAL EVERY 6 HOURS PRN
Qty: 12 TABLET | Refills: 0 | Status: SHIPPED | OUTPATIENT
Start: 2021-04-18

## 2021-04-18 RX ORDER — METRONIDAZOLE 500 MG/1
500 TABLET ORAL 4 TIMES DAILY
Qty: 28 TABLET | Refills: 0 | Status: SHIPPED | OUTPATIENT
Start: 2021-04-18 | End: 2021-04-25

## 2021-04-18 RX ORDER — CIPROFLOXACIN 500 MG/1
500 TABLET ORAL
Status: COMPLETED | OUTPATIENT
Start: 2021-04-18 | End: 2021-04-18

## 2021-04-18 RX ORDER — HYDROCODONE BITARTRATE AND ACETAMINOPHEN 10; 325 MG/1; MG/1
1 TABLET ORAL EVERY 6 HOURS PRN
Qty: 9 TABLET | Refills: 0 | Status: ON HOLD | OUTPATIENT
Start: 2021-04-18 | End: 2021-07-09 | Stop reason: HOSPADM

## 2021-04-18 RX ORDER — CIPROFLOXACIN 500 MG/1
500 TABLET ORAL 2 TIMES DAILY
Qty: 14 TABLET | Refills: 0 | Status: SHIPPED | OUTPATIENT
Start: 2021-04-18 | End: 2021-04-25

## 2021-04-18 RX ADMIN — METRONIDAZOLE 500 MG: 500 TABLET ORAL at 06:04

## 2021-04-18 RX ADMIN — CIPROFLOXACIN 500 MG: 500 TABLET, FILM COATED ORAL at 06:04

## 2021-04-18 RX ADMIN — KETOROLAC TROMETHAMINE 15 MG: 30 INJECTION, SOLUTION INTRAMUSCULAR at 02:04

## 2021-04-18 RX ADMIN — SODIUM CHLORIDE 500 ML: 0.9 INJECTION, SOLUTION INTRAVENOUS at 02:04

## 2021-04-20 ENCOUNTER — TELEPHONE (OUTPATIENT)
Dept: EMERGENCY MEDICINE | Facility: HOSPITAL | Age: 61
End: 2021-04-20

## 2021-04-23 ENCOUNTER — TELEPHONE (OUTPATIENT)
Dept: EMERGENCY MEDICINE | Facility: HOSPITAL | Age: 61
End: 2021-04-23

## 2021-04-23 ENCOUNTER — DOCUMENTATION ONLY (OUTPATIENT)
Dept: EMERGENCY MEDICINE | Facility: HOSPITAL | Age: 61
End: 2021-04-23

## 2021-04-23 LAB — BACTERIA BLD CULT: NORMAL

## 2021-04-25 LAB
BACTERIA BLD CULT: ABNORMAL

## 2021-05-27 ENCOUNTER — OFFICE VISIT (OUTPATIENT)
Dept: GASTROENTEROLOGY | Facility: CLINIC | Age: 61
End: 2021-05-27
Payer: MEDICARE

## 2021-05-27 ENCOUNTER — TELEPHONE (OUTPATIENT)
Dept: GASTROENTEROLOGY | Facility: CLINIC | Age: 61
End: 2021-05-27

## 2021-05-27 VITALS
WEIGHT: 142 LBS | OXYGEN SATURATION: 98 % | HEIGHT: 65 IN | DIASTOLIC BLOOD PRESSURE: 60 MMHG | HEART RATE: 84 BPM | SYSTOLIC BLOOD PRESSURE: 120 MMHG | BODY MASS INDEX: 23.66 KG/M2

## 2021-05-27 DIAGNOSIS — R10.32 LLQ ABDOMINAL PAIN: Primary | ICD-10-CM

## 2021-05-27 DIAGNOSIS — K52.9 COLITIS: ICD-10-CM

## 2021-05-27 DIAGNOSIS — R93.3 ABNORMAL CT SCAN, COLON: ICD-10-CM

## 2021-05-27 PROCEDURE — 99213 OFFICE O/P EST LOW 20 MIN: CPT | Mod: PBBFAC | Performed by: NURSE PRACTITIONER

## 2021-05-27 PROCEDURE — 99214 OFFICE O/P EST MOD 30 MIN: CPT | Mod: S$PBB,,, | Performed by: NURSE PRACTITIONER

## 2021-05-27 PROCEDURE — 99999 PR PBB SHADOW E&M-EST. PATIENT-LVL III: ICD-10-PCS | Mod: PBBFAC,,, | Performed by: NURSE PRACTITIONER

## 2021-05-27 PROCEDURE — 99999 PR PBB SHADOW E&M-EST. PATIENT-LVL III: CPT | Mod: PBBFAC,,, | Performed by: NURSE PRACTITIONER

## 2021-05-27 PROCEDURE — 99214 PR OFFICE/OUTPT VISIT, EST, LEVL IV, 30-39 MIN: ICD-10-PCS | Mod: S$PBB,,, | Performed by: NURSE PRACTITIONER

## 2021-05-27 RX ORDER — DARUNAVIR, COBICISTAT, EMTRICITABINE, AND TENOFOVIR ALAFENAMIDE 800; 150; 200; 10 MG/1; MG/1; MG/1; MG/1
1 TABLET, FILM COATED ORAL
COMMUNITY
Start: 2021-05-19 | End: 2021-05-27 | Stop reason: SDUPTHER

## 2021-05-27 RX ORDER — LATANOPROST 50 UG/ML
1 SOLUTION/ DROPS OPHTHALMIC
COMMUNITY

## 2021-05-27 RX ORDER — SODIUM, POTASSIUM,MAG SULFATES 17.5-3.13G
SOLUTION, RECONSTITUTED, ORAL ORAL
Qty: 354 ML | Refills: 0 | Status: SHIPPED | OUTPATIENT
Start: 2021-05-27

## 2021-05-27 RX ORDER — NAPROXEN SODIUM 220 MG
440 TABLET ORAL DAILY PRN
COMMUNITY

## 2021-07-06 ENCOUNTER — LAB VISIT (OUTPATIENT)
Dept: OTOLARYNGOLOGY | Facility: CLINIC | Age: 61
End: 2021-07-06
Payer: MEDICARE

## 2021-07-06 DIAGNOSIS — K52.9 COLITIS: ICD-10-CM

## 2021-07-06 DIAGNOSIS — R93.3 ABNORMAL CT SCAN, COLON: ICD-10-CM

## 2021-07-06 DIAGNOSIS — R10.32 LLQ ABDOMINAL PAIN: ICD-10-CM

## 2021-07-06 PROCEDURE — U0003 INFECTIOUS AGENT DETECTION BY NUCLEIC ACID (DNA OR RNA); SEVERE ACUTE RESPIRATORY SYNDROME CORONAVIRUS 2 (SARS-COV-2) (CORONAVIRUS DISEASE [COVID-19]), AMPLIFIED PROBE TECHNIQUE, MAKING USE OF HIGH THROUGHPUT TECHNOLOGIES AS DESCRIBED BY CMS-2020-01-R: HCPCS | Performed by: NURSE PRACTITIONER

## 2021-07-06 PROCEDURE — U0005 INFEC AGEN DETEC AMPLI PROBE: HCPCS | Performed by: NURSE PRACTITIONER

## 2021-07-07 LAB — SARS-COV-2 RNA RESP QL NAA+PROBE: NOT DETECTED

## 2021-07-09 ENCOUNTER — ANESTHESIA EVENT (OUTPATIENT)
Dept: ENDOSCOPY | Facility: HOSPITAL | Age: 61
End: 2021-07-09
Payer: MEDICARE

## 2021-07-09 ENCOUNTER — HOSPITAL ENCOUNTER (OUTPATIENT)
Facility: HOSPITAL | Age: 61
Discharge: HOME | End: 2021-07-09
Attending: INTERNAL MEDICINE | Admitting: INTERNAL MEDICINE
Payer: MEDICARE

## 2021-07-09 ENCOUNTER — ANESTHESIA (OUTPATIENT)
Dept: ENDOSCOPY | Facility: HOSPITAL | Age: 61
End: 2021-07-09
Payer: MEDICARE

## 2021-07-09 VITALS
TEMPERATURE: 98 F | WEIGHT: 134.5 LBS | SYSTOLIC BLOOD PRESSURE: 107 MMHG | DIASTOLIC BLOOD PRESSURE: 60 MMHG | HEART RATE: 82 BPM | BODY MASS INDEX: 22.41 KG/M2 | OXYGEN SATURATION: 100 % | RESPIRATION RATE: 20 BRPM | HEIGHT: 65 IN

## 2021-07-09 DIAGNOSIS — R10.32 LLQ ABDOMINAL PAIN: ICD-10-CM

## 2021-07-09 DIAGNOSIS — R93.3 ABNORMAL CT SCAN, COLON: Primary | ICD-10-CM

## 2021-07-09 DIAGNOSIS — K57.30 DIVERTICULOSIS OF COLON: ICD-10-CM

## 2021-07-09 PROCEDURE — 25000003 PHARM REV CODE 250: Performed by: NURSE ANESTHETIST, CERTIFIED REGISTERED

## 2021-07-09 PROCEDURE — 45378 PR COLONOSCOPY,DIAGNOSTIC: ICD-10-PCS | Mod: ,,, | Performed by: INTERNAL MEDICINE

## 2021-07-09 PROCEDURE — 37000009 HC ANESTHESIA EA ADD 15 MINS: Performed by: INTERNAL MEDICINE

## 2021-07-09 PROCEDURE — 45378 DIAGNOSTIC COLONOSCOPY: CPT | Performed by: INTERNAL MEDICINE

## 2021-07-09 PROCEDURE — 63600175 PHARM REV CODE 636 W HCPCS: Performed by: NURSE ANESTHETIST, CERTIFIED REGISTERED

## 2021-07-09 PROCEDURE — 45378 DIAGNOSTIC COLONOSCOPY: CPT | Mod: ,,, | Performed by: INTERNAL MEDICINE

## 2021-07-09 PROCEDURE — 37000008 HC ANESTHESIA 1ST 15 MINUTES: Performed by: INTERNAL MEDICINE

## 2021-07-09 RX ORDER — LIDOCAINE HYDROCHLORIDE 10 MG/ML
INJECTION, SOLUTION EPIDURAL; INFILTRATION; INTRACAUDAL; PERINEURAL
Status: DISCONTINUED | OUTPATIENT
Start: 2021-07-09 | End: 2021-07-09

## 2021-07-09 RX ORDER — SODIUM CHLORIDE, SODIUM LACTATE, POTASSIUM CHLORIDE, CALCIUM CHLORIDE 600; 310; 30; 20 MG/100ML; MG/100ML; MG/100ML; MG/100ML
INJECTION, SOLUTION INTRAVENOUS CONTINUOUS
Status: DISCONTINUED | OUTPATIENT
Start: 2021-07-09 | End: 2021-07-09 | Stop reason: HOSPADM

## 2021-07-09 RX ORDER — PROPOFOL 10 MG/ML
VIAL (ML) INTRAVENOUS
Status: DISCONTINUED | OUTPATIENT
Start: 2021-07-09 | End: 2021-07-09

## 2021-07-09 RX ADMIN — PROPOFOL 30 MG: 10 INJECTION, EMULSION INTRAVENOUS at 12:07

## 2021-07-09 RX ADMIN — LIDOCAINE HYDROCHLORIDE 50 MG: 10 INJECTION, SOLUTION EPIDURAL; INFILTRATION; INTRACAUDAL; PERINEURAL at 12:07

## 2021-07-09 RX ADMIN — SODIUM CHLORIDE, SODIUM LACTATE, POTASSIUM CHLORIDE, AND CALCIUM CHLORIDE: .6; .31; .03; .02 INJECTION, SOLUTION INTRAVENOUS at 12:07

## 2021-07-09 RX ADMIN — PROPOFOL 70 MG: 10 INJECTION, EMULSION INTRAVENOUS at 12:07

## 2021-07-14 ENCOUNTER — TELEPHONE (OUTPATIENT)
Dept: SURGERY | Facility: CLINIC | Age: 61
End: 2021-07-14

## 2021-07-28 ENCOUNTER — TELEPHONE (OUTPATIENT)
Dept: SURGERY | Facility: CLINIC | Age: 61
End: 2021-07-28

## 2021-08-31 ENCOUNTER — TELEPHONE (OUTPATIENT)
Dept: SURGERY | Facility: CLINIC | Age: 61
End: 2021-08-31

## 2021-09-03 ENCOUNTER — OFFICE VISIT (OUTPATIENT)
Dept: SURGERY | Facility: CLINIC | Age: 61
End: 2021-09-03
Payer: MEDICARE

## 2021-09-03 VITALS
WEIGHT: 135.13 LBS | DIASTOLIC BLOOD PRESSURE: 74 MMHG | TEMPERATURE: 99 F | SYSTOLIC BLOOD PRESSURE: 123 MMHG | HEART RATE: 93 BPM | BODY MASS INDEX: 22.49 KG/M2

## 2021-09-03 DIAGNOSIS — K56.699 SIGMOID STRICTURE: Primary | ICD-10-CM

## 2021-09-03 DIAGNOSIS — K57.30 DIVERTICULOSIS OF COLON: ICD-10-CM

## 2021-09-03 PROCEDURE — 99204 OFFICE O/P NEW MOD 45 MIN: CPT | Mod: S$PBB,,, | Performed by: COLON & RECTAL SURGERY

## 2021-09-03 PROCEDURE — 99999 PR PBB SHADOW E&M-EST. PATIENT-LVL III: ICD-10-PCS | Mod: PBBFAC,,, | Performed by: COLON & RECTAL SURGERY

## 2021-09-03 PROCEDURE — 99213 OFFICE O/P EST LOW 20 MIN: CPT | Mod: PBBFAC | Performed by: COLON & RECTAL SURGERY

## 2021-09-03 PROCEDURE — 99999 PR PBB SHADOW E&M-EST. PATIENT-LVL III: CPT | Mod: PBBFAC,,, | Performed by: COLON & RECTAL SURGERY

## 2021-09-03 PROCEDURE — 99204 PR OFFICE/OUTPT VISIT, NEW, LEVL IV, 45-59 MIN: ICD-10-PCS | Mod: S$PBB,,, | Performed by: COLON & RECTAL SURGERY

## 2022-04-14 ENCOUNTER — HOSPITAL ENCOUNTER (EMERGENCY)
Facility: HOSPITAL | Age: 62
Discharge: HOME | End: 2022-04-14
Attending: EMERGENCY MEDICINE
Payer: MEDICARE

## 2022-04-14 VITALS
DIASTOLIC BLOOD PRESSURE: 59 MMHG | RESPIRATION RATE: 19 BRPM | WEIGHT: 135.25 LBS | OXYGEN SATURATION: 98 % | HEART RATE: 69 BPM | TEMPERATURE: 99 F | BODY MASS INDEX: 22.51 KG/M2 | SYSTOLIC BLOOD PRESSURE: 107 MMHG

## 2022-04-14 DIAGNOSIS — K57.92 ACUTE DIVERTICULITIS: Primary | ICD-10-CM

## 2022-04-14 LAB
ALBUMIN SERPL BCP-MCNC: 4 G/DL (ref 3.5–5.2)
ALP SERPL-CCNC: 116 U/L (ref 55–135)
ALT SERPL W/O P-5'-P-CCNC: 14 U/L (ref 10–44)
ANION GAP SERPL CALC-SCNC: 10 MMOL/L (ref 8–16)
AST SERPL-CCNC: 22 U/L (ref 10–40)
BASOPHILS # BLD AUTO: 0.02 K/UL (ref 0–0.2)
BASOPHILS NFR BLD: 0.2 % (ref 0–1.9)
BILIRUB SERPL-MCNC: 0.6 MG/DL (ref 0.1–1)
BILIRUB UR QL STRIP: NEGATIVE
BUN SERPL-MCNC: 9 MG/DL (ref 8–23)
CALCIUM SERPL-MCNC: 10.2 MG/DL (ref 8.7–10.5)
CHLORIDE SERPL-SCNC: 104 MMOL/L (ref 95–110)
CLARITY UR: CLEAR
CO2 SERPL-SCNC: 22 MMOL/L (ref 23–29)
COLOR UR: YELLOW
CREAT SERPL-MCNC: 1 MG/DL (ref 0.5–1.4)
DIFFERENTIAL METHOD: ABNORMAL
EOSINOPHIL # BLD AUTO: 0 K/UL (ref 0–0.5)
EOSINOPHIL NFR BLD: 0.5 % (ref 0–8)
ERYTHROCYTE [DISTWIDTH] IN BLOOD BY AUTOMATED COUNT: 12.4 % (ref 11.5–14.5)
EST. GFR  (AFRICAN AMERICAN): >60 ML/MIN/1.73 M^2
EST. GFR  (NON AFRICAN AMERICAN): >60 ML/MIN/1.73 M^2
GLUCOSE SERPL-MCNC: 116 MG/DL (ref 70–110)
GLUCOSE UR QL STRIP: NEGATIVE
HCT VFR BLD AUTO: 31.2 % (ref 37–48.5)
HGB BLD-MCNC: 10 G/DL (ref 12–16)
HGB UR QL STRIP: NEGATIVE
IMM GRANULOCYTES # BLD AUTO: 0.03 K/UL (ref 0–0.04)
IMM GRANULOCYTES NFR BLD AUTO: 0.3 % (ref 0–0.5)
KETONES UR QL STRIP: NEGATIVE
LACTATE SERPL-SCNC: 1.1 MMOL/L (ref 0.5–2.2)
LEUKOCYTE ESTERASE UR QL STRIP: NEGATIVE
LYMPHOCYTES # BLD AUTO: 3.2 K/UL (ref 1–4.8)
LYMPHOCYTES NFR BLD: 36.7 % (ref 18–48)
MCH RBC QN AUTO: 30.6 PG (ref 27–31)
MCHC RBC AUTO-ENTMCNC: 32.1 G/DL (ref 32–36)
MCV RBC AUTO: 95 FL (ref 82–98)
MONOCYTES # BLD AUTO: 0.7 K/UL (ref 0.3–1)
MONOCYTES NFR BLD: 8.3 % (ref 4–15)
NEUTROPHILS # BLD AUTO: 4.8 K/UL (ref 1.8–7.7)
NEUTROPHILS NFR BLD: 54 % (ref 38–73)
NITRITE UR QL STRIP: NEGATIVE
NRBC BLD-RTO: 0 /100 WBC
PH UR STRIP: 6 [PH] (ref 5–8)
PLATELET # BLD AUTO: 226 K/UL (ref 150–450)
PLATELET BLD QL SMEAR: ABNORMAL
PMV BLD AUTO: 9.3 FL (ref 9.2–12.9)
POTASSIUM SERPL-SCNC: 4.5 MMOL/L (ref 3.5–5.1)
PROT SERPL-MCNC: 8 G/DL (ref 6–8.4)
PROT UR QL STRIP: NEGATIVE
RBC # BLD AUTO: 3.27 M/UL (ref 4–5.4)
SODIUM SERPL-SCNC: 136 MMOL/L (ref 136–145)
SP GR UR STRIP: 1.02 (ref 1–1.03)
URN SPEC COLLECT METH UR: ABNORMAL
UROBILINOGEN UR STRIP-ACNC: ABNORMAL EU/DL
WBC # BLD AUTO: 8.8 K/UL (ref 3.9–12.7)

## 2022-04-14 PROCEDURE — 87040 BLOOD CULTURE FOR BACTERIA: CPT | Mod: 59 | Performed by: EMERGENCY MEDICINE

## 2022-04-14 PROCEDURE — 25000003 PHARM REV CODE 250: Performed by: EMERGENCY MEDICINE

## 2022-04-14 PROCEDURE — 25000003 PHARM REV CODE 250: Performed by: NURSE PRACTITIONER

## 2022-04-14 PROCEDURE — 63600175 PHARM REV CODE 636 W HCPCS: Performed by: EMERGENCY MEDICINE

## 2022-04-14 PROCEDURE — S0030 INJECTION, METRONIDAZOLE: HCPCS | Performed by: EMERGENCY MEDICINE

## 2022-04-14 PROCEDURE — 85025 COMPLETE CBC W/AUTO DIFF WBC: CPT | Performed by: NURSE PRACTITIONER

## 2022-04-14 PROCEDURE — 81003 URINALYSIS AUTO W/O SCOPE: CPT | Performed by: NURSE PRACTITIONER

## 2022-04-14 PROCEDURE — 96361 HYDRATE IV INFUSION ADD-ON: CPT

## 2022-04-14 PROCEDURE — 96375 TX/PRO/DX INJ NEW DRUG ADDON: CPT

## 2022-04-14 PROCEDURE — 99291 CRITICAL CARE FIRST HOUR: CPT | Mod: 25

## 2022-04-14 PROCEDURE — 96367 TX/PROPH/DG ADDL SEQ IV INF: CPT

## 2022-04-14 PROCEDURE — 83605 ASSAY OF LACTIC ACID: CPT | Performed by: NURSE PRACTITIONER

## 2022-04-14 PROCEDURE — 80053 COMPREHEN METABOLIC PANEL: CPT | Performed by: NURSE PRACTITIONER

## 2022-04-14 PROCEDURE — 96365 THER/PROPH/DIAG IV INF INIT: CPT

## 2022-04-14 RX ORDER — METRONIDAZOLE 500 MG/1
500 TABLET ORAL EVERY 8 HOURS
Qty: 21 TABLET | Refills: 0 | Status: SHIPPED | OUTPATIENT
Start: 2022-04-14 | End: 2022-04-21

## 2022-04-14 RX ORDER — ONDANSETRON 2 MG/ML
4 INJECTION INTRAMUSCULAR; INTRAVENOUS
Status: COMPLETED | OUTPATIENT
Start: 2022-04-14 | End: 2022-04-14

## 2022-04-14 RX ORDER — ACETAMINOPHEN 500 MG
1000 TABLET ORAL
Status: COMPLETED | OUTPATIENT
Start: 2022-04-14 | End: 2022-04-14

## 2022-04-14 RX ORDER — METRONIDAZOLE 500 MG/100ML
500 INJECTION, SOLUTION INTRAVENOUS
Status: COMPLETED | OUTPATIENT
Start: 2022-04-14 | End: 2022-04-14

## 2022-04-14 RX ORDER — MORPHINE SULFATE 4 MG/ML
4 INJECTION, SOLUTION INTRAMUSCULAR; INTRAVENOUS
Status: COMPLETED | OUTPATIENT
Start: 2022-04-14 | End: 2022-04-14

## 2022-04-14 RX ORDER — DARUNAVIR, COBICISTAT, EMTRICITABINE, AND TENOFOVIR ALAFENAMIDE 800; 150; 200; 10 MG/1; MG/1; MG/1; MG/1
1 TABLET, FILM COATED ORAL EVERY MORNING
COMMUNITY
Start: 2022-04-11

## 2022-04-14 RX ORDER — CIPROFLOXACIN 500 MG/1
500 TABLET ORAL 2 TIMES DAILY
Qty: 14 TABLET | Refills: 0 | Status: SHIPPED | OUTPATIENT
Start: 2022-04-14 | End: 2022-04-21

## 2022-04-14 RX ORDER — CIPROFLOXACIN 2 MG/ML
400 INJECTION, SOLUTION INTRAVENOUS
Status: COMPLETED | OUTPATIENT
Start: 2022-04-14 | End: 2022-04-14

## 2022-04-14 RX ORDER — HYDROCODONE BITARTRATE AND ACETAMINOPHEN 5; 325 MG/1; MG/1
1 TABLET ORAL EVERY 6 HOURS PRN
Qty: 12 TABLET | Refills: 0 | Status: SHIPPED | OUTPATIENT
Start: 2022-04-14 | End: 2022-04-21

## 2022-04-14 RX ADMIN — CIPROFLOXACIN 400 MG: 2 INJECTION, SOLUTION INTRAVENOUS at 09:04

## 2022-04-14 RX ADMIN — MORPHINE SULFATE 4 MG: 4 INJECTION INTRAVENOUS at 09:04

## 2022-04-14 RX ADMIN — ACETAMINOPHEN 1000 MG: 500 TABLET ORAL at 09:04

## 2022-04-14 RX ADMIN — METRONIDAZOLE 500 MG: 500 INJECTION, SOLUTION INTRAVENOUS at 09:04

## 2022-04-14 RX ADMIN — SODIUM CHLORIDE 1000 ML: 0.9 INJECTION, SOLUTION INTRAVENOUS at 09:04

## 2022-04-14 RX ADMIN — ONDANSETRON 4 MG: 2 INJECTION INTRAMUSCULAR; INTRAVENOUS at 09:04

## 2022-04-14 NOTE — FIRST PROVIDER EVALUATION
Medical screening exam completed.  I have conducted a focused provider triage encounter, findings are as follows:    Brief history of present illness:  Patient complains of left sided flank pain    Vitals:    04/14/22 1752   BP: (!) 113/59   BP Location: Right arm   Patient Position: Sitting   Pulse: 102   Resp: 19   Temp: (!) 101.1 °F (38.4 °C)   TempSrc: Oral   SpO2: 99%   Weight: 61.4 kg (135 lb 4 oz)       Pertinent physical exam:  Patient appears very uncomfortable    Brief workup plan:  Renal stone versus pyelonephritis    Preliminary workup initiated; this workup will be continued and followed by the physician or advanced practice provider that is assigned to the patient when roomed.

## 2022-04-15 NOTE — ED NOTES
Bed: 18  Expected date:   Expected time:   Means of arrival: Personal Transportation  Comments:   Sling applied to left arm and patient sent home with script for norco and voices understanding to follow up with ortho.       Handy Hi RN  09/09/18 [id number]

## 2022-04-15 NOTE — ED NOTES
Patient identifiers verified and correct for Lizz Noguera.    LOC: The patient is awake, alert and aware of environment with an appropriate affect, the patient is oriented x 3 and speaking appropriately.  APPEARANCE: Patient resting comfortably and in no acute distress, patient is clean and well groomed, patient's clothing is properly fastened.  SKIN: The skin is warm and dry, color consistent with ethnicity, patient has normal skin turgor and moist mucus membranes, skin intact, no breakdown or bruising noted.  MUSCULOSKELETAL: Patient moving all extremities spontaneously.  RESPIRATORY: Airway is open and patent, respirations are spontaneous.  CARDIAC: Patient has a normal rate, no periphreal edema noted, capillary refill < 3 seconds.  ABDOMEN: Soft and tender to palpation in LLQ.

## 2022-04-15 NOTE — ED PROVIDER NOTES
SCRIBE #1 NOTE: I, Iesha Freed, am scribing for, and in the presence of, Tito Borrego MD. I have scribed the entire note.       History     Chief Complaint   Patient presents with    Flank Pain     Left flank pain, onset yesterday, denies dysuria     Review of patient's allergies indicates:   Allergen Reactions    Bactrim [sulfamethoxazole-trimethoprim] Rash         History of Present Illness     HPI    4/14/2022, 8:57 PM  History obtained from the patient      History of Present Illness: Lizz Noguera is a 61 y.o. female patient with a PMHx of anemia, cholelithiasis, CVA, diverticulitis, HIV, HLD, and HTN who presents to the Emergency Department for evaluation of left flank pain which onset gradually 1 day ago. Symptoms are constant and moderate in severity. No mitigating or exacerbating factors reported. Associated sxs include L sided abdominal pain. Patient denies any dysuria, hematuria, urinary frequency, urinary urgency, fever, chills, n/v/d, and all other sxs at this time. Pt does not know her CD4 count. States she is scheduled to get blood work and see a doctor for her HIV this month. No further complaints or concerns at this time.       Arrival mode: Personal vehicle    PCP: Joseluis Uribe MD        Past Medical History:  Past Medical History:   Diagnosis Date    Anemia     Cholelithiasis     CVA (cerebral vascular accident)     Diverticulitis     Glaucoma     HIV disease     HLD (hyperlipidemia)     HTN (hypertension)        Past Surgical History:  Past Surgical History:   Procedure Laterality Date    COLONOSCOPY      COLONOSCOPY N/A 7/9/2021    Procedure: COLONOSCOPY;  Surgeon: Yenifer Franco MD;  Location: [facility];  Service: Endoscopy;  Laterality: N/A;    NONE           Family History:  Family History   Problem Relation Age of Onset    Stroke Mother        Social History:  Social History     Tobacco Use    Smoking status: Current Every Day Smoker     Packs/day: 1.00      Years: 46.00     Pack years: 46.00     Types: Cigarettes    Smokeless tobacco: Never Used   Substance and Sexual Activity    Alcohol use: No    Drug use: No    Sexual activity: Yes     Partners: Male        Review of Systems     Review of Systems   Constitutional: Negative for chills and fever.   HENT: Negative for sore throat.    Respiratory: Negative for shortness of breath.    Cardiovascular: Negative for chest pain.   Gastrointestinal: Positive for abdominal pain (L sided). Negative for diarrhea, nausea and vomiting.   Genitourinary: Positive for flank pain (L). Negative for dysuria, frequency, hematuria and urgency.   Musculoskeletal: Negative for back pain.   Skin: Negative for rash.   Neurological: Negative for weakness.   Hematological: Does not bruise/bleed easily.   All other systems reviewed and are negative.     Physical Exam     Initial Vitals [04/14/22 1752]   BP Pulse Resp Temp SpO2   (!) 113/59 102 19 (!) 101.1 °F (38.4 °C) 99 %      MAP       --          Physical Exam  Nursing Notes and Vital Signs Reviewed.  Constitutional: Patient is in no acute distress. Well-developed and well-nourished.  Head: Atraumatic. Normocephalic.  Eyes: PERRL. EOM intact. Conjunctivae are not pale. No scleral icterus.  ENT: Mucous membranes are moist. Oropharynx is clear and symmetric.    Neck: Supple. Full ROM.  Cardiovascular: Tachycardic. Regular rhythm. No murmurs, rubs, or gallops. Distal pulses are 2+ and symmetric.  Pulmonary/Chest: No respiratory distress. Clear to auscultation bilaterally. No wheezing or rales.  Abdominal: Soft and non-distended.  There is LLQ and LUQ tenderness.  No rebound, guarding, or rigidity. Good bowel sounds.  Genitourinary: No CVA tenderness  Musculoskeletal: Moves all extremities. No obvious deformities. No edema. No calf tenderness.  Skin: Warm and dry.  Neurological:  Alert, awake, and appropriate.  Normal speech.  No acute focal neurological deficits are  appreciated.  Psychiatric: Normal affect. Good eye contact. Appropriate in content.     ED Course   Critical Care    Date/Time: 4/14/2022 9:52 PM  Performed by: Tito Borrego MD  Authorized by: Tito Borrego MD   Direct patient critical care time: 15 minutes  Additional history critical care time: 10 minutes  Ordering / reviewing critical care time: 5 minutes  Documentation critical care time: 5 minutes  Total critical care time (exclusive of procedural time) : 35 minutes  Critical care time was exclusive of separately billable procedures and treating other patients and teaching time.  Critical care was necessary to treat or prevent imminent or life-threatening deterioration of the following conditions: sepsis.  Critical care was time spent personally by me on the following activities: blood draw for specimens, development of treatment plan with patient or surrogate, interpretation of cardiac output measurements, evaluation of patient's response to treatment, examination of patient, obtaining history from patient or surrogate, ordering and performing treatments and interventions, ordering and review of laboratory studies, ordering and review of radiographic studies, pulse oximetry, re-evaluation of patient's condition and review of old charts.        ED Vital Signs:  Vitals:    04/14/22 1752 04/14/22 2100 04/14/22 2102 04/14/22 2130   BP: (!) 113/59 (!) 119/58  (!) 113/59   Pulse: 102   74   Resp: 19   18   Temp: (!) 101.1 °F (38.4 °C)  98.9 °F (37.2 °C)    TempSrc: Oral      SpO2: 99%   100%   Weight: 61.4 kg (135 lb 4 oz)       04/14/22 2134 04/14/22 2200 04/14/22 2230 04/14/22 2300   BP:  127/62 117/60 (!) 107/59   Pulse:  76 72 69   Resp: 19 19 20 19   Temp:       TempSrc:       SpO2:  99% 98% 98%   Weight:           Abnormal Lab Results:  Labs Reviewed   CBC W/ AUTO DIFFERENTIAL - Abnormal; Notable for the following components:       Result Value    RBC 3.27 (*)     Hemoglobin 10.0 (*)      Hematocrit 31.2 (*)     All other components within normal limits   COMPREHENSIVE METABOLIC PANEL - Abnormal; Notable for the following components:    CO2 22 (*)     Glucose 116 (*)     All other components within normal limits   URINALYSIS, REFLEX TO URINE CULTURE - Abnormal; Notable for the following components:    Urobilinogen, UA 2.0-3.0 (*)     All other components within normal limits    Narrative:     Specimen Source->Urine   CULTURE, BLOOD   CULTURE, BLOOD   LACTIC ACID, PLASMA        All Lab Results:  Results for orders placed or performed during the hospital encounter of 04/14/22   Blood culture #1 **CANNOT BE ORDERED STAT**    Specimen: Peripheral, Hand, Left; Blood   Result Value Ref Range    Blood Culture, Routine No Growth to date    Blood culture #2 **CANNOT BE ORDERED STAT**    Specimen: Peripheral, Hand, Left; Blood   Result Value Ref Range    Blood Culture, Routine No Growth to date    CBC auto differential   Result Value Ref Range    WBC 8.80 3.90 - 12.70 K/uL    RBC 3.27 (L) 4.00 - 5.40 M/uL    Hemoglobin 10.0 (L) 12.0 - 16.0 g/dL    Hematocrit 31.2 (L) 37.0 - 48.5 %    MCV 95 82 - 98 fL    MCH 30.6 27.0 - 31.0 pg    MCHC 32.1 32.0 - 36.0 g/dL    RDW 12.4 11.5 - 14.5 %    Platelets 226 150 - 450 K/uL    MPV 9.3 9.2 - 12.9 fL    Immature Granulocytes 0.3 0.0 - 0.5 %    Gran # (ANC) 4.8 1.8 - 7.7 K/uL    Immature Grans (Abs) 0.03 0.00 - 0.04 K/uL    Lymph # 3.2 1.0 - 4.8 K/uL    Mono # 0.7 0.3 - 1.0 K/uL    Eos # 0.0 0.0 - 0.5 K/uL    Baso # 0.02 0.00 - 0.20 K/uL    nRBC 0 0 /100 WBC    Gran % 54.0 38.0 - 73.0 %    Lymph % 36.7 18.0 - 48.0 %    Mono % 8.3 4.0 - 15.0 %    Eosinophil % 0.5 0.0 - 8.0 %    Basophil % 0.2 0.0 - 1.9 %    Platelet Estimate Appears normal     Differential Method Automated    Comprehensive metabolic panel   Result Value Ref Range    Sodium 136 136 - 145 mmol/L    Potassium 4.5 3.5 - 5.1 mmol/L    Chloride 104 95 - 110 mmol/L    CO2 22 (L) 23 - 29 mmol/L    Glucose 116 (H)  70 - 110 mg/dL    BUN 9 8 - 23 mg/dL    Creatinine 1.0 0.5 - 1.4 mg/dL    Calcium 10.2 8.7 - 10.5 mg/dL    Total Protein 8.0 6.0 - 8.4 g/dL    Albumin 4.0 3.5 - 5.2 g/dL    Total Bilirubin 0.6 0.1 - 1.0 mg/dL    Alkaline Phosphatase 116 55 - 135 U/L    AST 22 10 - 40 U/L    ALT 14 10 - 44 U/L    Anion Gap 10 8 - 16 mmol/L    eGFR if African American >60 >60 mL/min/1.73 m^2    eGFR if non African American >60 >60 mL/min/1.73 m^2   Lactic acid, plasma   Result Value Ref Range    Lactate (Lactic Acid) 1.1 0.5 - 2.2 mmol/L   Urinalysis, Reflex to Urine Culture Urine, Clean Catch    Specimen: Urine   Result Value Ref Range    Specimen UA Urine, Clean Catch     Color, UA Yellow Yellow, Straw, Kellen    Appearance, UA Clear Clear    pH, UA 6.0 5.0 - 8.0    Specific Gravity, UA 1.020 1.005 - 1.030    Protein, UA Negative Negative    Glucose, UA Negative Negative    Ketones, UA Negative Negative    Bilirubin (UA) Negative Negative    Occult Blood UA Negative Negative    Nitrite, UA Negative Negative    Urobilinogen, UA 2.0-3.0 (A) <2.0 EU/dL    Leukocytes, UA Negative Negative       Imaging Results:  Imaging Results          CT Abdomen Pelvis  Without Contrast (Final result)  Result time 04/14/22 18:39:58    Final result by Cayden Mcdaniels MD (04/14/22 18:39:58)                 Impression:      Mild sigmoid diverticulosis. Slight adjacent fat stranding adjacent to the sigmoid diverticuli may relate to minimal diverticulitis.    Small fibroid    Cholelithiasis    Atherosclerotic changes    Punctate nonobstructing left renal calculi.  No evidence for hydronephrosis.    Small fat containing umbilical hernia    All CT scans   are performed using dose optimization techniques including the following: automated exposure control; adjustment of the mA and/or kV; use of iterative reconstruction technique.  Dose modulation was employed for ALARA by means of: Automated exposure control; adjustment of the mA and/or kV according to patient  size (this includes techniques or standardized protocols for targeted exams where dose is matched to indication/reason for exam; i.e. extremities or head); and/or use of iterative reconstructive technique.      Electronically signed by: Arslan Cayden  Date:    04/14/2022  Time:    18:39             Narrative:    EXAMINATION:  CT ABDOMEN PELVIS WITHOUT CONTRAST    CLINICAL HISTORY:  Flank pain, kidney stone suspected;left;    TECHNIQUE:  Low dose axial images, sagittal and coronal reformations were obtained from the lung bases to the pubic symphysis, 30 mL of oral Omnipaque 350 was administered..    COMPARISON:  None    FINDINGS:  Heart: Normal in size as far as seen.  No pericardial effusion as far seen.    Lung Bases: Well aerated, without consolidation or pleural fluid.    Liver: Normal in size and attenuation, with no focal hepatic lesions.    Gallbladder: Fall multiple small gallstones    Bile Ducts: No evidence of dilated ducts.    Pancreas: No mass or peripancreatic fat stranding.    Spleen: Unremarkable.    Adrenals: Unremarkable.    Kidneys/ Ureters: No hydronephrosis.  Punctate nonobstructing left renal calculi.  No obstructing renal calculi    Bladder: No evidence of wall thickening.    Reproductive organs: Small fibroid    GI Tract/Mesentery: Mild sigmoid diverticulosis.  Slight adjacent fat stranding adjacent to the sigmoid diverticuli may relate to minimal diverticulitis.  No secondary signs of appendicitis.  Normal appendix    Peritoneal Space: No ascites. No free air.    Retroperitoneum: No significant adenopathy.    Abdominal wall: Small fat containing umbilical hernia.    Vasculature: No aneurysm.  Atherosclerotic changes.    Bones: No acute fracture.                                        The Emergency Provider reviewed the vital signs and test results, which are outlined above.     ED Discussion     10:59 PM: Reassessed pt at this time. Discussed with pt all pertinent ED information and results.  Discussed pt dx and plan of tx. Gave pt all f/u and return to the ED instructions. All questions and concerns were addressed at this time. Pt expresses understanding of information and instructions, and is comfortable with plan to discharge. Pt is stable for discharge.    I discussed with patient and/or family/caretaker that evaluation in the ED does not suggest any emergent or life threatening medical conditions requiring immediate intervention beyond what was provided in the ED, and I believe patient is safe for discharge.  Regardless, an unremarkable evaluation in the ED does not preclude the development or presence of a serious of life threatening condition. As such, patient was instructed to return immediately for any worsening or change in current symptoms.       Medical Decision Making:   Clinical Tests:   Lab Tests: Ordered and Reviewed  Radiological Study: Ordered and Reviewed           ED Medication(s):  Medications   sodium chloride 0.9% bolus 1,000 mL (0 mLs Intravenous Stopped 4/14/22 2203)   acetaminophen tablet 1,000 mg (1,000 mg Oral Given 4/14/22 2102)   morphine injection 4 mg (4 mg Intravenous Given 4/14/22 2134)   ondansetron injection 4 mg (4 mg Intravenous Given 4/14/22 2134)   ciprofloxacin (CIPRO)400mg/200ml D5W IVPB 400 mg (0 mg Intravenous Stopped 4/14/22 2243)   metronidazole IVPB 500 mg (0 mg Intravenous Stopped 4/14/22 2242)       Discharge Medication List as of 4/14/2022 10:58 PM      START taking these medications    Details   ciprofloxacin HCl (CIPRO) 500 MG tablet Take 1 tablet (500 mg total) by mouth 2 (two) times daily. for 7 days, Starting u 4/14/2022, Until u 4/21/2022, Print      HYDROcodone-acetaminophen (NORCO) 5-325 mg per tablet Take 1 tablet by mouth every 6 (six) hours as needed for Pain., Starting Thu 4/14/2022, Until Thu 4/21/2022 at 2359, Print      metroNIDAZOLE (FLAGYL) 500 MG tablet Take 1 tablet (500 mg total) by mouth every 8 (eight) hours. for 7 days, Starting  Thu 4/14/2022, Until u 4/21/2022, Print              Follow-up Information     Joseluis Uribe MD. Schedule an appointment as soon as possible for a visit in 1 week.    Specialty: Cardiology  Contact information:  [address] [facility] [address]  [phone]             [facility] - Emergency Dept..    Specialty: Emergency Medicine  Why: As needed, If symptoms worsen  Contact information:  [address] [facility] [address] [phone]                           Scribe Attestation:   Scribe #1: I performed the above scribed service and the documentation accurately describes the services I performed. I attest to the accuracy of the note.     Attending:   Physician Attestation Statement for Scribe #1: I, Tito Borrego MD, personally performed the services described in this documentation, as scribed by Iesha Freed, in my presence, and it is both accurate and complete.           Clinical Impression       ICD-10-CM ICD-9-CM   1. Acute diverticulitis  K57.92 562.11       Disposition:   Disposition: Discharged  Condition: Stable         Tito Borrego MD  04/15/22 2029

## 2022-04-20 LAB
BACTERIA BLD CULT: NORMAL
BACTERIA BLD CULT: NORMAL

## 2022-10-18 NOTE — PLAN OF CARE
Problem: Physical Therapy Goal  Goal: Physical Therapy Goal  ltg's for PT by 7/15/18  1. Patient will perform sit to stand indep  2. Patient will amb mod indep x 300ft no lob  3. Patient will perform stand b le exs x 20 reps    Outcome: Ongoing (interventions implemented as appropriate)  BED MOB WITH SPV FOR SUP>SIT. TF SIT>STAND WITH SPV. GT 70' X2 WITH NO AD AND CGA/EMMA, SLOW PACED AND QUICK TO FATIGUE.        oral

## 2024-11-26 ENCOUNTER — HOSPITAL ENCOUNTER (EMERGENCY)
Facility: HOSPITAL | Age: 64
Discharge: HOME | End: 2024-11-26
Attending: EMERGENCY MEDICINE
Payer: MEDICARE

## 2024-11-26 VITALS
RESPIRATION RATE: 18 BRPM | BODY MASS INDEX: 21.89 KG/M2 | HEART RATE: 88 BPM | OXYGEN SATURATION: 98 % | DIASTOLIC BLOOD PRESSURE: 60 MMHG | WEIGHT: 128.19 LBS | TEMPERATURE: 98 F | HEIGHT: 64 IN | SYSTOLIC BLOOD PRESSURE: 123 MMHG

## 2024-11-26 DIAGNOSIS — N30.01 ACUTE CYSTITIS WITH HEMATURIA: Primary | ICD-10-CM

## 2024-11-26 LAB
BACTERIA #/AREA URNS HPF: ABNORMAL /HPF
BILIRUB UR QL STRIP: NEGATIVE
CLARITY UR: ABNORMAL
COLOR UR: YELLOW
GLUCOSE UR QL STRIP: NEGATIVE
HGB UR QL STRIP: ABNORMAL
HYALINE CASTS #/AREA URNS LPF: 10 /LPF
KETONES UR QL STRIP: NEGATIVE
LEUKOCYTE ESTERASE UR QL STRIP: ABNORMAL
MICROSCOPIC COMMENT: ABNORMAL
NITRITE UR QL STRIP: NEGATIVE
PH UR STRIP: 6 [PH] (ref 5–8)
PROT UR QL STRIP: ABNORMAL
RBC #/AREA URNS HPF: 38 /HPF (ref 0–4)
SP GR UR STRIP: 1.01 (ref 1–1.03)
SQUAMOUS #/AREA URNS HPF: 5 /HPF
URN SPEC COLLECT METH UR: ABNORMAL
UROBILINOGEN UR STRIP-ACNC: ABNORMAL EU/DL
WBC #/AREA URNS HPF: >100 /HPF (ref 0–5)
WBC CLUMPS URNS QL MICRO: ABNORMAL

## 2024-11-26 PROCEDURE — 87086 URINE CULTURE/COLONY COUNT: CPT | Performed by: NURSE PRACTITIONER

## 2024-11-26 PROCEDURE — 99283 EMERGENCY DEPT VISIT LOW MDM: CPT

## 2024-11-26 PROCEDURE — 25000003 PHARM REV CODE 250: Performed by: NURSE PRACTITIONER

## 2024-11-26 PROCEDURE — 81000 URINALYSIS NONAUTO W/SCOPE: CPT | Performed by: NURSE PRACTITIONER

## 2024-11-26 RX ORDER — CEPHALEXIN 500 MG/1
500 CAPSULE ORAL 4 TIMES DAILY
Qty: 20 CAPSULE | Refills: 0 | Status: SHIPPED | OUTPATIENT
Start: 2024-11-26 | End: 2024-12-01

## 2024-11-26 RX ORDER — CEPHALEXIN 500 MG/1
500 CAPSULE ORAL
Status: COMPLETED | OUTPATIENT
Start: 2024-11-26 | End: 2024-11-26

## 2024-11-26 RX ADMIN — CEPHALEXIN 500 MG: 500 CAPSULE ORAL at 05:11

## 2024-11-26 NOTE — Clinical Note
"Lizz"Corey Noguera was seen and treated in our emergency department on 11/26/2024.  She may return to work on 11/27/2024.       If you have any questions or concerns, please don't hesitate to call.      Andreas Davis NP"

## 2024-11-27 LAB
BACTERIA UR CULT: NORMAL
BACTERIA UR CULT: NORMAL

## 2024-11-27 NOTE — ED PROVIDER NOTES
Encounter Date: 11/26/2024       History     Chief Complaint   Patient presents with    Urinary Frequency     Pt c/o urinary frequency x 3 days with urine that is more yellow than usual.  Pt also c/o pelvic pain.     Patient complains of polyuria and dark foul urine for several days.  Denies abdominal pain or fever        Review of patient's allergies indicates:   Allergen Reactions    Bactrim [sulfamethoxazole-trimethoprim] Rash     Past Medical History:   Diagnosis Date    Anemia     Cholelithiasis     CVA (cerebral vascular accident)     Diverticulitis     Glaucoma     HIV disease     HLD (hyperlipidemia)     HTN (hypertension)      Past Surgical History:   Procedure Laterality Date    COLONOSCOPY      COLONOSCOPY N/A 7/9/2021    Procedure: COLONOSCOPY;  Surgeon: Yenifer Franco MD;  Location: [facility];  Service: Endoscopy;  Laterality: N/A;    NONE       Family History   Problem Relation Name Age of Onset    Stroke Mother       Social History     Tobacco Use    Smoking status: Every Day     Current packs/day: 1.00     Average packs/day: 1 pack/day for 46.0 years (46.0 ttl pk-yrs)     Types: Cigarettes    Smokeless tobacco: Never   Substance Use Topics    Alcohol use: No    Drug use: No     Review of Systems   Constitutional:  Negative for fever.   HENT:  Negative for sore throat.    Respiratory:  Negative for shortness of breath.    Cardiovascular:  Negative for chest pain.   Gastrointestinal:  Negative for nausea.   Genitourinary:  Negative for dysuria.   Musculoskeletal:  Negative for back pain.   Skin:  Negative for rash.   Neurological:  Negative for weakness.   Hematological:  Does not bruise/bleed easily.       Physical Exam     Initial Vitals [11/26/24 1616]   BP Pulse Resp Temp SpO2   123/60 88 18 98.4 °F (36.9 °C) 98 %      MAP       --         Physical Exam    Nursing note and vitals reviewed.  Constitutional: She appears well-developed and well-nourished. She is not diaphoretic. She is active.   Non-toxic appearance. No distress.   HENT:   Head: Normocephalic and atraumatic.   Eyes: Conjunctivae are normal. Right eye exhibits no discharge. Left eye exhibits no discharge. No scleral icterus.   Neck:   Normal range of motion.  Cardiovascular:  Normal rate, regular rhythm and intact distal pulses.           No murmur heard.  Pulmonary/Chest: Breath sounds normal. No respiratory distress. She has no wheezes.   Abdominal: She exhibits no distension.   Musculoskeletal:         General: No tenderness. Normal range of motion.      Cervical back: Normal range of motion.     Neurological: She is alert and oriented to person, place, and time. No cranial nerve deficit. GCS score is 15. GCS eye subscore is 4. GCS verbal subscore is 5. GCS motor subscore is 6.   Skin: Skin is warm and dry. Capillary refill takes less than 2 seconds. No rash noted.   Psychiatric: She has a normal mood and affect. Her behavior is normal. Judgment and thought content normal.         ED Course   Procedures  Labs Reviewed   URINALYSIS, REFLEX TO URINE CULTURE - Abnormal       Result Value    Specimen UA Urine, Clean Catch      Color, UA Yellow      Appearance, UA Cloudy (*)     pH, UA 6.0      Specific Gravity, UA 1.015      Protein, UA 2+ (*)     Glucose, UA Negative      Ketones, UA Negative      Bilirubin (UA) Negative      Occult Blood UA 3+ (*)     Nitrite, UA Negative      Urobilinogen, UA 2.0-3.0 (*)     Leukocytes, UA 3+ (*)     Narrative:     Specimen Source->Urine   URINALYSIS MICROSCOPIC - Abnormal    RBC, UA 38 (*)     WBC, UA >100 (*)     WBC Clumps, UA Many (*)     Bacteria Few (*)     Squam Epithel, UA 5      Hyaline Casts, UA 10 (*)     Microscopic Comment SEE COMMENT      Narrative:     Specimen Source->Urine   CULTURE, URINE          Imaging Results    None          Medications   cephALEXin capsule 500 mg (500 mg Oral Given 11/26/24 8912)     Medical Decision Making  Differential diagnosis considered but not limited to;  urinary tract infection, bacterial vaginosis    Risk  Prescription drug management.                                      Clinical Impression:  Final diagnoses:  [N30.01] Acute cystitis with hematuria (Primary)          ED Disposition Condition    Discharge Stable          ED Prescriptions       Medication Sig Dispense Start Date End Date Auth. Provider    cephALEXin (KEFLEX) 500 MG capsule Take 1 capsule (500 mg total) by mouth 4 (four) times daily. for 5 days 20 capsule 11/26/2024 12/1/2024 Andreas Davis NP          Follow-up Information       Follow up With Specialties Details Why Contact Info    Joseluis Uribe MD Cardiology Schedule an appointment as soon as possible for a visit in 3 days  [address] [facility] [address]  [phone]               Andreas Davis NP  11/26/24 8836